# Patient Record
Sex: FEMALE | Race: WHITE | NOT HISPANIC OR LATINO | Employment: UNEMPLOYED | URBAN - NONMETROPOLITAN AREA
[De-identification: names, ages, dates, MRNs, and addresses within clinical notes are randomized per-mention and may not be internally consistent; named-entity substitution may affect disease eponyms.]

---

## 2017-03-27 ENCOUNTER — APPOINTMENT (OUTPATIENT)
Dept: GENERAL RADIOLOGY | Facility: HOSPITAL | Age: 47
End: 2017-03-27

## 2017-03-27 ENCOUNTER — HOSPITAL ENCOUNTER (EMERGENCY)
Facility: HOSPITAL | Age: 47
Discharge: HOME | End: 2017-03-28
Attending: FAMILY MEDICINE | Admitting: FAMILY MEDICINE

## 2017-03-27 ENCOUNTER — APPOINTMENT (OUTPATIENT)
Dept: CT IMAGING | Facility: HOSPITAL | Age: 47
End: 2017-03-27
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
## 2017-03-27 DIAGNOSIS — S76.011A STRAIN OF HIP, RIGHT, INITIAL ENCOUNTER: ICD-10-CM

## 2017-03-27 LAB
ALBUMIN SERPL-MCNC: 4.2 G/DL (ref 3.4–4.8)
ALBUMIN/GLOB SERPL: 1.3 G/DL (ref 1.1–1.8)
ALP SERPL-CCNC: 107 U/L (ref 38–126)
ALT SERPL W P-5'-P-CCNC: 57 U/L (ref 9–52)
AMPHET+METHAMPHET UR QL: POSITIVE
ANION GAP SERPL CALCULATED.3IONS-SCNC: 15 MMOL/L (ref 5–15)
AST SERPL-CCNC: 52 U/L (ref 14–36)
B-HCG UR QL: NEGATIVE
BARBITURATES UR QL SCN: NEGATIVE
BASOPHILS # BLD AUTO: 0.07 10*3/MM3 (ref 0–0.2)
BASOPHILS NFR BLD AUTO: 0.6 % (ref 0–2)
BENZODIAZ UR QL SCN: NEGATIVE
BILIRUB SERPL-MCNC: 1.1 MG/DL (ref 0.2–1.3)
BUN BLD-MCNC: 15 MG/DL (ref 7–21)
BUN/CREAT SERPL: 15.8 (ref 7–25)
CALCIUM SPEC-SCNC: 9 MG/DL (ref 8.4–10.2)
CANNABINOIDS SERPL QL: NEGATIVE
CHLORIDE SERPL-SCNC: 104 MMOL/L (ref 95–110)
CK SERPL-CCNC: 212 U/L (ref 30–135)
CO2 SERPL-SCNC: 23 MMOL/L (ref 22–31)
COCAINE UR QL: NEGATIVE
CREAT BLD-MCNC: 0.95 MG/DL (ref 0.5–1)
DEPRECATED RDW RBC AUTO: 43.4 FL (ref 36.4–46.3)
EOSINOPHIL # BLD AUTO: 0.36 10*3/MM3 (ref 0–0.7)
EOSINOPHIL NFR BLD AUTO: 3 % (ref 0–7)
ERYTHROCYTE [DISTWIDTH] IN BLOOD BY AUTOMATED COUNT: 13.3 % (ref 11.5–14.5)
GFR SERPL CREATININE-BSD FRML MDRD: 64 ML/MIN/1.73 (ref 60–135)
GLOBULIN UR ELPH-MCNC: 3.2 GM/DL (ref 2.3–3.5)
GLUCOSE BLD-MCNC: 95 MG/DL (ref 60–100)
HCT VFR BLD AUTO: 40.7 % (ref 35–45)
HGB BLD-MCNC: 13.9 G/DL (ref 12–15.5)
IMM GRANULOCYTES # BLD: 0.02 10*3/MM3 (ref 0–0.02)
IMM GRANULOCYTES NFR BLD: 0.2 % (ref 0–0.5)
LYMPHOCYTES # BLD AUTO: 3.88 10*3/MM3 (ref 0.6–4.2)
LYMPHOCYTES NFR BLD AUTO: 32.9 % (ref 10–50)
MCH RBC QN AUTO: 30.7 PG (ref 26.5–34)
MCHC RBC AUTO-ENTMCNC: 34.2 G/DL (ref 31.4–36)
MCV RBC AUTO: 89.8 FL (ref 80–98)
METHADONE UR QL SCN: NEGATIVE
MONOCYTES # BLD AUTO: 0.8 10*3/MM3 (ref 0–0.9)
MONOCYTES NFR BLD AUTO: 6.8 % (ref 0–12)
NEUTROPHILS # BLD AUTO: 6.68 10*3/MM3 (ref 2–8.6)
NEUTROPHILS NFR BLD AUTO: 56.5 % (ref 37–80)
OPIATES UR QL: NEGATIVE
OXYCODONE UR QL SCN: NEGATIVE
PLATELET # BLD AUTO: 243 10*3/MM3 (ref 150–450)
PMV BLD AUTO: 10.2 FL (ref 8–12)
POTASSIUM BLD-SCNC: 3.4 MMOL/L (ref 3.5–5.1)
PROT SERPL-MCNC: 7.4 G/DL (ref 6.3–8.6)
RBC # BLD AUTO: 4.53 10*6/MM3 (ref 3.77–5.16)
SODIUM BLD-SCNC: 142 MMOL/L (ref 137–145)
WBC NRBC COR # BLD: 11.81 10*3/MM3 (ref 3.2–9.8)

## 2017-03-27 PROCEDURE — 80307 DRUG TEST PRSMV CHEM ANLYZR: CPT | Performed by: FAMILY MEDICINE

## 2017-03-27 PROCEDURE — 96361 HYDRATE IV INFUSION ADD-ON: CPT

## 2017-03-27 PROCEDURE — 85025 COMPLETE CBC W/AUTO DIFF WBC: CPT | Performed by: FAMILY MEDICINE

## 2017-03-27 PROCEDURE — 73502 X-RAY EXAM HIP UNI 2-3 VIEWS: CPT

## 2017-03-27 PROCEDURE — 96374 THER/PROPH/DIAG INJ IV PUSH: CPT

## 2017-03-27 PROCEDURE — 99284 EMERGENCY DEPT VISIT MOD MDM: CPT

## 2017-03-27 PROCEDURE — 81025 URINE PREGNANCY TEST: CPT | Performed by: FAMILY MEDICINE

## 2017-03-27 PROCEDURE — 70450 CT HEAD/BRAIN W/O DYE: CPT

## 2017-03-27 PROCEDURE — 74022 RADEX COMPL AQT ABD SERIES: CPT

## 2017-03-27 PROCEDURE — 80053 COMPREHEN METABOLIC PANEL: CPT | Performed by: FAMILY MEDICINE

## 2017-03-27 PROCEDURE — 25010000002 DIPHENHYDRAMINE PER 50 MG: Performed by: FAMILY MEDICINE

## 2017-03-27 PROCEDURE — 82550 ASSAY OF CK (CPK): CPT | Performed by: FAMILY MEDICINE

## 2017-03-27 PROCEDURE — 72040 X-RAY EXAM NECK SPINE 2-3 VW: CPT

## 2017-03-27 RX ORDER — DIPHENHYDRAMINE HYDROCHLORIDE 50 MG/ML
25 INJECTION INTRAMUSCULAR; INTRAVENOUS ONCE
Status: COMPLETED | OUTPATIENT
Start: 2017-03-27 | End: 2017-03-27

## 2017-03-27 RX ADMIN — DIPHENHYDRAMINE HYDROCHLORIDE 25 MG: 50 INJECTION INTRAMUSCULAR; INTRAVENOUS at 22:25

## 2017-03-27 RX ADMIN — SODIUM CHLORIDE 1000 ML: 9 INJECTION, SOLUTION INTRAVENOUS at 22:24

## 2017-03-28 VITALS
HEIGHT: 62 IN | TEMPERATURE: 97.9 F | DIASTOLIC BLOOD PRESSURE: 72 MMHG | HEART RATE: 84 BPM | SYSTOLIC BLOOD PRESSURE: 116 MMHG | RESPIRATION RATE: 20 BRPM | WEIGHT: 210 LBS | OXYGEN SATURATION: 97 % | BODY MASS INDEX: 38.64 KG/M2
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
## 2017-04-09 ENCOUNTER — APPOINTMENT (OUTPATIENT)
Dept: GENERAL RADIOLOGY | Facility: HOSPITAL | Age: 47
End: 2017-04-09

## 2017-04-09 ENCOUNTER — APPOINTMENT (OUTPATIENT)
Dept: CT IMAGING | Facility: HOSPITAL | Age: 47
End: 2017-04-09

## 2017-04-09 ENCOUNTER — HOSPITAL ENCOUNTER (INPATIENT)
Facility: HOSPITAL | Age: 47
LOS: 3 days | End: 2017-04-12
Attending: FAMILY MEDICINE | Admitting: FAMILY MEDICINE

## 2017-04-09 DIAGNOSIS — R40.0 SOMNOLENCE: ICD-10-CM

## 2017-04-09 DIAGNOSIS — T50.902A OVERDOSE, INTENTIONAL SELF-HARM, INITIAL ENCOUNTER (HCC): Primary | ICD-10-CM

## 2017-04-09 DIAGNOSIS — T14.91XA SUICIDE ATTEMPT (HCC): ICD-10-CM

## 2017-04-09 PROBLEM — E66.09 OBESITY DUE TO EXCESS CALORIES: Status: ACTIVE | Noted: 2017-04-09

## 2017-04-09 PROBLEM — J98.11 ATELECTASIS OF LEFT LUNG: Status: ACTIVE | Noted: 2017-04-09

## 2017-04-09 PROBLEM — B18.2 CHRONIC HEPATITIS C VIRUS INFECTION (HCC): Status: ACTIVE | Noted: 2017-04-09

## 2017-04-09 PROBLEM — T50.901A OVERDOSE: Status: ACTIVE | Noted: 2017-04-09

## 2017-04-09 LAB
ALBUMIN SERPL-MCNC: 3.9 G/DL (ref 3.4–4.8)
ALBUMIN/GLOB SERPL: 1.3 G/DL (ref 1.1–1.8)
ALP SERPL-CCNC: 101 U/L (ref 38–126)
ALT SERPL W P-5'-P-CCNC: 56 U/L (ref 9–52)
AMPHET+METHAMPHET UR QL: POSITIVE
ANION GAP SERPL CALCULATED.3IONS-SCNC: 12 MMOL/L (ref 5–15)
APAP SERPL-MCNC: <10 MCG/ML (ref 10–30)
ARTERIAL PATENCY WRIST A: ABNORMAL
AST SERPL-CCNC: 53 U/L (ref 14–36)
ATMOSPHERIC PRESS: ABNORMAL MMHG
BARBITURATES UR QL SCN: NEGATIVE
BASE EXCESS BLDA CALC-SCNC: -4.2 MMOL/L (ref -2.4–2.4)
BASOPHILS # BLD AUTO: 0.04 10*3/MM3 (ref 0–0.2)
BASOPHILS NFR BLD AUTO: 0.4 % (ref 0–2)
BDY SITE: ABNORMAL
BENZODIAZ UR QL SCN: NEGATIVE
BILIRUB SERPL-MCNC: 1.2 MG/DL (ref 0.2–1.3)
BUN BLD-MCNC: 14 MG/DL (ref 7–21)
BUN/CREAT SERPL: 18.4 (ref 7–25)
CA-I BLD-MCNC: 4.9 MG/DL (ref 4.5–4.9)
CALCIUM SPEC-SCNC: 9.2 MG/DL (ref 8.4–10.2)
CANNABINOIDS SERPL QL: NEGATIVE
CHLORIDE SERPL-SCNC: 106 MMOL/L (ref 95–110)
CO2 BLDA-SCNC: 23.9 MMOL/L (ref 23–27)
CO2 SERPL-SCNC: 21 MMOL/L (ref 22–31)
COCAINE UR QL: NEGATIVE
CREAT BLD-MCNC: 0.76 MG/DL (ref 0.5–1)
D-LACTATE SERPL-SCNC: 0.5 MMOL/L (ref 0.5–2)
DEPRECATED RDW RBC AUTO: 45.3 FL (ref 36.4–46.3)
EOSINOPHIL # BLD AUTO: 0.2 10*3/MM3 (ref 0–0.7)
EOSINOPHIL NFR BLD AUTO: 2.1 % (ref 0–7)
ERYTHROCYTE [DISTWIDTH] IN BLOOD BY AUTOMATED COUNT: 13.7 % (ref 11.5–14.5)
ETHANOL BLD-MCNC: <10 MG/DL (ref 0–10)
ETHANOL UR QL: <0.01 %
GFR SERPL CREATININE-BSD FRML MDRD: 83 ML/MIN/1.73 (ref 58–135)
GLOBULIN UR ELPH-MCNC: 3.1 GM/DL (ref 2.3–3.5)
GLUCOSE BLD-MCNC: 95 MG/DL (ref 60–100)
GLUCOSE BLDA-MCNC: 99 MMOL/L
GLUCOSE BLDC GLUCOMTR-MCNC: 106 MG/DL (ref 70–130)
GLUCOSE BLDC GLUCOMTR-MCNC: 106 MG/DL (ref 70–130)
HCG SERPL QL: NEGATIVE
HCO3 BLDA-SCNC: 22.5 MMOL/L (ref 22–26)
HCT VFR BLD AUTO: 38.9 % (ref 35–45)
HCT VFR BLD CALC: 39 % (ref 38–47)
HGB BLD-MCNC: 13.2 G/DL (ref 12–15.5)
HGB BLDA-MCNC: 13.4 G/DL (ref 12–16)
HOLD SPECIMEN: NORMAL
HOLD SPECIMEN: NORMAL
IMM GRANULOCYTES # BLD: 0.02 10*3/MM3 (ref 0–0.02)
IMM GRANULOCYTES NFR BLD: 0.2 % (ref 0–0.5)
INR PPP: 1.04 (ref 0.8–1.2)
LYMPHOCYTES # BLD AUTO: 2.03 10*3/MM3 (ref 0.6–4.2)
LYMPHOCYTES NFR BLD AUTO: 21.1 % (ref 10–50)
MCH RBC QN AUTO: 30.7 PG (ref 26.5–34)
MCHC RBC AUTO-ENTMCNC: 33.9 G/DL (ref 31.4–36)
MCV RBC AUTO: 90.5 FL (ref 80–98)
METHADONE UR QL SCN: NEGATIVE
MODALITY: ABNORMAL
MONOCYTES # BLD AUTO: 0.8 10*3/MM3 (ref 0–0.9)
MONOCYTES NFR BLD AUTO: 8.3 % (ref 0–12)
NEUTROPHILS # BLD AUTO: 6.52 10*3/MM3 (ref 2–8.6)
NEUTROPHILS NFR BLD AUTO: 67.9 % (ref 37–80)
OPIATES UR QL: NEGATIVE
OXYCODONE UR QL SCN: NEGATIVE
PCO2 BLDA: 47.6 MM HG (ref 35–45)
PH BLDA: 7.29 PH UNITS (ref 7.35–7.45)
PLATELET # BLD AUTO: 231 10*3/MM3 (ref 150–450)
PMV BLD AUTO: 9.9 FL (ref 8–12)
PO2 BLDA: 148.4 MM HG (ref 80–105)
POTASSIUM BLD-SCNC: 3.6 MMOL/L (ref 3.5–5.1)
POTASSIUM BLDA-SCNC: 3.55 MMOL/L (ref 3.6–4.9)
PROT SERPL-MCNC: 7 G/DL (ref 6.3–8.6)
PROTHROMBIN TIME: 13.5 SECONDS (ref 11.1–15.3)
RBC # BLD AUTO: 4.3 10*6/MM3 (ref 3.77–5.16)
SALICYLATES SERPL-MCNC: <1 MG/DL (ref 10–20)
SAO2 % BLDCOA: 98.9 %
SODIUM BLD-SCNC: 139 MMOL/L (ref 137–145)
SODIUM BLDA-SCNC: 144.6 MMOL/L (ref 138–146)
TROPONIN I SERPL-MCNC: <0.012 NG/ML
TSH SERPL DL<=0.05 MIU/L-ACNC: 1.15 MIU/ML (ref 0.46–4.68)
WBC NRBC COR # BLD: 9.61 10*3/MM3 (ref 3.2–9.8)
WHOLE BLOOD HOLD SPECIMEN: NORMAL
WHOLE BLOOD HOLD SPECIMEN: NORMAL

## 2017-04-09 PROCEDURE — 84484 ASSAY OF TROPONIN QUANT: CPT | Performed by: FAMILY MEDICINE

## 2017-04-09 PROCEDURE — 80307 DRUG TEST PRSMV CHEM ANLYZR: CPT | Performed by: FAMILY MEDICINE

## 2017-04-09 PROCEDURE — 94799 UNLISTED PULMONARY SVC/PX: CPT

## 2017-04-09 PROCEDURE — 83605 ASSAY OF LACTIC ACID: CPT | Performed by: FAMILY MEDICINE

## 2017-04-09 PROCEDURE — 99291 CRITICAL CARE FIRST HOUR: CPT

## 2017-04-09 PROCEDURE — 71010 HC CHEST PA OR AP: CPT

## 2017-04-09 PROCEDURE — 0BH17EZ INSERTION OF ENDOTRACHEAL AIRWAY INTO TRACHEA, VIA NATURAL OR ARTIFICIAL OPENING: ICD-10-PCS | Performed by: FAMILY MEDICINE

## 2017-04-09 PROCEDURE — 80050 GENERAL HEALTH PANEL: CPT | Performed by: FAMILY MEDICINE

## 2017-04-09 PROCEDURE — 87205 SMEAR GRAM STAIN: CPT | Performed by: FAMILY MEDICINE

## 2017-04-09 PROCEDURE — 31500 INSERT EMERGENCY AIRWAY: CPT

## 2017-04-09 PROCEDURE — 94002 VENT MGMT INPAT INIT DAY: CPT

## 2017-04-09 PROCEDURE — 99221 1ST HOSP IP/OBS SF/LOW 40: CPT | Performed by: FAMILY MEDICINE

## 2017-04-09 PROCEDURE — 93010 ELECTROCARDIOGRAM REPORT: CPT | Performed by: INTERNAL MEDICINE

## 2017-04-09 PROCEDURE — 82962 GLUCOSE BLOOD TEST: CPT

## 2017-04-09 PROCEDURE — 31500 INSERT EMERGENCY AIRWAY: CPT | Performed by: FAMILY MEDICINE

## 2017-04-09 PROCEDURE — 25010000002 SUCCINYLCHOLINE PER 20 MG: Performed by: FAMILY MEDICINE

## 2017-04-09 PROCEDURE — 5A1935Z RESPIRATORY VENTILATION, LESS THAN 24 CONSECUTIVE HOURS: ICD-10-PCS | Performed by: FAMILY MEDICINE

## 2017-04-09 PROCEDURE — 87040 BLOOD CULTURE FOR BACTERIA: CPT | Performed by: FAMILY MEDICINE

## 2017-04-09 PROCEDURE — 85610 PROTHROMBIN TIME: CPT | Performed by: FAMILY MEDICINE

## 2017-04-09 PROCEDURE — 93005 ELECTROCARDIOGRAM TRACING: CPT | Performed by: FAMILY MEDICINE

## 2017-04-09 PROCEDURE — 71250 CT THORAX DX C-: CPT

## 2017-04-09 PROCEDURE — 82803 BLOOD GASES ANY COMBINATION: CPT | Performed by: FAMILY MEDICINE

## 2017-04-09 PROCEDURE — 84703 CHORIONIC GONADOTROPIN ASSAY: CPT | Performed by: FAMILY MEDICINE

## 2017-04-09 PROCEDURE — 25010000002 PROPOFOL 1000 MG/ML EMULSION: Performed by: FAMILY MEDICINE

## 2017-04-09 PROCEDURE — 87070 CULTURE OTHR SPECIMN AEROBIC: CPT | Performed by: FAMILY MEDICINE

## 2017-04-09 RX ORDER — SODIUM CHLORIDE 0.9 % (FLUSH) 0.9 %
10 SYRINGE (ML) INJECTION AS NEEDED
Status: DISCONTINUED | OUTPATIENT
Start: 2017-04-09 | End: 2017-04-12 | Stop reason: HOSPADM

## 2017-04-09 RX ORDER — SODIUM CHLORIDE 0.9 % (FLUSH) 0.9 %
1-10 SYRINGE (ML) INJECTION AS NEEDED
Status: DISCONTINUED | OUTPATIENT
Start: 2017-04-09 | End: 2017-04-12 | Stop reason: HOSPADM

## 2017-04-09 RX ORDER — SUCCINYLCHOLINE CHLORIDE 20 MG/ML
INJECTION INTRAMUSCULAR; INTRAVENOUS
Status: COMPLETED | OUTPATIENT
Start: 2017-04-09 | End: 2017-04-09

## 2017-04-09 RX ORDER — CLINDAMYCIN PHOSPHATE 150 MG/ML
600 INJECTION, SOLUTION INTRAVENOUS EVERY 8 HOURS SCHEDULED
Status: DISCONTINUED | OUTPATIENT
Start: 2017-04-09 | End: 2017-04-11

## 2017-04-09 RX ORDER — AMMONIA INHALANTS 0.04 G/.3ML
1 INHALANT RESPIRATORY (INHALATION) ONCE
Status: COMPLETED | OUTPATIENT
Start: 2017-04-09 | End: 2017-04-09

## 2017-04-09 RX ORDER — ETOMIDATE 2 MG/ML
INJECTION INTRAVENOUS
Status: COMPLETED | OUTPATIENT
Start: 2017-04-09 | End: 2017-04-09

## 2017-04-09 RX ORDER — ALBUTEROL SULFATE 2.5 MG/3ML
2.5 SOLUTION RESPIRATORY (INHALATION)
Status: DISCONTINUED | OUTPATIENT
Start: 2017-04-09 | End: 2017-04-11

## 2017-04-09 RX ORDER — DEXTROSE, SODIUM CHLORIDE, AND POTASSIUM CHLORIDE 5; .45; .15 G/100ML; G/100ML; G/100ML
100 INJECTION INTRAVENOUS CONTINUOUS
Status: DISCONTINUED | OUTPATIENT
Start: 2017-04-09 | End: 2017-04-11

## 2017-04-09 RX ADMIN — CLINDAMYCIN PHOSPHATE 600 MG: 150 INJECTION, SOLUTION INTRAMUSCULAR; INTRAVENOUS at 22:52

## 2017-04-09 RX ADMIN — AMMONIA INHALANTS 1 EACH: 0.04 INHALANT RESPIRATORY (INHALATION) at 16:11

## 2017-04-09 RX ADMIN — POTASSIUM CHLORIDE, DEXTROSE MONOHYDRATE AND SODIUM CHLORIDE 100 ML/HR: 150; 5; 450 INJECTION, SOLUTION INTRAVENOUS at 22:32

## 2017-04-09 RX ADMIN — PROPOFOL 30 MCG/KG/MIN: 10 INJECTION, EMULSION INTRAVENOUS at 21:00

## 2017-04-09 RX ADMIN — SUCCINYLCHOLINE CHLORIDE 100 MG: 20 INJECTION, SOLUTION INTRAMUSCULAR; INTRAVENOUS at 16:26

## 2017-04-09 RX ADMIN — ETOMIDATE 20 MG: 2 INJECTION, SOLUTION INTRAVENOUS at 16:26

## 2017-04-09 RX ADMIN — PROPOFOL 40 MCG/KG/MIN: 10 INJECTION, EMULSION INTRAVENOUS at 16:37

## 2017-04-09 NOTE — ED NOTES
resp suctioned pt orally with blood return, Notified MD, bleeding post pharynx per MD. No interventions needed.      Sandra Vera, RN  04/09/17 1950

## 2017-04-09 NOTE — H&P
HISTORY AND PHYSICAL  NAME: Rozina Mccartney  : 1970  MRN: [id number]    DATE OF ADMISSION: 17    DATE & TIME SEEN: 17 5:56 PM    PCP: No Known Provider    CODE STATUS: Full code    CHIEF COMPLAINT Drug overdose, SI    HPI:  Rozina Mccartney is a 46 y.o. female who presents via EMS with drug overdose.  Per EMS patient called and advised that she was going to take all of her medication and maybe then she would die.  Patient has not been able to provide any history while in the ED. She became progressively less responsive and was then intubated. There is no family present to obtain further history.  Per records, patient has had OD previously.      CONCURRENT MEDICAL HISTORY: PATIENT UNABLE TO GIVE HISTORY/PER CHART:  Past Medical History:   Diagnosis Date   • Anxiety state    • Bronchitis    • Chest pain    • Depressive disorder    • Essential hypertension    • Excessive or frequent menstruation    • Hyperlipidemia    • Intermenstrual bleeding     irregular - suspect endometrial versus endocervical polyp   • Osteoarthritis of multiple joints    • Surgery follow-up    • Tobacco dependence syndrome    • Viral hepatitis C    • Wheezing        PAST SURGICAL HISTORY:  Past Surgical History:   Procedure Laterality Date   •  SECTION  2005    x2   • DENTAL PROCEDURE      wisdom teeth   • DILATATION AND CURETTAGE      x2   • HYSTEROSCOPY     • INCISION AND DRAINAGE ABSCESS  2013    abscess of hand, septic arthritis, metatarsophalangeal joint, right hand   • INJECTION OF MEDICATION  2016    kenalog   • NECK SURGERY  1994 &    • SUPRACERVICAL HYSTERECTOMY SALPINGO OOPHORECTOMY Bilateral 2013    exam under anesthesia and supracervical hysterectomy with bilateral salpingo-ooporectomy. menometrorrhagia with stage 4 endometriosis   • TONSILLECTOMY         FAMILY HISTORY:  History reviewed. No pertinent family history.     SOCIAL HISTORY:  Social History     Social  History   • Marital status:      Spouse name: N/A   • Number of children: N/A   • Years of education: N/A     Occupational History   • Not on file.     Social History Main Topics   • Smoking status: Current Every Day Smoker     Packs/day: 0.50     Years: 15.00   • Smokeless tobacco: Never Used   • Alcohol use No   • Drug use: No   • Sexual activity: Yes     Other Topics Concern   • Not on file     Social History Narrative       HOME MEDICATIONS:    (Not in a hospital admission)    ALLERGIES:  Penicillins; Seroquel [quetiapine fumarate]; Stadol [butorphanol]; and Wellbutrin [bupropion]    REVIEW OF SYSTEMS  Review of Systems   Unable to perform ROS: Intubated       PHYSICAL EXAM:  Temp:  [97.5 °F (36.4 °C)] 97.5 °F (36.4 °C)  Heart Rate:  [68-84] 76  Resp:  [14-16] 14  BP: ()/() 103/57  FiO2 (%):  [100 %] 100 %  Body mass index is 42.91 kg/(m^2).  Physical Exam   Constitutional: Vital signs are normal. She appears well-developed and well-nourished. No distress. She is sedated and intubated.   Patient intubated and sedated, no signs of acute trauma.    HENT:   Head: Normocephalic and atraumatic.   Right Ear: External ear normal.   Left Ear: External ear normal.   Nose: Nose normal.   Eyes: Lids are normal. Pupils are equal, round, and reactive to light. Right eye exhibits no discharge. Left eye exhibits no discharge. Right conjunctiva is not injected. Right conjunctiva has no hemorrhage. Left conjunctiva is not injected. Left conjunctiva has no hemorrhage.   Dilated pupils, reactive to light   Neck: Trachea normal. No erythema present. No thyroid mass present.   Cardiovascular: Normal rate and regular rhythm.  Exam reveals no decreased pulses.    Pulses:       Radial pulses are 2+ on the right side, and 2+ on the left side.        Dorsalis pedis pulses are 2+ on the right side, and 2+ on the left side.   Pulmonary/Chest: She is intubated. She has decreased breath sounds in the left upper field, the  "left middle field and the left lower field. She has wheezes in the right lower field. She has rhonchi in the right lower field.   Abdominal: Soft. Normal appearance and bowel sounds are normal.   Neurological: She is unresponsive.   Skin: Skin is warm, dry and intact. She is not diaphoretic.   Psychiatric: She expresses suicidal (per EMS patient stated \"Maybe this will kill me\") ideation.   Nursing note and vitals reviewed.      DIAGNOSTIC DATA:   Lab Results (last 24 hours)     Procedure Component Value Units Date/Time    [place] Draw [[id number]] Collected:  04/09/17 1603    Specimen:  Blood Updated:  04/09/17 1609    Narrative:       The following orders were created for panel order [place] Draw.  Procedure                               Abnormality         Status                     ---------                               -----------         ------                     Light Blue Top[[id number]]                                    In process                 Green Top (Gel)[[id number]]                                   In process                 Lavender Top[[id number]]                                      In process                 Gold Top - SST[[id number]]                                    In process                   Please view results for these tests on the individual orders.    Gold Top - SST [[id number]] Collected:  04/09/17 1603    Specimen:  Blood Updated:  04/09/17 1609    Green Top (Gel) [[id number]] Collected:  04/09/17 1603    Specimen:  Blood Updated:  04/09/17 1609    Lavender Top [[id number]] Collected:  04/09/17 1603    Specimen:  Blood Updated:  04/09/17 1609    Light Blue Top [[id number]] Collected:  04/09/17 1603    Specimen:  Blood Updated:  04/09/17 1609    CBC & Differential [[id number]] Collected:  04/09/17 1603    Specimen:  Blood Updated:  04/09/17 1612    Narrative:       The following orders were created for panel order CBC & Differential.  Procedure                               Abnormality         Status "                     ---------                               -----------         ------                     CBC Auto Differential[[id number]]         Normal              Final result                 Please view results for these tests on the individual orders.    CBC Auto Differential [[id number]]  (Normal) Collected:  04/09/17 1603    Specimen:  Blood Updated:  04/09/17 1612     WBC 9.61 10*3/mm3      RBC 4.30 10*6/mm3      Hemoglobin 13.2 g/dL      Hematocrit 38.9 %      MCV 90.5 fL      MCH 30.7 pg      MCHC 33.9 g/dL      RDW 13.7 %      RDW-SD 45.3 fl      MPV 9.9 fL      Platelets 231 10*3/mm3      Neutrophil % 67.9 %      Lymphocyte % 21.1 %      Monocyte % 8.3 %      Eosinophil % 2.1 %      Basophil % 0.4 %      Immature Grans % 0.2 %      Neutrophils, Absolute 6.52 10*3/mm3      Lymphocytes, Absolute 2.03 10*3/mm3      Monocytes, Absolute 0.80 10*3/mm3      Eosinophils, Absolute 0.20 10*3/mm3      Basophils, Absolute 0.04 10*3/mm3      Immature Grans, Absolute 0.02 10*3/mm3     hCG, Serum, Qualitative [[id number]]  (Normal) Collected:  04/09/17 1603    Specimen:  Blood Updated:  04/09/17 1620     HCG Qualitative Negative    Comprehensive Metabolic Panel [[id number]]  (Abnormal) Collected:  04/09/17 1603    Specimen:  Blood Updated:  04/09/17 1622     Glucose 95 mg/dL      BUN 14 mg/dL      Creatinine 0.76 mg/dL      Sodium 139 mmol/L      Potassium 3.6 mmol/L      Chloride 106 mmol/L      CO2 21.0 (L) mmol/L      Calcium 9.2 mg/dL      Total Protein 7.0 g/dL      Albumin 3.90 g/dL      ALT (SGPT) 56 (H) U/L      AST (SGOT) 53 (H) U/L      Alkaline Phosphatase 101 U/L      Total Bilirubin 1.2 mg/dL      eGFR Non African Amer 83 mL/min/1.73      Globulin 3.1 gm/dL      A/G Ratio 1.3 g/dL      BUN/Creatinine Ratio 18.4     Anion Gap 12.0 mmol/L     Salicylate Level [[id number]]  (Abnormal) Collected:  04/09/17 1603    Specimen:  Blood Updated:  04/09/17 1622     Salicylate <1.0 (L) mg/dL     Acetaminophen Level  [[id number]]  (Abnormal) Collected:  04/09/17 1603    Specimen:  Blood Updated:  04/09/17 1625     Acetaminophen <10.0 (L) mcg/mL     Protime-INR [[id number]]  (Normal) Collected:  04/09/17 1603    Specimen:  Blood Updated:  04/09/17 1635     Protime 13.5 Seconds      INR 1.04    Narrative:       Therapeutic range for most indications is 2.0-3.0 INR,  or 2.5-3.5 for mechanical heart valves.    Ethanol [[id number]] Collected:  04/09/17 1603    Specimen:  Blood Updated:  04/09/17 1640     Ethanol <10 mg/dL      Ethanol % <0.010 %     TSH [[id number]]  (Normal) Collected:  04/09/17 1603    Specimen:  Blood Updated:  04/09/17 1653     TSH 1.150 mIU/mL     POC Glucose Fingerstick [[id number]]  (Normal) Collected:  04/09/17 1656    Specimen:  Blood Updated:  04/09/17 1656     Glucose 106 mg/dL     Respiratory Culture [[id number]] Collected:  04/09/17 1638    Specimen:  Sputum from ET Suction Updated:  04/09/17 1713     Gram Stain Result Many (4+) WBCs per low power field      Rare (1+) Epithelial cells per low power field      Mixed bacterial donny    Blood Gas, Arterial [[id number]]  (Abnormal) Collected:  04/09/17 1717    Specimen:  Arterial Blood Updated:  04/09/17 1717     Site --      Not performed at this site.        Gary's Test --      Not performed at this site.        pH, Arterial 7.292 (L) pH units      pCO2, Arterial 47.6 (H) mm Hg      pO2, Arterial 148.4 (H) mm Hg      HCO3, Arterial 22.5 mmol/L      Base Excess, Arterial -4.2 (L) mmol/L      O2 Saturation, Arterial 98.9 %      Hemoglobin, Blood Gas 13.4 g/dL      Hematocrit, Blood Gas 39.0 %      CO2 Content 23.9     Sodium, Arterial 144.6 mmol/L      Potassium, Arterial 3.55 (L) mmol/L      Glucose, Arterial 99 mmol/L      Barometric Pressure for Blood Gas -- mmHg       Not performed at this site.        Modality --      Not performed at this site.        Ionized Calcium 4.9 mg/dL     Urine Drug Screen [[id number]]  (Abnormal) Collected:  04/09/17 3508    Specimen:   Urine from Urine, Clean Catch Updated:  04/09/17 1748     Amphetamine Screen, Urine Positive (A)     Barbiturates Screen, Urine Negative     Benzodiazepine Screen, Urine Negative     Cocaine Screen, Urine Negative     Methadone Screen, Urine Negative     Opiate Screen Negative     Oxycodone Screen, Urine Negative     THC, Screen, Urine Negative    Narrative:       Negative Thresholds For Drugs Screened in Urine:     Amphetamines          500 ng/ml  Barbiturates          200 ng/ml  Benzodiazepines       200 ng/ml  Cocaine               150 ng/ml  Methadone             300 ng/mL  Opiates               300 ng/mL  Oxycodone             100 ng/mL  THC                   20 ng/mL    The normal value for all drugs tested is negative. This report includes final unconfirmed screening results.  A positive result by this assay can be, at your request, sent to the Reference Lab for confirmation by gas chromatography. Unconfirmed results must not be used for non-medical purposes, such as employment or legal testing. Clinical consideration should be applied to any drug of abuse test result, particularly when unconfirmed results are used.           Imaging Results (last 24 hours)     ** No results found for the last 24 hours. **          I reviewed the patient's new clinical results.    ASSESSMENT AND PLAN: This is a 46 y.o. female with:    Active Hospital Problems (** Indicates Principal Problem)    Diagnosis Date Noted   • Overdose [T50.901A] 04/09/2017     Patient currently intubated and sedated  Will provide supportive management with IVF  Will maintain on cardiac monitor   Will obtain ABG in AM      • Suicidal overdose [T50.902A] 04/09/2017     Will obtain a psych consult when patient appropriate     • Atelectasis of left lung [J98.11] 04/09/2017     Possibly due to improper ET placement  ET repositioned in ED   Will obtain CT scan   Will treat for possible aspiration PNA     • Obesity due to excess calories [E66.09]  04/09/2017   • Chronic hepatitis C virus infection [B18.2] 04/09/2017     Will monitor LFTs         Resolved Hospital Problems    Diagnosis Date Noted Date Resolved   No resolved problems to display.         DVT prophylaxis: Lovenox  Code status is Full Code  NATALY # [id number], reviewed and consistent with patient reported medications.      I discussed the patients findings and my recommendations with nursing staff.     Dr. Guthrie is the attending on record at time of admission, he is aware of the patient's status and agrees with the above history and physical.          This document has been electronically signed by Sandra Dominguez MD on April 9, 2017 5:56 PM

## 2017-04-09 NOTE — ED PROVIDER NOTES
Subjective   Patient is a 46 y.o. female presenting with ingestion.   Ingestion   Ingested substance:  Prescription medication  Ingestion amount:  Unknown  Incident location:  Home  Context: intentional ingestion and suicide attempt    Associated symptoms: lethargy and unresponsiveness    Associated symptoms: no abdominal pain, no chest pain, no cough, no diaphoresis, no diarrhea, no headaches, no nausea, no shortness of breath and no vomiting        Review of Systems   Constitutional: Positive for activity change. Negative for chills, diaphoresis, fatigue and fever.   HENT: Negative for congestion, facial swelling, nosebleeds, rhinorrhea, sinus pressure, sore throat and trouble swallowing.    Eyes: Negative for discharge and redness.   Respiratory: Negative for apnea, cough, chest tightness, shortness of breath and wheezing.    Cardiovascular: Negative for chest pain.   Gastrointestinal: Negative for abdominal pain, diarrhea, nausea and vomiting.   Endocrine: Negative for polyuria.   Genitourinary: Negative for dysuria, frequency and urgency.   Musculoskeletal: Negative for myalgias and neck pain.   Skin: Negative for color change and rash.   Allergic/Immunologic: Negative for immunocompromised state.   Neurological: Negative for dizziness, seizures, syncope, weakness, light-headedness and headaches.   Hematological: Negative for adenopathy. Does not bruise/bleed easily.   Psychiatric/Behavioral: Negative for behavioral problems and confusion.   All other systems reviewed and are negative.      Past Medical History:   Diagnosis Date   • Anxiety state    • Bronchitis    • Chest pain    • Depressive disorder    • Essential hypertension    • Excessive or frequent menstruation    • Hyperlipidemia    • Intermenstrual bleeding     irregular - suspect endometrial versus endocervical polyp   • Osteoarthritis of multiple joints    • Surgery follow-up    • Tobacco dependence syndrome    • Viral hepatitis C    • Wheezing         Allergies   Allergen Reactions   • Penicillins    • Seroquel [Quetiapine Fumarate]    • Stadol [Butorphanol] Other (See Comments)     *delusional   • Wellbutrin [Bupropion] Other (See Comments)     *aggitation       Past Surgical History:   Procedure Laterality Date   •  SECTION  2005    x2   • DENTAL PROCEDURE      wisdom teeth   • DILATATION AND CURETTAGE      x2   • HYSTEROSCOPY     • INCISION AND DRAINAGE ABSCESS  2013    abscess of hand, septic arthritis, metatarsophalangeal joint, right hand   • INJECTION OF MEDICATION  2016    kenalog   • NECK SURGERY  1994 &    • SUPRACERVICAL HYSTERECTOMY SALPINGO OOPHORECTOMY Bilateral 2013    exam under anesthesia and supracervical hysterectomy with bilateral salpingo-ooporectomy. menometrorrhagia with stage 4 endometriosis   • TONSILLECTOMY         No family history on file.    Social History     Social History   • Marital status:      Spouse name: N/A   • Number of children: N/A   • Years of education: N/A     Social History Main Topics   • Smoking status: Current Every Day Smoker     Packs/day: 0.50     Years: 15.00   • Smokeless tobacco: Never Used   • Alcohol use No   • Drug use: No   • Sexual activity: Yes     Other Topics Concern   • Not on file     Social History Narrative           Objective   Physical Exam   Constitutional: She is oriented to person, place, and time. She appears well-developed and well-nourished.   HENT:   Head: Normocephalic and atraumatic.   Nose: Nose normal.   Mouth/Throat: Oropharynx is clear and moist.   Eyes: Conjunctivae and EOM are normal. Pupils are equal, round, and reactive to light. Right eye exhibits no discharge. Left eye exhibits no discharge. No scleral icterus.   Neck: Normal range of motion. Neck supple. No tracheal deviation present.   Cardiovascular: Normal rate, regular rhythm and normal heart sounds.    No murmur heard.  Pulmonary/Chest: Effort normal and breath  sounds normal. No stridor. No respiratory distress. She has no wheezes. She has no rales.   Abdominal: Soft. Bowel sounds are normal. She exhibits no distension and no mass. There is no tenderness. There is no rebound and no guarding.   Musculoskeletal: She exhibits no edema.   Neurological: She is alert and oriented to person, place, and time. Coordination normal.   Skin: Skin is warm and dry. No rash noted. No erythema.   Psychiatric: She has a normal mood and affect. Her behavior is normal. Thought content normal.   Nursing note and vitals reviewed.      ECG 12 Lead    Date/Time: 4/9/2017 5:04 PM  Performed by: DERRICK MAI  Authorized by: DERRICK MAI   Interpreted by physician  Comparison: compared with previous ECG   Rhythm: sinus rhythm  Rate: normal  BPM: 80  QRS axis: normal  Conduction: conduction normal  ST Segments: ST segments normal      Intubation  Date/Time: 4/9/2017 5:05 PM  Performed by: DERRICK MAI  Authorized by: DERRICK MAI   Consent: The procedure was performed in an emergent situation.  Indications: airway protection  Intubation method: direct  Patient status: paralyzed (RSI)  Preoxygenation: BVM  Sedatives: etomidate  Paralytic: succinylcholine  Laryngoscope size: Mac 5  Tube size: 7.5 mm  Tube type: cuffed  Number of attempts: 1  Cricoid pressure: yes  Cords visualized: yes  Post-procedure assessment: chest rise and ETCO2 monitor  Breath sounds: equal  Cuff inflated: yes  ETT to lip: 22 cm  Tube secured with: ETT [place]  Chest x-ray interpreted by radiologist.  Chest x-ray findings: endotracheal tube too low  Tube repositioned: tube repositioned successfully  Patient tolerance: Patient tolerated the procedure well with no immediate complications               ED Course  ED Course        Labs Reviewed   COMPREHENSIVE METABOLIC PANEL - Abnormal; Notable for the following:        Result Value    CO2 21.0 (*)     ALT (SGPT) 56 (*)     AST (SGOT) 53 (*)     All  other components within normal limits   ACETAMINOPHEN LEVEL - Abnormal; Notable for the following:     Acetaminophen <10.0 (*)     All other components within normal limits   SALICYLATE LEVEL - Abnormal; Notable for the following:     Salicylate <1.0 (*)     All other components within normal limits   URINE DRUG SCREEN - Abnormal; Notable for the following:     Amphetamine Screen, Urine Positive (*)     All other components within normal limits    Narrative:     Negative Thresholds For Drugs Screened in Urine:     Amphetamines          500 ng/ml  Barbiturates          200 ng/ml  Benzodiazepines       200 ng/ml  Cocaine               150 ng/ml  Methadone             300 ng/mL  Opiates               300 ng/mL  Oxycodone             100 ng/mL  THC                   20 ng/mL    The normal value for all drugs tested is negative. This report includes final unconfirmed screening results.  A positive result by this assay can be, at your request, sent to the Reference Lab for confirmation by gas chromatography. Unconfirmed results must not be used for non-medical purposes, such as employment or legal testing. Clinical consideration should be applied to any drug of abuse test result, particularly when unconfirmed results are used.   BLOOD GAS, ARTERIAL - Abnormal; Notable for the following:     pH, Arterial 7.292 (*)     pCO2, Arterial 47.6 (*)     pO2, Arterial 148.4 (*)     Base Excess, Arterial -4.2 (*)     Potassium, Arterial 3.55 (*)     All other components within normal limits   HCG, SERUM, QUALITATIVE - Normal   TSH - Normal   PROTIME-INR - Normal    Narrative:     Therapeutic range for most indications is 2.0-3.0 INR,  or 2.5-3.5 for mechanical heart valves.   CBC WITH AUTO DIFFERENTIAL - Normal   POCT GLUCOSE FINGERSTICK - Normal   POCT GLUCOSE FINGERSTICK - Normal   RESPIRATORY CULTURE   ETHANOL   RAINBOW DRAW    Narrative:     The following orders were created for panel order [place] Draw.  Procedure                                Abnormality         Status                     ---------                               -----------         ------                     Light Blue Top[[id number]]                                    In process                 Green Top (Gel)[[id number]]                                   In process                 Lavender Top[[id number]]                                      In process                 Gold Top - SST[[id number]]                                    In process                   Please view results for these tests on the individual orders.   CBC AND DIFFERENTIAL    Narrative:     The following orders were created for panel order CBC & Differential.  Procedure                               Abnormality         Status                     ---------                               -----------         ------                     CBC Auto Differential[[id number]]         Normal              Final result                 Please view results for these tests on the individual orders.   LIGHT BLUE TOP   GREEN TOP   LAVENDER TOP   GOLD TOP - SST       XR Chest 1 View   Final Result   CONCLUSION:   Endotracheal tube in place with tip in the right mainstem   bronchus.   Associated opacification of the left hemithorax compatible with   atelectasis of the entire left lung.   Recommend withdrawing endotracheal tube 4.5 cm or more.   Discoid atelectasis right upper lobe.   Nasogastric tube in place with tip in the fundus of the stomach   directed cephalad and to the left.      Report called at approximately 3:40 PM CST.      [id number]      Electronically signed by:  Maikel Shannon MD  4/9/2017 5:32 PM CDT   Workstation: [id number]      CT Chest Without Contrast    (Results Pending)   XR Chest 1 View    (Results Pending)                   MDM    Final diagnoses:   Overdose, intentional self-harm, initial encounter   Somnolence   Suicide attempt            Ozzy Angel MD  04/09/17 [id number]

## 2017-04-09 NOTE — ED NOTES
Pt had pills brought in by EMS including amitriptyline 50 mg, paroxitine 40 mg, paroxitine 40 mg. The previously listed med were in pt name. There were also Sertraline 100 mg tablets in a bottle prescribed to Miguel Rae and melatonin 3 mg with name scratched out on bottle. Medications sent to pharmacy.      Sandra Vera RN  04/09/17 [id number]

## 2017-04-10 ENCOUNTER — APPOINTMENT (OUTPATIENT)
Dept: INTERVENTIONAL RADIOLOGY/VASCULAR | Facility: HOSPITAL | Age: 47
End: 2017-04-10

## 2017-04-10 ENCOUNTER — APPOINTMENT (OUTPATIENT)
Dept: ULTRASOUND IMAGING | Facility: HOSPITAL | Age: 47
End: 2017-04-10

## 2017-04-10 ENCOUNTER — APPOINTMENT (OUTPATIENT)
Dept: GENERAL RADIOLOGY | Facility: HOSPITAL | Age: 47
End: 2017-04-10

## 2017-04-10 LAB
ALBUMIN SERPL-MCNC: 3.9 G/DL (ref 3.4–4.8)
ALBUMIN/GLOB SERPL: 1.2 G/DL (ref 1.1–1.8)
ALP SERPL-CCNC: 97 U/L (ref 38–126)
ALT SERPL W P-5'-P-CCNC: 61 U/L (ref 9–52)
ANION GAP SERPL CALCULATED.3IONS-SCNC: 14 MMOL/L (ref 5–15)
ARTERIAL PATENCY WRIST A: ABNORMAL
AST SERPL-CCNC: 62 U/L (ref 14–36)
ATMOSPHERIC PRESS: ABNORMAL MMHG
BASE EXCESS BLDA CALC-SCNC: 1.8 MMOL/L (ref -2.4–2.4)
BASOPHILS # BLD AUTO: 0.05 10*3/MM3 (ref 0–0.2)
BASOPHILS NFR BLD AUTO: 0.5 % (ref 0–2)
BDY SITE: ABNORMAL
BILIRUB SERPL-MCNC: 0.9 MG/DL (ref 0.2–1.3)
BUN BLD-MCNC: 11 MG/DL (ref 7–21)
BUN/CREAT SERPL: 13.8 (ref 7–25)
CA-I BLD-MCNC: 4.7 MG/DL (ref 4.5–4.9)
CALCIUM SPEC-SCNC: 8.7 MG/DL (ref 8.4–10.2)
CHLORIDE SERPL-SCNC: 104 MMOL/L (ref 95–110)
CO2 BLDA-SCNC: 26.3 MMOL/L (ref 23–27)
CO2 SERPL-SCNC: 27 MMOL/L (ref 22–31)
CREAT BLD-MCNC: 0.8 MG/DL (ref 0.5–1)
DEPRECATED RDW RBC AUTO: 47.6 FL (ref 36.4–46.3)
EOSINOPHIL # BLD AUTO: 0.22 10*3/MM3 (ref 0–0.7)
EOSINOPHIL NFR BLD AUTO: 2 % (ref 0–7)
ERYTHROCYTE [DISTWIDTH] IN BLOOD BY AUTOMATED COUNT: 14 % (ref 11.5–14.5)
GFR SERPL CREATININE-BSD FRML MDRD: 78 ML/MIN/1.73 (ref 60–135)
GLOBULIN UR ELPH-MCNC: 3.2 GM/DL (ref 2.3–3.5)
GLUCOSE BLD-MCNC: 97 MG/DL (ref 60–100)
GLUCOSE BLDA-MCNC: 108 MMOL/L
HCO3 BLDA-SCNC: 25.2 MMOL/L (ref 22–26)
HCT VFR BLD AUTO: 41.8 % (ref 35–45)
HCT VFR BLD CALC: 42 % (ref 38–47)
HGB BLD-MCNC: 14 G/DL (ref 12–15.5)
HGB BLDA-MCNC: 14.4 G/DL (ref 12–16)
IMM GRANULOCYTES # BLD: 0.03 10*3/MM3 (ref 0–0.02)
IMM GRANULOCYTES NFR BLD: 0.3 % (ref 0–0.5)
LYMPHOCYTES # BLD AUTO: 1.57 10*3/MM3 (ref 0.6–4.2)
LYMPHOCYTES NFR BLD AUTO: 14.3 % (ref 10–50)
MCH RBC QN AUTO: 30.8 PG (ref 26.5–34)
MCHC RBC AUTO-ENTMCNC: 33.5 G/DL (ref 31.4–36)
MCV RBC AUTO: 92.1 FL (ref 80–98)
MODALITY: ABNORMAL
MONOCYTES # BLD AUTO: 0.77 10*3/MM3 (ref 0–0.9)
MONOCYTES NFR BLD AUTO: 7 % (ref 0–12)
NEUTROPHILS # BLD AUTO: 8.36 10*3/MM3 (ref 2–8.6)
NEUTROPHILS NFR BLD AUTO: 75.9 % (ref 37–80)
PCO2 BLDA: 35.8 MM HG (ref 35–45)
PH BLDA: 7.47 PH UNITS (ref 7.35–7.45)
PLATELET # BLD AUTO: 189 10*3/MM3 (ref 150–450)
PMV BLD AUTO: 9.8 FL (ref 8–12)
PO2 BLDA: 71.8 MM HG (ref 80–105)
POTASSIUM BLD-SCNC: 3.6 MMOL/L (ref 3.5–5.1)
POTASSIUM BLDA-SCNC: 3.47 MMOL/L (ref 3.6–4.9)
PROT SERPL-MCNC: 7.1 G/DL (ref 6.3–8.6)
RBC # BLD AUTO: 4.54 10*6/MM3 (ref 3.77–5.16)
SAO2 % BLDCOA: 95.7 %
SODIUM BLD-SCNC: 145 MMOL/L (ref 137–145)
SODIUM BLDA-SCNC: 142.3 MMOL/L (ref 138–146)
TROPONIN I SERPL-MCNC: <0.012 NG/ML
TROPONIN I SERPL-MCNC: <0.012 NG/ML
WBC NRBC COR # BLD: 11 10*3/MM3 (ref 3.2–9.8)

## 2017-04-10 PROCEDURE — 94799 UNLISTED PULMONARY SVC/PX: CPT

## 2017-04-10 PROCEDURE — B548ZZA ULTRASONOGRAPHY OF SUPERIOR VENA CAVA, GUIDANCE: ICD-10-PCS | Performed by: RADIOLOGY

## 2017-04-10 PROCEDURE — 99232 SBSQ HOSP IP/OBS MODERATE 35: CPT | Performed by: FAMILY MEDICINE

## 2017-04-10 PROCEDURE — 25010000002 PROPOFOL 1000 MG/ML EMULSION: Performed by: FAMILY MEDICINE

## 2017-04-10 PROCEDURE — 71010 HC CHEST PA OR AP: CPT

## 2017-04-10 PROCEDURE — 76937 US GUIDE VASCULAR ACCESS: CPT

## 2017-04-10 PROCEDURE — 99291 CRITICAL CARE FIRST HOUR: CPT | Performed by: INTERNAL MEDICINE

## 2017-04-10 PROCEDURE — 80053 COMPREHEN METABOLIC PANEL: CPT | Performed by: FAMILY MEDICINE

## 2017-04-10 PROCEDURE — 82803 BLOOD GASES ANY COMBINATION: CPT | Performed by: FAMILY MEDICINE

## 2017-04-10 PROCEDURE — 94003 VENT MGMT INPAT SUBQ DAY: CPT

## 2017-04-10 PROCEDURE — 84484 ASSAY OF TROPONIN QUANT: CPT | Performed by: FAMILY MEDICINE

## 2017-04-10 PROCEDURE — 36600 WITHDRAWAL OF ARTERIAL BLOOD: CPT

## 2017-04-10 PROCEDURE — 85025 COMPLETE CBC W/AUTO DIFF WBC: CPT | Performed by: FAMILY MEDICINE

## 2017-04-10 PROCEDURE — C1751 CATH, INF, PER/CENT/MIDLINE: HCPCS

## 2017-04-10 PROCEDURE — 02HV33Z INSERTION OF INFUSION DEVICE INTO SUPERIOR VENA CAVA, PERCUTANEOUS APPROACH: ICD-10-PCS | Performed by: RADIOLOGY

## 2017-04-10 PROCEDURE — 25010000002 ENOXAPARIN PER 10 MG: Performed by: FAMILY MEDICINE

## 2017-04-10 RX ADMIN — POTASSIUM CHLORIDE, DEXTROSE MONOHYDRATE AND SODIUM CHLORIDE 100 ML/HR: 150; 5; 450 INJECTION, SOLUTION INTRAVENOUS at 08:48

## 2017-04-10 RX ADMIN — Medication 10 ML: at 08:48

## 2017-04-10 RX ADMIN — ENOXAPARIN SODIUM 40 MG: 40 INJECTION SUBCUTANEOUS at 08:48

## 2017-04-10 RX ADMIN — CLINDAMYCIN PHOSPHATE 600 MG: 150 INJECTION, SOLUTION INTRAMUSCULAR; INTRAVENOUS at 06:31

## 2017-04-10 RX ADMIN — PROPOFOL 30 MCG/KG/MIN: 10 INJECTION, EMULSION INTRAVENOUS at 04:14

## 2017-04-10 RX ADMIN — PROPOFOL 45 MCG/KG/MIN: 10 INJECTION, EMULSION INTRAVENOUS at 08:47

## 2017-04-10 RX ADMIN — POTASSIUM CHLORIDE, DEXTROSE MONOHYDRATE AND SODIUM CHLORIDE 100 ML/HR: 150; 5; 450 INJECTION, SOLUTION INTRAVENOUS at 19:01

## 2017-04-10 RX ADMIN — PROPOFOL 30 MCG/KG/MIN: 10 INJECTION, EMULSION INTRAVENOUS at 01:00

## 2017-04-10 RX ADMIN — PROPOFOL 45 MCG/KG/MIN: 10 INJECTION, EMULSION INTRAVENOUS at 11:26

## 2017-04-10 RX ADMIN — CLINDAMYCIN PHOSPHATE 600 MG: 150 INJECTION, SOLUTION INTRAMUSCULAR; INTRAVENOUS at 15:38

## 2017-04-10 RX ADMIN — ALBUTEROL SULFATE 2.5 MG: 2.5 SOLUTION RESPIRATORY (INHALATION) at 17:30

## 2017-04-10 NOTE — PAYOR COMM NOTE
"Isael Felder (46 y.o. Female)     Date of Birth Social Security Number Address Home Phone MRN    1970  [address]  [facility] [address] [phone] [id number]    Congregation Marital Status          Roman Catholic        Admission Date Admission Type Admitting Provider Attending Provider Department, Room/Bed    4/9/17 Emergency Leo Guthrie DO [place], Ashley Lynn, MD [facility] CRITICAL CARE, 10/A    Discharge Date Discharge Disposition Discharge Destination                      Attending Provider: Sandra Dominguez MD     Allergies:  Penicillins, Seroquel [Quetiapine Fumarate], Stadol [Butorphanol], Wellbutrin [Bupropion]    Isolation:  None   Infection:  None   Code Status:  FULL    Ht:  64\" (162.6 cm)   Wt:  210 lb 1.6 oz (95.3 kg)    Admission Cmt:  None   Principal Problem:  None                Active Insurance as of 4/9/2017     Primary Coverage     Payor Plan Insurance Group Employer/Plan Group    [employer] [place] [employer]LL[place]ICA[place]      Payor Plan Address Payor Plan Phone Number Effective From Effective To    [address] [address] [phone] 3/27/2017     [place] [address]       Subscriber Name Subscriber Birth Date Member ID       ISAEL FELDER 1970 [id number]                 Emergency Contacts      (Rel.) Home Phone Work Phone Mobile Phone    Kristina Santo (Relative) [phone] -- --            Insurance Information                [facility]/WELLCARE MEDICAID Phone: [phone]    Subscriber: Isael Felder Subscriber#: [id number]    Group#:  Precert#:              History & Physical      Sandra Dominguez MD at 4/9/2017  4:53 PM     Attestation signed by Leo Guthrie DO at 4/9/2017  7:39 PM        I have examined the patient and reviewed the pertinent diagnostic data. I have discussed the case with the Family Medicine Resident and agree with the assessment and plan of care.    Leo Guthrie DO           This " document has been electronically signed by Leo Guthrie DO on 2017 7:39 PM                                 HISTORY AND PHYSICAL  NAME: Rozina Mccartney  : 1970  MRN: [id number]    DATE OF ADMISSION: 17    DATE & TIME SEEN: 17 5:56 PM    PCP: No Known Provider    CODE STATUS: Full code    CHIEF COMPLAINT Drug overdose, SI    HPI:  Rozina Mccartney is a 46 y.o. female who presents via EMS with drug overdose.  Per EMS patient called and advised that she was going to take all of her medication and maybe then she would die.  Patient has not been able to provide any history while in the ED. She became progressively less responsive and was then intubated. There is no family present to obtain further history.  Per records, patient has had OD previously.      CONCURRENT MEDICAL HISTORY: PATIENT UNABLE TO GIVE HISTORY/PER CHART:  Past Medical History:   Diagnosis Date   • Anxiety state    • Bronchitis    • Chest pain    • Depressive disorder    • Essential hypertension    • Excessive or frequent menstruation    • Hyperlipidemia    • Intermenstrual bleeding     irregular - suspect endometrial versus endocervical polyp   • Osteoarthritis of multiple joints    • Surgery follow-up    • Tobacco dependence syndrome    • Viral hepatitis C    • Wheezing        PAST SURGICAL HISTORY:  Past Surgical History:   Procedure Laterality Date   •  SECTION  2005    x2   • DENTAL PROCEDURE  1992    wisdom teeth   • DILATATION AND CURETTAGE      x2   • HYSTEROSCOPY     • INCISION AND DRAINAGE ABSCESS  2013    abscess of hand, septic arthritis, metatarsophalangeal joint, right hand   • INJECTION OF MEDICATION  2016    kenalog   • NECK SURGERY  1994 &    • SUPRACERVICAL HYSTERECTOMY SALPINGO OOPHORECTOMY Bilateral 2013    exam under anesthesia and supracervical hysterectomy with bilateral salpingo-ooporectomy. menometrorrhagia with stage 4 endometriosis   • TONSILLECTOMY          FAMILY HISTORY:  History reviewed. No pertinent family history.     SOCIAL HISTORY:  Social History     Social History   • Marital status:      Spouse name: N/A   • Number of children: N/A   • Years of education: N/A     Occupational History   • Not on file.     Social History Main Topics   • Smoking status: Current Every Day Smoker     Packs/day: 0.50     Years: 15.00   • Smokeless tobacco: Never Used   • Alcohol use No   • Drug use: No   • Sexual activity: Yes     Other Topics Concern   • Not on file     Social History Narrative       HOME MEDICATIONS:    (Not in a hospital admission)    ALLERGIES:  Penicillins; Seroquel [quetiapine fumarate]; Stadol [butorphanol]; and Wellbutrin [bupropion]    REVIEW OF SYSTEMS  Review of Systems   Unable to perform ROS: Intubated       PHYSICAL EXAM:  Temp:  [97.5 °F (36.4 °C)] 97.5 °F (36.4 °C)  Heart Rate:  [68-84] 76  Resp:  [14-16] 14  BP: ()/() 103/57  FiO2 (%):  [100 %] 100 %  Body mass index is 42.91 kg/(m^2).  Physical Exam   Constitutional: Vital signs are normal. She appears well-developed and well-nourished. No distress. She is sedated and intubated.   Patient intubated and sedated, no signs of acute trauma.    HENT:   Head: Normocephalic and atraumatic.   Right Ear: External ear normal.   Left Ear: External ear normal.   Nose: Nose normal.   Eyes: Lids are normal. Pupils are equal, round, and reactive to light. Right eye exhibits no discharge. Left eye exhibits no discharge. Right conjunctiva is not injected. Right conjunctiva has no hemorrhage. Left conjunctiva is not injected. Left conjunctiva has no hemorrhage.   Dilated pupils, reactive to light   Neck: Trachea normal. No erythema present. No thyroid mass present.   Cardiovascular: Normal rate and regular rhythm.  Exam reveals no decreased pulses.    Pulses:       Radial pulses are 2+ on the right side, and 2+ on the left side.        Dorsalis pedis pulses are 2+ on the right side, and  "2+ on the left side.   Pulmonary/Chest: She is intubated. She has decreased breath sounds in the left upper field, the left middle field and the left lower field. She has wheezes in the right lower field. She has rhonchi in the right lower field.   Abdominal: Soft. Normal appearance and bowel sounds are normal.   Neurological: She is unresponsive.   Skin: Skin is warm, dry and intact. She is not diaphoretic.   Psychiatric: She expresses suicidal (per EMS patient stated \"Maybe this will kill me\") ideation.   Nursing note and vitals reviewed.      DIAGNOSTIC DATA:   Lab Results (last 24 hours)     Procedure Component Value Units Date/Time    [place] Draw [[id number]] Collected:  04/09/17 1603    Specimen:  Blood Updated:  04/09/17 1609    Narrative:       The following orders were created for panel order [place] Draw.  Procedure                               Abnormality         Status                     ---------                               -----------         ------                     Light Blue Top[[id number]]                                    In process                 Green Top (Gel)[[id number]]                                   In process                 Lavender Top[[id number]]                                      In process                 Gold Top - SST[[id number]]                                    In process                   Please view results for these tests on the individual orders.    Gold Top - SST [[id number]] Collected:  04/09/17 1603    Specimen:  Blood Updated:  04/09/17 1609    Green Top (Gel) [[id number]] Collected:  04/09/17 1603    Specimen:  Blood Updated:  04/09/17 1609    Lavender Top [[id number]] Collected:  04/09/17 1603    Specimen:  Blood Updated:  04/09/17 1609    Light Blue Top [[id number]] Collected:  04/09/17 1603    Specimen:  Blood Updated:  04/09/17 1609    CBC & Differential [[id number]] Collected:  04/09/17 1603    Specimen:  Blood Updated:  04/09/17 1612    Narrative:       The following " orders were created for panel order CBC & Differential.  Procedure                               Abnormality         Status                     ---------                               -----------         ------                     CBC Auto Differential[[id number]]         Normal              Final result                 Please view results for these tests on the individual orders.    CBC Auto Differential [[id number]]  (Normal) Collected:  04/09/17 1603    Specimen:  Blood Updated:  04/09/17 1612     WBC 9.61 10*3/mm3      RBC 4.30 10*6/mm3      Hemoglobin 13.2 g/dL      Hematocrit 38.9 %      MCV 90.5 fL      MCH 30.7 pg      MCHC 33.9 g/dL      RDW 13.7 %      RDW-SD 45.3 fl      MPV 9.9 fL      Platelets 231 10*3/mm3      Neutrophil % 67.9 %      Lymphocyte % 21.1 %      Monocyte % 8.3 %      Eosinophil % 2.1 %      Basophil % 0.4 %      Immature Grans % 0.2 %      Neutrophils, Absolute 6.52 10*3/mm3      Lymphocytes, Absolute 2.03 10*3/mm3      Monocytes, Absolute 0.80 10*3/mm3      Eosinophils, Absolute 0.20 10*3/mm3      Basophils, Absolute 0.04 10*3/mm3      Immature Grans, Absolute 0.02 10*3/mm3     hCG, Serum, Qualitative [[id number]]  (Normal) Collected:  04/09/17 1603    Specimen:  Blood Updated:  04/09/17 1620     HCG Qualitative Negative    Comprehensive Metabolic Panel [[id number]]  (Abnormal) Collected:  04/09/17 1603    Specimen:  Blood Updated:  04/09/17 1622     Glucose 95 mg/dL      BUN 14 mg/dL      Creatinine 0.76 mg/dL      Sodium 139 mmol/L      Potassium 3.6 mmol/L      Chloride 106 mmol/L      CO2 21.0 (L) mmol/L      Calcium 9.2 mg/dL      Total Protein 7.0 g/dL      Albumin 3.90 g/dL      ALT (SGPT) 56 (H) U/L      AST (SGOT) 53 (H) U/L      Alkaline Phosphatase 101 U/L      Total Bilirubin 1.2 mg/dL      eGFR Non African Amer 83 mL/min/1.73      Globulin 3.1 gm/dL      A/G Ratio 1.3 g/dL      BUN/Creatinine Ratio 18.4     Anion Gap 12.0 mmol/L     Salicylate Level [[id number]]  (Abnormal)  Collected:  04/09/17 1603    Specimen:  Blood Updated:  04/09/17 1622     Salicylate <1.0 (L) mg/dL     Acetaminophen Level [[id number]]  (Abnormal) Collected:  04/09/17 1603    Specimen:  Blood Updated:  04/09/17 1625     Acetaminophen <10.0 (L) mcg/mL     Protime-INR [[id number]]  (Normal) Collected:  04/09/17 1603    Specimen:  Blood Updated:  04/09/17 1635     Protime 13.5 Seconds      INR 1.04    Narrative:       Therapeutic range for most indications is 2.0-3.0 INR,  or 2.5-3.5 for mechanical heart valves.    Ethanol [[id number]] Collected:  04/09/17 1603    Specimen:  Blood Updated:  04/09/17 1640     Ethanol <10 mg/dL      Ethanol % <0.010 %     TSH [[id number]]  (Normal) Collected:  04/09/17 1603    Specimen:  Blood Updated:  04/09/17 1653     TSH 1.150 mIU/mL     POC Glucose Fingerstick [[id number]]  (Normal) Collected:  04/09/17 1656    Specimen:  Blood Updated:  04/09/17 1656     Glucose 106 mg/dL     Respiratory Culture [[id number]] Collected:  04/09/17 1638    Specimen:  Sputum from ET Suction Updated:  04/09/17 1713     Gram Stain Result Many (4+) WBCs per low power field      Rare (1+) Epithelial cells per low power field      Mixed bacterial donny    Blood Gas, Arterial [[id number]]  (Abnormal) Collected:  04/09/17 1717    Specimen:  Arterial Blood Updated:  04/09/17 1717     Site --      Not performed at this site.        Gary's Test --      Not performed at this site.        pH, Arterial 7.292 (L) pH units      pCO2, Arterial 47.6 (H) mm Hg      pO2, Arterial 148.4 (H) mm Hg      HCO3, Arterial 22.5 mmol/L      Base Excess, Arterial -4.2 (L) mmol/L      O2 Saturation, Arterial 98.9 %      Hemoglobin, Blood Gas 13.4 g/dL      Hematocrit, Blood Gas 39.0 %      CO2 Content 23.9     Sodium, Arterial 144.6 mmol/L      Potassium, Arterial 3.55 (L) mmol/L      Glucose, Arterial 99 mmol/L      Barometric Pressure for Blood Gas -- mmHg       Not performed at this site.        Modality --      Not performed at this  site.        Ionized Calcium 4.9 mg/dL     Urine Drug Screen [[id number]]  (Abnormal) Collected:  04/09/17 1701    Specimen:  Urine from Urine, Clean Catch Updated:  04/09/17 1748     Amphetamine Screen, Urine Positive (A)     Barbiturates Screen, Urine Negative     Benzodiazepine Screen, Urine Negative     Cocaine Screen, Urine Negative     Methadone Screen, Urine Negative     Opiate Screen Negative     Oxycodone Screen, Urine Negative     THC, Screen, Urine Negative    Narrative:       Negative Thresholds For Drugs Screened in Urine:     Amphetamines          500 ng/ml  Barbiturates          200 ng/ml  Benzodiazepines       200 ng/ml  Cocaine               150 ng/ml  Methadone             300 ng/mL  Opiates               300 ng/mL  Oxycodone             100 ng/mL  THC                   20 ng/mL    The normal value for all drugs tested is negative. This report includes final unconfirmed screening results.  A positive result by this assay can be, at your request, sent to the Reference Lab for confirmation by gas chromatography. Unconfirmed results must not be used for non-medical purposes, such as employment or legal testing. Clinical consideration should be applied to any drug of abuse test result, particularly when unconfirmed results are used.           Imaging Results (last 24 hours)     ** No results found for the last 24 hours. **          I reviewed the patient's new clinical results.    ASSESSMENT AND PLAN: This is a 46 y.o. female with:    Active Hospital Problems (** Indicates Principal Problem)    Diagnosis Date Noted   • Overdose [T50.901A] 04/09/2017     Patient currently intubated and sedated  Will provide supportive management with IVF  Will maintain on cardiac monitor   Will obtain ABG in AM      • Suicidal overdose [T50.902A] 04/09/2017     Will obtain a psych consult when patient appropriate     • Atelectasis of left lung [J98.11] 04/09/2017     Possibly due to improper ET placement  ET repositioned  in ED   Will obtain CT scan   Will treat for possible aspiration PNA     • Obesity due to excess calories [E66.09] 2017   • Chronic hepatitis C virus infection [B18.2] 2017     Will monitor LFTs         Resolved Hospital Problems    Diagnosis Date Noted Date Resolved   No resolved problems to display.         DVT prophylaxis: Lovenox  Code status is Full Code  NATALY # [id number], reviewed and consistent with patient reported medications.      I discussed the patients findings and my recommendations with nursing staff.     Dr. Guthrie is the attending on record at time of admission, he is aware of the patient's status and agrees with the above history and physical.          This document has been electronically signed by Sandra Dominguez MD on 2017 5:56 PM         Electronically signed by Leo Guthrie DO at 2017  7:39 PM      Leo Guthrie DO at 2017  7:39 PM                Patient Care Team:  No Known Provider as PCP - General    Chief complaint 46 year old white female with suicidal attempt with TCA and Neurontin. Intubated in ED after worsening somnolence.    Subjective     Drug Overdose   This is a new problem. The current episode started today. The symptoms are aggravated by stress.   Suicidal   This is a new problem. The current episode started today.       Review of Systems   Unable to perform ROS: Intubated        Past Medical History:   Diagnosis Date   • Anxiety state    • Bronchitis    • Chest pain    • Depressive disorder    • Essential hypertension    • Excessive or frequent menstruation    • Hyperlipidemia    • Intermenstrual bleeding     irregular - suspect endometrial versus endocervical polyp   • Osteoarthritis of multiple joints    • Surgery follow-up    • Tobacco dependence syndrome    • Viral hepatitis C    • Wheezing      Past Surgical History:   Procedure Laterality Date   •  SECTION  2005    x2   • DENTAL PROCEDURE      wisdom teeth   •  DILATATION AND CURETTAGE      x2   • HYSTEROSCOPY     • INCISION AND DRAINAGE ABSCESS  09/06/2013    abscess of hand, septic arthritis, metatarsophalangeal joint, right hand   • INJECTION OF MEDICATION  07/19/2016    kenalog   • NECK SURGERY  1994 1993 & 1994   • SUPRACERVICAL HYSTERECTOMY SALPINGO OOPHORECTOMY Bilateral 12/03/2013    exam under anesthesia and supracervical hysterectomy with bilateral salpingo-ooporectomy. menometrorrhagia with stage 4 endometriosis   • TONSILLECTOMY  1978     History reviewed. No pertinent family history.  Social History   Substance Use Topics   • Smoking status: Current Every Day Smoker     Packs/day: 0.50     Years: 15.00   • Smokeless tobacco: Never Used      Comment: previous admission infromation, patient intubated unable to obtain infroomation at this tiime.    • Alcohol use None      Comment: unable to assess, patient intubated at this time.      Prescriptions Prior to Admission   Medication Sig Dispense Refill Last Dose   • albuterol (PROVENTIL HFA;VENTOLIN HFA) 108 (90 BASE) MCG/ACT inhaler Inhale 2 puffs every 4 (four) hours as needed for wheezing.   Taking   • amitriptyline (ELAVIL) 100 MG tablet Take  mg by mouth at night as needed for sleep.   Taking   • gabapentin (NEURONTIN) 600 MG tablet Take 600 mg by mouth 3 (Three) Times a Day.      • HYDROcodone-acetaminophen (NORCO) 5-325 MG per tablet TK 1 T PO Q 4 TO 6 HOURS PRN FOR PAIN  0 Not Taking   • lisinopril (PRINIVIL,ZESTRIL) 40 MG tablet Take 40 mg by mouth Daily.      • mirtazapine (REMERON) 15 MG tablet Take 15 mg by mouth Every Night.   Not Taking   • PARoxetine (PAXIL) 40 MG tablet Take 40 mg by mouth daily.   Taking     Allergies:  Penicillins; Seroquel [quetiapine fumarate]; Stadol [butorphanol]; and Wellbutrin [bupropion]    Objective      Vital Signs  Temp:  [97.5 °F (36.4 °C)-97.7 °F (36.5 °C)] 97.7 °F (36.5 °C)  Heart Rate:  [64-84] 67  Resp:  [12-16] 12  BP: ()/() 106/73  FiO2 (%):   [70 %-100 %] 70 %    Physical Exam   Constitutional: She appears well-developed and well-nourished. She is intubated.   HENT:   Head: Normocephalic and atraumatic.   Eyes: Conjunctivae are normal.   Neck: Normal range of motion. No tracheal deviation present.   Cardiovascular: Normal rate, regular rhythm and normal heart sounds.  Exam reveals no gallop and no friction rub.    No murmur heard.  Pulmonary/Chest: She is intubated. She is in respiratory distress. She has decreased breath sounds in the left upper field, the left middle field and the left lower field. She has no wheezes. She has no rhonchi. She has no rales.   Lymphadenopathy:     She has no cervical adenopathy.   Nursing note and vitals reviewed.      Results Review:   I reviewed the patient's new clinical results.  I reviewed the patient's new imaging results and agree with the interpretation.  I reviewed the patient's other test results and agree with the interpretation  I personally viewed and interpreted the patient's EKG/Telemetry data    Patient Name: MS. ISAEL FELDER  Patient ID: [id number]   Ordering: DEMOND SOLORIO  Attending: DERRICK MAI  Referring: DEMOND SOLORIO  ------------------------------------------------     PORTABLE CHEST     HISTORY: Position of endotracheal tube     Portable AP supine film of the chest was obtained at 5:54 PM.  COMPARISON: For 50 7:00 PM     Endotracheal tube now 3 cm above the anna marie.  Significant improvement in aeration of the left lung.  Nasogastric tube remains in place.  The heart is not enlarged.  The pulmonary vasculature is not increased.  No pleural effusion.  No pneumothorax.  No acute osseous abnormality.     IMPRESSION:  CONCLUSION:  Endotracheal tube now 3 cm above the anna marie.  Significant improvement in aeration of the left lung.  Nasogastric tube remains in place.     Lab Results   Component Value Date    WBC 9.61 04/09/2017    HGB 13.2 04/09/2017    HCT 38.9 04/09/2017    MCV 90.5  04/09/2017     04/09/2017     Lab Results   Component Value Date    GLUCOSE 99 04/09/2017    BUN 14 04/09/2017    CREATININE 0.76 04/09/2017    EGFRIFNONA 83 04/09/2017    BCR 18.4 04/09/2017    K 3.6 04/09/2017    CO2 21.0 (L) 04/09/2017    CALCIUM 9.2 04/09/2017    ALBUMIN 3.90 04/09/2017    LABIL2 1.3 04/09/2017    AST 53 (H) 04/09/2017    ALT 56 (H) 04/09/2017       Assessment/Plan     Active Problems:    Overdose    Suicidal overdose    Atelectasis of left lung    Obesity due to excess calories    Chronic hepatitis C virus infection      Assessment & Plan     Depression  Suicidal attempt  Overdose  Respiratory failure with intubation and mechanical ventilation  Depression  Left lung atelectasis/aspiration    Support medically  Consult pulmonary with management of ventilator  Will consult  post extubation and when patient cooperative    Leo Guthrie DO  04/09/17  7:39 PM    I have examined the patient and reviewed the pertinent diagnostic data. I have discussed the case with the Family Medicine Resident and agree with the assessment and plan of care.    Leo Guthrie DO           This document has been electronically signed by Leo Guthrie DO on April 9, 2017 7:49 PM       Electronically signed by Leo Guthrie DO at 4/9/2017  7:49 PM        Hospital Medications (active)       Dose Frequency Start End    albuterol (PROVENTIL) nebulizer solution 0.083% 2.5 mg/3mL 2.5 mg Every 4 Hours - RT 4/9/2017     Sig - Route: Take 2.5 mg by nebulization Every 4 (Four) Hours. - Nebulization    ammonia inhaler 1 each 1 ampule Once 4/9/2017 4/9/2017    Sig - Route: Inhale 1 each 1 (One) Time. - Inhalation    clindamycin (CLEOCIN) injection 600 mg 600 mg Every 8 Hours Scheduled 4/9/2017     Sig - Route: Inject 4 mL into the shoulder, thigh, or buttocks Every 8 (Eight) Hours. - Intramuscular    dextrose 5 % and sodium chloride 0.45 % with KCl 20 mEq/L infusion 100 mL/hr Continuous 4/9/2017      Sig - Route: Infuse 100 mL/hr into a venous catheter Continuous. - Intravenous    enoxaparin (LOVENOX) syringe 40 mg 40 mg Daily 4/10/2017     Sig - Route: Inject 0.4 mL under the skin Daily. - Subcutaneous    etomidate (AMIDATE) injection  Code / Trauma / Sedation Medication 2017    Sig - Route: Infuse  into a venous catheter Emergency Use. - Intravenous    propofol (DIPRIVAN) infusion 10 mg/mL 100 mL 5-50 mcg/kg/min × 95.3 kg Titrated 2017     Sig - Route: Infuse 476.5-4,765 mcg/min into a venous catheter Dose Adjusted By Provider As Needed. - Intravenous    sodium chloride 0.9 % bolus 500 mL 500 mL Once 2017     Sig - Route: Infuse 500 mL into a venous catheter 1 (One) Time. - Intravenous    sodium chloride 0.9 % flush 1-10 mL 1-10 mL As Needed 2017     Sig - Route: Infuse 1-10 mL into a venous catheter As Needed for Line Care. - Intravenous    sodium chloride 0.9 % flush 10 mL 10 mL As Needed 2017     Sig - Route: Infuse 10 mL into a venous catheter As Needed for Line Care. - Intravenous    succinylcholine (ANECTINE) injection  Code / Trauma / Sedation Medication 2017    Sig - Route: Infuse  into a venous catheter Emergency Use. - Intravenous             Physician Progress Notes (last 72 hours) (Notes from 2017  9:24 AM through 4/10/2017  9:24 AM)      Sandra Dominguez MD at 4/10/2017  6:58 AM  Version 1 of 1    Attestation signed by Leo Guthrie DO at 4/10/2017  8:35 AM        I have examined the patient and reviewed the pertinent diagnostic data. I have discussed the case with the Family Medicine Resident and agree with the assessment and plan of care.    Leo Guthrie DO           This document has been electronically signed by Leo Guthrie DO on April 10, 2017 8:35 AM                                 FAMILY MEDICINE DAILY PROGRESS NOTE  NAME: Rozina Mccartney  : 1970  MRN: [id number]     LOS: 1 day     PROVIDER OF SERVICE: Sandra Rowland  MD Alberto    Chief Complaint: Intentional overdose with gabapentin and elavil    Subjective:     Interval History:  History taken from: chart RN  Patient Complaints: unobtainable, on ventilator  Patient Denies:  Unobtainable, on ventilator    Patient still intubated/sedated with propofol.  No acute events overnight. Urine with sediment in sanchez.  OG with ~200ML.       Review of Systems:   Review of Systems   Unable to perform ROS: Intubated       Objective:     Vital Signs  Temp:  [97.5 °F (36.4 °C)-98.2 °F (36.8 °C)] 98.2 °F (36.8 °C)  Heart Rate:  [64-91] 88  Resp:  [12-20] 20  BP: ()/() 128/74  FiO2 (%):  [30 %-100 %] 30 %    Physical Exam  Physical Exam   Constitutional: She appears well-developed and well-nourished. No distress.   HENT:   Head: Normocephalic and atraumatic.   Nose: Nose normal.   OG and ET in place   Eyes: Pupils are equal, round, and reactive to light. Right eye exhibits no discharge. Left eye exhibits no discharge.   Cardiovascular: Normal rate, regular rhythm, normal heart sounds and intact distal pulses.    Pulmonary/Chest: No respiratory distress.   Intubated, coarse BS bilaterally   Abdominal: Soft. Bowel sounds are normal. She exhibits no distension. There is no tenderness.   Lymphadenopathy:     She has no cervical adenopathy.   Neurological:   Will respond to verbal commands, can move hands and feet   Skin: Skin is warm and dry. No rash noted. She is not diaphoretic. No erythema.   Nursing note and vitals reviewed.      Medication Review    Current Facility-Administered Medications:   •  albuterol (PROVENTIL) nebulizer solution 0.083% 2.5 mg/3mL, 2.5 mg, Nebulization, Q4H - RT, Sandra Dominguez MD  •  clindamycin (CLEOCIN) injection 600 mg, 600 mg, Intramuscular, Q8H, Sandra Dominguez MD, 600 mg at 04/10/17 0631  •  dextrose 5 % and sodium chloride 0.45 % with KCl 20 mEq/L infusion, 100 mL/hr, Intravenous, Continuous, Sandra Dominguez MD, Last Rate: 100 mL/hr at  04/09/17 2232, 100 mL/hr at 04/09/17 2232  •  enoxaparin (LOVENOX) syringe 40 mg, 40 mg, Subcutaneous, Daily, Sandra Dominguez MD  •  propofol (DIPRIVAN) infusion 10 mg/mL 100 mL, 5-50 mcg/kg/min, Intravenous, Titrated, Ozzy Angel MD, Last Rate: 17.15 mL/hr at 04/10/17 0414, 30 mcg/kg/min at 04/10/17 0414  •  sodium chloride 0.9 % bolus 500 mL, 500 mL, Intravenous, Once, Sandra Dominguez MD  •  sodium chloride 0.9 % flush 1-10 mL, 1-10 mL, Intravenous, PRN, Sandra Dominguez MD  •  sodium chloride 0.9 % flush 10 mL, 10 mL, Intravenous, PRN, Ozzy Angel MD     Diagnostic Data    Lab Results (last 24 hours)     Procedure Component Value Units Date/Time    CBC & Differential [[id number]] Collected:  04/09/17 1603    Specimen:  Blood Updated:  04/09/17 1612    Narrative:       The following orders were created for panel order CBC & Differential.  Procedure                               Abnormality         Status                     ---------                               -----------         ------                     CBC Auto Differential[[id number]]         Normal              Final result                 Please view results for these tests on the individual orders.    CBC Auto Differential [[id number]]  (Normal) Collected:  04/09/17 1603    Specimen:  Blood Updated:  04/09/17 1612     WBC 9.61 10*3/mm3      RBC 4.30 10*6/mm3      Hemoglobin 13.2 g/dL      Hematocrit 38.9 %      MCV 90.5 fL      MCH 30.7 pg      MCHC 33.9 g/dL      RDW 13.7 %      RDW-SD 45.3 fl      MPV 9.9 fL      Platelets 231 10*3/mm3      Neutrophil % 67.9 %      Lymphocyte % 21.1 %      Monocyte % 8.3 %      Eosinophil % 2.1 %      Basophil % 0.4 %      Immature Grans % 0.2 %      Neutrophils, Absolute 6.52 10*3/mm3      Lymphocytes, Absolute 2.03 10*3/mm3      Monocytes, Absolute 0.80 10*3/mm3      Eosinophils, Absolute 0.20 10*3/mm3      Basophils, Absolute 0.04 10*3/mm3      Immature Grans, Absolute 0.02 10*3/mm3     hCG,  Serum, Qualitative [[id number]]  (Normal) Collected:  04/09/17 1603    Specimen:  Blood Updated:  04/09/17 1620     HCG Qualitative Negative    Comprehensive Metabolic Panel [[id number]]  (Abnormal) Collected:  04/09/17 1603    Specimen:  Blood Updated:  04/09/17 1622     Glucose 95 mg/dL      BUN 14 mg/dL      Creatinine 0.76 mg/dL      Sodium 139 mmol/L      Potassium 3.6 mmol/L      Chloride 106 mmol/L      CO2 21.0 (L) mmol/L      Calcium 9.2 mg/dL      Total Protein 7.0 g/dL      Albumin 3.90 g/dL      ALT (SGPT) 56 (H) U/L      AST (SGOT) 53 (H) U/L      Alkaline Phosphatase 101 U/L      Total Bilirubin 1.2 mg/dL      eGFR Non African Amer 83 mL/min/1.73      Globulin 3.1 gm/dL      A/G Ratio 1.3 g/dL      BUN/Creatinine Ratio 18.4     Anion Gap 12.0 mmol/L     Salicylate Level [[id number]]  (Abnormal) Collected:  04/09/17 1603    Specimen:  Blood Updated:  04/09/17 1622     Salicylate <1.0 (L) mg/dL     Acetaminophen Level [[id number]]  (Abnormal) Collected:  04/09/17 1603    Specimen:  Blood Updated:  04/09/17 1625     Acetaminophen <10.0 (L) mcg/mL     Protime-INR [[id number]]  (Normal) Collected:  04/09/17 1603    Specimen:  Blood Updated:  04/09/17 1635     Protime 13.5 Seconds      INR 1.04    Narrative:       Therapeutic range for most indications is 2.0-3.0 INR,  or 2.5-3.5 for mechanical heart valves.    Ethanol [[id number]] Collected:  04/09/17 1603    Specimen:  Blood Updated:  04/09/17 1640     Ethanol <10 mg/dL      Ethanol % <0.010 %     TSH [[id number]]  (Normal) Collected:  04/09/17 1603    Specimen:  Blood Updated:  04/09/17 1653     TSH 1.150 mIU/mL     POC Glucose Fingerstick [[id number]]  (Normal) Collected:  04/09/17 1656    Specimen:  Blood Updated:  04/09/17 1656     Glucose 106 mg/dL     Blood Gas, Arterial [[id number]]  (Abnormal) Collected:  04/09/17 1717    Specimen:  Arterial Blood Updated:  04/09/17 1717     Site --      Not performed at this site.        Gary's Test --      Not performed at this  site.        pH, Arterial 7.292 (L) pH units      pCO2, Arterial 47.6 (H) mm Hg      pO2, Arterial 148.4 (H) mm Hg      HCO3, Arterial 22.5 mmol/L      Base Excess, Arterial -4.2 (L) mmol/L      O2 Saturation, Arterial 98.9 %      Hemoglobin, Blood Gas 13.4 g/dL      Hematocrit, Blood Gas 39.0 %      CO2 Content 23.9     Sodium, Arterial 144.6 mmol/L      Potassium, Arterial 3.55 (L) mmol/L      Glucose, Arterial 99 mmol/L      Barometric Pressure for Blood Gas -- mmHg       Not performed at this site.        Modality --      Not performed at this site.        Ionized Calcium 4.9 mg/dL     Urine Drug Screen [[id number]]  (Abnormal) Collected:  04/09/17 1701    Specimen:  Urine from Urine, Clean Catch Updated:  04/09/17 1748     Amphetamine Screen, Urine Positive (A)     Barbiturates Screen, Urine Negative     Benzodiazepine Screen, Urine Negative     Cocaine Screen, Urine Negative     Methadone Screen, Urine Negative     Opiate Screen Negative     Oxycodone Screen, Urine Negative     THC, Screen, Urine Negative    Narrative:       Negative Thresholds For Drugs Screened in Urine:     Amphetamines          500 ng/ml  Barbiturates          200 ng/ml  Benzodiazepines       200 ng/ml  Cocaine               150 ng/ml  Methadone             300 ng/mL  Opiates               300 ng/mL  Oxycodone             100 ng/mL  THC                   20 ng/mL    The normal value for all drugs tested is negative. This report includes final unconfirmed screening results.  A positive result by this assay can be, at your request, sent to the Reference Lab for confirmation by gas chromatography. Unconfirmed results must not be used for non-medical purposes, such as employment or legal testing. Clinical consideration should be applied to any drug of abuse test result, particularly when unconfirmed results are used.    POC Glucose Fingerstick [[id number]]  (Normal) Collected:  04/09/17 1655    Specimen:  Blood Updated:  04/09/17 1808     Glucose  106 mg/dL       Meter: [id number]: [id number] ALONDRA MANCIA       [place] Draw [[id number]] Collected:  04/09/17 1603    Specimen:  Blood Updated:  04/09/17 2101    Narrative:       The following orders were created for panel order [place] Draw.  Procedure                               Abnormality         Status                     ---------                               -----------         ------                     Light Blue Top[[id number]]                                    Final result               Green Top (Gel)[[id number]]                                   Final result               Lavender Top[[id number]]                                      Final result               Gold Top - SST[[id number]]                                    Final result                 Please view results for these tests on the individual orders.    Light Blue Top [[id number]] Collected:  04/09/17 1603    Specimen:  Blood Updated:  04/09/17 2101     Extra Tube hold for add-on      Auto resulted       Green Top (Gel) [[id number]] Collected:  04/09/17 1603    Specimen:  Blood Updated:  04/09/17 2101     Extra Tube Hold for add-ons.      Auto resulted.       Lavender Top [[id number]] Collected:  04/09/17 1603    Specimen:  Blood Updated:  04/09/17 2101     Extra Tube hold for add-on      Auto resulted       Gold Top - SST [[id number]] Collected:  04/09/17 1603    Specimen:  Blood Updated:  04/09/17 2101     Extra Tube Hold for add-ons.      Auto resulted.       Blood Culture [[id number]] Collected:  04/09/17 2112    Specimen:  Blood from Wrist, Left Updated:  04/09/17 2116    Blood Culture [[id number]] Collected:  04/09/17 2102    Specimen:  Blood from Arm, Left Updated:  04/09/17 2116    Lactic Acid, Plasma [[id number]]  (Normal) Collected:  04/09/17 2112    Specimen:  Blood Updated:  04/09/17 2132     Lactate 0.5 mmol/L     Troponin [[id number]]  (Normal) Collected:  04/09/17 2112    Specimen:  Blood Updated:  04/09/17 2242     Troponin I <0.012 ng/mL      CBC & Differential [[id number]] Collected:  04/10/17 0400    Specimen:  Blood Updated:  04/10/17 0409    Narrative:       The following orders were created for panel order CBC & Differential.  Procedure                               Abnormality         Status                     ---------                               -----------         ------                     CBC Auto Differential[[id number]]         Abnormal            Final result                 Please view results for these tests on the individual orders.    CBC Auto Differential [[id number]]  (Abnormal) Collected:  04/10/17 0400    Specimen:  Blood Updated:  04/10/17 0409     WBC 11.00 (H) 10*3/mm3      RBC 4.54 10*6/mm3      Hemoglobin 14.0 g/dL      Hematocrit 41.8 %      MCV 92.1 fL      MCH 30.8 pg      MCHC 33.5 g/dL      RDW 14.0 %      RDW-SD 47.6 (H) fl      MPV 9.8 fL      Platelets 189 10*3/mm3      Neutrophil % 75.9 %      Lymphocyte % 14.3 %      Monocyte % 7.0 %      Eosinophil % 2.0 %      Basophil % 0.5 %      Immature Grans % 0.3 %      Neutrophils, Absolute 8.36 10*3/mm3      Lymphocytes, Absolute 1.57 10*3/mm3      Monocytes, Absolute 0.77 10*3/mm3      Eosinophils, Absolute 0.22 10*3/mm3      Basophils, Absolute 0.05 10*3/mm3      Immature Grans, Absolute 0.03 (H) 10*3/mm3     Comprehensive Metabolic Panel [[id number]]  (Abnormal) Collected:  04/10/17 0400    Specimen:  Blood Updated:  04/10/17 0422     Glucose 97 mg/dL      BUN 11 mg/dL      Creatinine 0.80 mg/dL      Sodium 145 mmol/L      Potassium 3.6 mmol/L      Chloride 104 mmol/L      CO2 27.0 mmol/L      Calcium 8.7 mg/dL      Total Protein 7.1 g/dL      Albumin 3.90 g/dL      ALT (SGPT) 61 (H) U/L      AST (SGOT) 62 (H) U/L      Alkaline Phosphatase 97 U/L      Total Bilirubin 0.9 mg/dL      eGFR Non African Amer 78 mL/min/1.73      Globulin 3.2 gm/dL      A/G Ratio 1.2 g/dL      BUN/Creatinine Ratio 13.8     Anion Gap 14.0 mmol/L     Troponin [[id number]]  (Normal) Collected:   04/10/17 0400    Specimen:  Blood Updated:  04/10/17 0428     Troponin I <0.012 ng/mL     Blood Gas, Arterial [[id number]]  (Abnormal) Collected:  04/10/17 0456    Specimen:  Arterial Blood Updated:  04/10/17 0516     Site --      Not performed at this site.        Gary's Test --      Not performed at this site.        pH, Arterial 7.466 (H) pH units      pCO2, Arterial 35.8 mm Hg      pO2, Arterial 71.8 (L) mm Hg      HCO3, Arterial 25.2 mmol/L      Base Excess, Arterial 1.8 mmol/L      O2 Saturation, Arterial 95.7 %      Hemoglobin, Blood Gas 14.4 g/dL      Hematocrit, Blood Gas 42.0 %      CO2 Content 26.3     Sodium, Arterial 142.3 mmol/L      Potassium, Arterial 3.47 (L) mmol/L      Glucose, Arterial 108 mmol/L      Barometric Pressure for Blood Gas -- mmHg       Not performed at this site.        Modality --      Not performed at this site.        Ionized Calcium 4.7 mg/dL     Respiratory Culture [[id number]] Collected:  04/09/17 1638    Specimen:  Sputum from ET Suction Updated:  04/10/17 0619     Respiratory Culture Normal Respiratory Donny     Gram Stain Result Many (4+) WBCs per low power field      Rare (1+) Epithelial cells per low power field      Mixed bacterial donny            I reviewed the patient's new clinical results.    Assessment/Plan:     Active Hospital Problems (** Indicates Principal Problem)    Diagnosis Date Noted   • Overdose [T50.901A] 04/09/2017     Patient currently intubated and sedated  UDS positive for amphetamines  Patient reported taking gabapentin and Elavil per EMS   Will provide supportive management with IVF  Will maintain on cardiac monitor   Will attempt extubation today.      • Suicidal overdose [T50.902A] 04/09/2017     Will obtain a psych consult when patient appropriate     • Atelectasis of left lung [J98.11] 04/09/2017     Possibly due to improper ET placement  ET repositioned in ED   CT scan review shows atelectasis, possible infiltrate.    Will treat for possible  aspiration PNA with Clinda IV     • Obesity due to excess calories [E66.09] 04/09/2017   • Chronic hepatitis C virus infection [B18.2] 04/09/2017     Will monitor LFTs         Resolved Hospital Problems    Diagnosis Date Noted Date Resolved   No resolved problems to display.       DVT prophylaxis: Lovenox  Code status is Full Code    Plan for disposition:Psych eval once medically stable      Time: Critical care 30 min           This document has been electronically signed by Sandra Dominguez MD on April 10, 2017 7:18 AM         Electronically signed by Leo Guthrie DO at 4/10/2017  8:35 AM      Leo Guthrie DO at 4/10/2017  8:35 AM  Version 1 of 1              LOS: 1 day   Patient Care Team:  No Known Provider as PCP - General    Chief Complaint: On vent secondary to O/D suicide attempt.      Subjective     Interval History:     Patient Complaints: none  Patient Denies:  none  History taken from: chart    Review of Systems:    Review of Systems   Unable to perform ROS: Intubated       Objective     Vital Signs  Temp:  [97.5 °F (36.4 °C)-98.2 °F (36.8 °C)] 98.2 °F (36.8 °C)  Heart Rate:  [64-91] 86  Resp:  [12-20] 17  BP: ()/() 128/74  FiO2 (%):  [30 %-100 %] 30 %    Physical Exam:   Physical Exam   Constitutional: Vital signs are normal. She appears well-developed and well-nourished. She is sedated and intubated.   HENT:   Head: Normocephalic and atraumatic.   Mouth/Throat: Oropharynx is clear and moist.   Eyes: EOM are normal.   Neck: Normal range of motion. Neck supple.   Cardiovascular: Normal rate, regular rhythm and normal heart sounds.    Pulmonary/Chest: She is intubated. She has decreased breath sounds in the left upper field, the left middle field and the left lower field.   Abdominal: Soft. Bowel sounds are normal. She exhibits no distension. There is no tenderness.   Neurological:   Sedated and intubated   Skin: Skin is warm and dry.   Psychiatric: Her speech is normal.  Cognition and memory are normal.        Results Review:       Lab Results (last 24 hours)     Procedure Component Value Units Date/Time    CBC & Differential [[id number]] Collected:  04/09/17 1603    Specimen:  Blood Updated:  04/09/17 1612    Narrative:       The following orders were created for panel order CBC & Differential.  Procedure                               Abnormality         Status                     ---------                               -----------         ------                     CBC Auto Differential[[id number]]         Normal              Final result                 Please view results for these tests on the individual orders.    CBC Auto Differential [[id number]]  (Normal) Collected:  04/09/17 1603    Specimen:  Blood Updated:  04/09/17 1612     WBC 9.61 10*3/mm3      RBC 4.30 10*6/mm3      Hemoglobin 13.2 g/dL      Hematocrit 38.9 %      MCV 90.5 fL      MCH 30.7 pg      MCHC 33.9 g/dL      RDW 13.7 %      RDW-SD 45.3 fl      MPV 9.9 fL      Platelets 231 10*3/mm3      Neutrophil % 67.9 %      Lymphocyte % 21.1 %      Monocyte % 8.3 %      Eosinophil % 2.1 %      Basophil % 0.4 %      Immature Grans % 0.2 %      Neutrophils, Absolute 6.52 10*3/mm3      Lymphocytes, Absolute 2.03 10*3/mm3      Monocytes, Absolute 0.80 10*3/mm3      Eosinophils, Absolute 0.20 10*3/mm3      Basophils, Absolute 0.04 10*3/mm3      Immature Grans, Absolute 0.02 10*3/mm3     hCG, Serum, Qualitative [[id number]]  (Normal) Collected:  04/09/17 1603    Specimen:  Blood Updated:  04/09/17 1620     HCG Qualitative Negative    Comprehensive Metabolic Panel [[id number]]  (Abnormal) Collected:  04/09/17 1603    Specimen:  Blood Updated:  04/09/17 1622     Glucose 95 mg/dL      BUN 14 mg/dL      Creatinine 0.76 mg/dL      Sodium 139 mmol/L      Potassium 3.6 mmol/L      Chloride 106 mmol/L      CO2 21.0 (L) mmol/L      Calcium 9.2 mg/dL      Total Protein 7.0 g/dL      Albumin 3.90 g/dL      ALT (SGPT) 56 (H) U/L      AST (SGOT) 53  (H) U/L      Alkaline Phosphatase 101 U/L      Total Bilirubin 1.2 mg/dL      eGFR Non African Amer 83 mL/min/1.73      Globulin 3.1 gm/dL      A/G Ratio 1.3 g/dL      BUN/Creatinine Ratio 18.4     Anion Gap 12.0 mmol/L     Salicylate Level [[id number]]  (Abnormal) Collected:  04/09/17 1603    Specimen:  Blood Updated:  04/09/17 1622     Salicylate <1.0 (L) mg/dL     Acetaminophen Level [[id number]]  (Abnormal) Collected:  04/09/17 1603    Specimen:  Blood Updated:  04/09/17 1625     Acetaminophen <10.0 (L) mcg/mL     Protime-INR [[id number]]  (Normal) Collected:  04/09/17 1603    Specimen:  Blood Updated:  04/09/17 1635     Protime 13.5 Seconds      INR 1.04    Narrative:       Therapeutic range for most indications is 2.0-3.0 INR,  or 2.5-3.5 for mechanical heart valves.    Ethanol [[id number]] Collected:  04/09/17 1603    Specimen:  Blood Updated:  04/09/17 1640     Ethanol <10 mg/dL      Ethanol % <0.010 %     TSH [[id number]]  (Normal) Collected:  04/09/17 1603    Specimen:  Blood Updated:  04/09/17 1653     TSH 1.150 mIU/mL     POC Glucose Fingerstick [[id number]]  (Normal) Collected:  04/09/17 1656    Specimen:  Blood Updated:  04/09/17 1656     Glucose 106 mg/dL     Blood Gas, Arterial [[id number]]  (Abnormal) Collected:  04/09/17 1717    Specimen:  Arterial Blood Updated:  04/09/17 1717     Site --      Not performed at this site.        Gary's Test --      Not performed at this site.        pH, Arterial 7.292 (L) pH units      pCO2, Arterial 47.6 (H) mm Hg      pO2, Arterial 148.4 (H) mm Hg      HCO3, Arterial 22.5 mmol/L      Base Excess, Arterial -4.2 (L) mmol/L      O2 Saturation, Arterial 98.9 %      Hemoglobin, Blood Gas 13.4 g/dL      Hematocrit, Blood Gas 39.0 %      CO2 Content 23.9     Sodium, Arterial 144.6 mmol/L      Potassium, Arterial 3.55 (L) mmol/L      Glucose, Arterial 99 mmol/L      Barometric Pressure for Blood Gas -- mmHg       Not performed at this site.        Modality --      Not performed at  this site.        Ionized Calcium 4.9 mg/dL     Urine Drug Screen [[id number]]  (Abnormal) Collected:  04/09/17 1701    Specimen:  Urine from Urine, Clean Catch Updated:  04/09/17 1748     Amphetamine Screen, Urine Positive (A)     Barbiturates Screen, Urine Negative     Benzodiazepine Screen, Urine Negative     Cocaine Screen, Urine Negative     Methadone Screen, Urine Negative     Opiate Screen Negative     Oxycodone Screen, Urine Negative     THC, Screen, Urine Negative    Narrative:       Negative Thresholds For Drugs Screened in Urine:     Amphetamines          500 ng/ml  Barbiturates          200 ng/ml  Benzodiazepines       200 ng/ml  Cocaine               150 ng/ml  Methadone             300 ng/mL  Opiates               300 ng/mL  Oxycodone             100 ng/mL  THC                   20 ng/mL    The normal value for all drugs tested is negative. This report includes final unconfirmed screening results.  A positive result by this assay can be, at your request, sent to the Reference Lab for confirmation by gas chromatography. Unconfirmed results must not be used for non-medical purposes, such as employment or legal testing. Clinical consideration should be applied to any drug of abuse test result, particularly when unconfirmed results are used.    POC Glucose Fingerstick [[id number]]  (Normal) Collected:  04/09/17 1655    Specimen:  Blood Updated:  04/09/17 1808     Glucose 106 mg/dL       Meter: [id number]: [id number] ALONDRA BLANCHE       [place] Draw [[id number]] Collected:  04/09/17 1603    Specimen:  Blood Updated:  04/09/17 2101    Narrative:       The following orders were created for panel order [place] Draw.  Procedure                               Abnormality         Status                     ---------                               -----------         ------                     Light Blue Top[[id number]]                                    Final result               Green Top (Gel)[[id number]]                                    Final result               Lavender Top[[id number]]                                      Final result               Gold Top - SST[[id number]]                                    Final result                 Please view results for these tests on the individual orders.    Light Blue Top [[id number]] Collected:  04/09/17 1603    Specimen:  Blood Updated:  04/09/17 2101     Extra Tube hold for add-on      Auto resulted       Green Top (Gel) [[id number]] Collected:  04/09/17 1603    Specimen:  Blood Updated:  04/09/17 2101     Extra Tube Hold for add-ons.      Auto resulted.       Lavender Top [[id number]] Collected:  04/09/17 1603    Specimen:  Blood Updated:  04/09/17 2101     Extra Tube hold for add-on      Auto resulted       Gold Top - SST [[id number]] Collected:  04/09/17 1603    Specimen:  Blood Updated:  04/09/17 2101     Extra Tube Hold for add-ons.      Auto resulted.       Blood Culture [[id number]] Collected:  04/09/17 2112    Specimen:  Blood from Wrist, Left Updated:  04/09/17 2116    Blood Culture [[id number]] Collected:  04/09/17 2102    Specimen:  Blood from Arm, Left Updated:  04/09/17 2116    Lactic Acid, Plasma [[id number]]  (Normal) Collected:  04/09/17 2112    Specimen:  Blood Updated:  04/09/17 2132     Lactate 0.5 mmol/L     Troponin [[id number]]  (Normal) Collected:  04/09/17 2112    Specimen:  Blood Updated:  04/09/17 2242     Troponin I <0.012 ng/mL     CBC & Differential [[id number]] Collected:  04/10/17 0400    Specimen:  Blood Updated:  04/10/17 0409    Narrative:       The following orders were created for panel order CBC & Differential.  Procedure                               Abnormality         Status                     ---------                               -----------         ------                     CBC Auto Differential[[id number]]         Abnormal            Final result                 Please view results for these tests on the individual orders.    CBC Auto Differential  [[id number]]  (Abnormal) Collected:  04/10/17 0400    Specimen:  Blood Updated:  04/10/17 0409     WBC 11.00 (H) 10*3/mm3      RBC 4.54 10*6/mm3      Hemoglobin 14.0 g/dL      Hematocrit 41.8 %      MCV 92.1 fL      MCH 30.8 pg      MCHC 33.5 g/dL      RDW 14.0 %      RDW-SD 47.6 (H) fl      MPV 9.8 fL      Platelets 189 10*3/mm3      Neutrophil % 75.9 %      Lymphocyte % 14.3 %      Monocyte % 7.0 %      Eosinophil % 2.0 %      Basophil % 0.5 %      Immature Grans % 0.3 %      Neutrophils, Absolute 8.36 10*3/mm3      Lymphocytes, Absolute 1.57 10*3/mm3      Monocytes, Absolute 0.77 10*3/mm3      Eosinophils, Absolute 0.22 10*3/mm3      Basophils, Absolute 0.05 10*3/mm3      Immature Grans, Absolute 0.03 (H) 10*3/mm3     Comprehensive Metabolic Panel [[id number]]  (Abnormal) Collected:  04/10/17 0400    Specimen:  Blood Updated:  04/10/17 0422     Glucose 97 mg/dL      BUN 11 mg/dL      Creatinine 0.80 mg/dL      Sodium 145 mmol/L      Potassium 3.6 mmol/L      Chloride 104 mmol/L      CO2 27.0 mmol/L      Calcium 8.7 mg/dL      Total Protein 7.1 g/dL      Albumin 3.90 g/dL      ALT (SGPT) 61 (H) U/L      AST (SGOT) 62 (H) U/L      Alkaline Phosphatase 97 U/L      Total Bilirubin 0.9 mg/dL      eGFR Non African Amer 78 mL/min/1.73      Globulin 3.2 gm/dL      A/G Ratio 1.2 g/dL      BUN/Creatinine Ratio 13.8     Anion Gap 14.0 mmol/L     Troponin [[id number]]  (Normal) Collected:  04/10/17 0400    Specimen:  Blood Updated:  04/10/17 0428     Troponin I <0.012 ng/mL     Blood Gas, Arterial [[id number]]  (Abnormal) Collected:  04/10/17 0456    Specimen:  Arterial Blood Updated:  04/10/17 0516     Site --      Not performed at this site.        Gary's Test --      Not performed at this site.        pH, Arterial 7.466 (H) pH units      pCO2, Arterial 35.8 mm Hg      pO2, Arterial 71.8 (L) mm Hg      HCO3, Arterial 25.2 mmol/L      Base Excess, Arterial 1.8 mmol/L      O2 Saturation, Arterial 95.7 %      Hemoglobin, Blood Gas  14.4 g/dL      Hematocrit, Blood Gas 42.0 %      CO2 Content 26.3     Sodium, Arterial 142.3 mmol/L      Potassium, Arterial 3.47 (L) mmol/L      Glucose, Arterial 108 mmol/L      Barometric Pressure for Blood Gas -- mmHg       Not performed at this site.        Modality --      Not performed at this site.        Ionized Calcium 4.7 mg/dL     Respiratory Culture [[id number]] Collected:  04/09/17 1638    Specimen:  Sputum from ET Suction Updated:  04/10/17 0619     Respiratory Culture Normal Respiratory Donny     Gram Stain Result Many (4+) WBCs per low power field      Rare (1+) Epithelial cells per low power field      Mixed bacterial donny          Medication Review:   Current Facility-Administered Medications   Medication Dose Route Frequency Provider Last Rate Last Dose   • albuterol (PROVENTIL) nebulizer solution 0.083% 2.5 mg/3mL  2.5 mg Nebulization Q4H - RT Sandra Dominguez MD       • clindamycin (CLEOCIN) injection 600 mg  600 mg Intramuscular Q8H Sandra Dominguez MD   600 mg at 04/10/17 0631   • dextrose 5 % and sodium chloride 0.45 % with KCl 20 mEq/L infusion  100 mL/hr Intravenous Continuous Sandra Dominguez  mL/hr at 04/09/17 2232 100 mL/hr at 04/09/17 2232   • enoxaparin (LOVENOX) syringe 40 mg  40 mg Subcutaneous Daily Sandra Dominguez MD       • propofol (DIPRIVAN) infusion 10 mg/mL 100 mL  5-50 mcg/kg/min Intravenous Titrated Ozzy Angel MD 17.15 mL/hr at 04/10/17 0414 30 mcg/kg/min at 04/10/17 0414   • sodium chloride 0.9 % bolus 500 mL  500 mL Intravenous Once Sandra Dominguez MD       • sodium chloride 0.9 % flush 1-10 mL  1-10 mL Intravenous PRN Sandra Dominguez MD       • sodium chloride 0.9 % flush 10 mL  10 mL Intravenous PRN Ozzy Angel MD           Exam: AP portable chest.     INDICATION: Intubated     COMPARISON: 4/9/2017     FINDINGS: Endotracheal tube tip is 2.7 cm above the anna marie. NG  tube extends into the stomach. Heart size is normal.  Perhaps  slight decrease in the right upper lobe consolidation. There is a  no significant interval change in the left lower lobe atelectasis  and/or consolidation.     IMPRESSION:  Perhaps slight improvement in aeration in the right  upper lung     Electronically signed by: Daren Cedeno MD 4/10/2017 6:35 AM  CDT Workstation: [id number]               Assessment/Plan     Active Problems:    Overdose    Suicidal overdose    Atelectasis of left lung    Obesity due to excess calories    Chronic hepatitis C virus infection    Plan    Extubate later today per pulm.    I have examined the patient and reviewed the pertinent diagnostic data. I have discussed the case with the Family Medicine Resident and agree with the assessment and plan of care.    Leo Guthrie DO             This document has been electronically signed by Leo Guthrie DO on April 10, 2017 8:41 AM         Electronically signed by Leo Guthrie DO at 4/10/2017  8:42 AM           Consult Notes (last 72 hours) (Notes from 4/7/2017  9:24 AM through 4/10/2017  9:24 AM)      Mariana Shannon MD at 4/10/2017  8:26 AM  Version 1 of 1     Consult Orders:    1. Inpatient Consult to Pulmonology [[id number]] ordered by Sandra Dominguez MD at 04/09/17 1827                CRITICAL CARE CONSULT NOTE  Mariana Shannon MD    [facility] CRITICAL CARE  4/10/2017        Rozina Mccartney  46 y.o. female  1970  [id number]            Requesting physician: Ozzy Angel MD    Reason for Consultation:  Acute respiratory failure secondary to overdose    CC: Unable to obtain    Subjective     History of Present Illness:  Rozina Mccartney is a 46 y.o. female with PMH significant for tobacco use, hypertension, depression and anxiety who was admitted yesterday after probable suicide attempt by overdose.  History is obtained from the chart as there is no family currently available.  Apparently, the patient called EMS stating that  she would take all of her medication and attempt to kill herself.  EMS did transfer the patient to the ED where she became progressively less responsive so she was intubated to protect her airway.  Initial postintubation chest x-ray showed left lung atelectasis likely due to right mainstem intubation, which improved once the ET tube was pulled back.  I was consult to assist with ventilator management.  Currently, the patient is sedated on propofol, but when the propofol was held she is able to follow commands.  She does have thick secretions from her ET tube and is on clindamycin empirically for aspiration.    Review of Systems:   Review of Systems   Unable to perform ROS: Intubated       All systems were reviewed and negative except as noted above in the HPI.    Home Meds:  Prescriptions Prior to Admission   Medication Sig Dispense Refill Last Dose   • albuterol (PROVENTIL HFA;VENTOLIN HFA) 108 (90 BASE) MCG/ACT inhaler Inhale 2 puffs every 4 (four) hours as needed for wheezing.   Taking   • amitriptyline (ELAVIL) 100 MG tablet Take  mg by mouth at night as needed for sleep.   Taking   • gabapentin (NEURONTIN) 600 MG tablet Take 600 mg by mouth 3 (Three) Times a Day.      • HYDROcodone-acetaminophen (NORCO) 5-325 MG per tablet TK 1 T PO Q 4 TO 6 HOURS PRN FOR PAIN  0 Not Taking   • lisinopril (PRINIVIL,ZESTRIL) 40 MG tablet Take 40 mg by mouth Daily.      • mirtazapine (REMERON) 15 MG tablet Take 15 mg by mouth Every Night.   Not Taking   • PARoxetine (PAXIL) 40 MG tablet Take 40 mg by mouth daily.   Taking       Inpatient Meds:    Current Facility-Administered Medications:   •  albuterol (PROVENTIL) nebulizer solution 0.083% 2.5 mg/3mL, 2.5 mg, Nebulization, Q4H - RT, Sandra Dominguez MD  •  clindamycin (CLEOCIN) injection 600 mg, 600 mg, Intramuscular, Q8H, Sandra Dominguez MD, 600 mg at 04/10/17 0631  •  dextrose 5 % and sodium chloride 0.45 % with KCl 20 mEq/L infusion, 100 mL/hr, Intravenous,  Continuous, Sandra Dominguez MD, Last Rate: 100 mL/hr at 17, 100 mL/hr at 17  •  enoxaparin (LOVENOX) syringe 40 mg, 40 mg, Subcutaneous, Daily, Sandra Dominguez MD  •  propofol (DIPRIVAN) infusion 10 mg/mL 100 mL, 5-50 mcg/kg/min, Intravenous, Titrated, Ozzy Angel MD, Last Rate: 17.15 mL/hr at 04/10/17 0414, 30 mcg/kg/min at 04/10/17 0414  •  sodium chloride 0.9 % bolus 500 mL, 500 mL, Intravenous, Once, Sandra Dominguez MD  •  sodium chloride 0.9 % flush 1-10 mL, 1-10 mL, Intravenous, PRN, Sandra Dominguez MD  •  sodium chloride 0.9 % flush 10 mL, 10 mL, Intravenous, PRN, Ozzy Angel MD    Allergies:  Allergies   Allergen Reactions   • Penicillins    • Seroquel [Quetiapine Fumarate]    • Stadol [Butorphanol] Other (See Comments)     *delusional   • Wellbutrin [Bupropion] Other (See Comments)     *aggitation       Past Medical History:  Past Medical History:   Diagnosis Date   • Anxiety state    • Bronchitis    • Chest pain    • Depressive disorder    • Essential hypertension    • Excessive or frequent menstruation    • Hyperlipidemia    • Intermenstrual bleeding     irregular - suspect endometrial versus endocervical polyp   • Osteoarthritis of multiple joints    • Surgery follow-up    • Tobacco dependence syndrome    • Viral hepatitis C    • Wheezing        Past Surgical History:  Past Surgical History:   Procedure Laterality Date   •  SECTION  2005    x2   • DENTAL PROCEDURE  1992    wisdom teeth   • DILATATION AND CURETTAGE      x2   • HYSTEROSCOPY     • INCISION AND DRAINAGE ABSCESS  2013    abscess of hand, septic arthritis, metatarsophalangeal joint, right hand   • INJECTION OF MEDICATION  2016    kenalog   • NECK SURGERY  1994 &    • SUPRACERVICAL HYSTERECTOMY SALPINGO OOPHORECTOMY Bilateral 2013    exam under anesthesia and supracervical hysterectomy with bilateral salpingo-ooporectomy. menometrorrhagia with stage 4  endometriosis   • TONSILLECTOMY  1978        Social History:   Social History     Social History   • Marital status:      Spouse name: N/A   • Number of children: N/A   • Years of education: N/A     Occupational History   • Not on file.     Social History Main Topics   • Smoking status: Current Every Day Smoker     Packs/day: 0.50     Years: 15.00   • Smokeless tobacco: Never Used      Comment: previous admission infromation, patient intubated unable to obtain infroomation at this tiime.    • Alcohol use Not on file      Comment: unable to assess, patient intubated at this time.    • Drug use: Not on file      Comment: patient intubated at this time.   • Sexual activity: Not on file      Comment: patient intubated unable to obtain infroamtion at this time.      Other Topics Concern   • Not on file     Social History Narrative       Family History:  History reviewed. No pertinent family history.    Objective     Vital Sign Min/Max for last 24 hours:  Temp  Min: 97.5 °F (36.4 °C)  Max: 98.2 °F (36.8 °C)   BP  Min: 91/55  Max: 184/85   Pulse  Min: 64  Max: 91   Resp  Min: 12  Max: 20   SpO2  Min: 61 %  Max: 100 %   Flow (L/min)  Min: 3  Max: 3   Weight  Min: 210 lb 1.6 oz (95.3 kg)  Max: 250 lb (113 kg)     Physical Exam:  98.2 °F (36.8 °C) (Oral) 86 128/74 17 96% 210 lb 1.6 oz (95.3 kg) Body mass index is 36.06 kg/(m^2).  Physical Exam   Constitutional: Vital signs are normal. She appears well-developed and well-nourished. She is sedated and intubated.   Obese   HENT:   Head: Normocephalic and atraumatic.   Nose: Nose normal.   Mouth/Throat: Oropharynx is clear and moist and mucous membranes are normal.   ETT, OGT   Eyes: Conjunctivae, EOM and lids are normal.   Neck: Trachea normal. Neck supple. No thyroid mass present.   Cardiovascular: Normal rate, regular rhythm and normal heart sounds.  Exam reveals no gallop.    No murmur heard.  Pulmonary/Chest: Effort normal. She is intubated. No respiratory distress.  She has no wheezes. She has rhonchi (with cough). She has no rales.   Abdominal: Soft. Normal appearance and bowel sounds are normal. There is no tenderness.   Obese       Vascular Status -  Her exam exhibits no right foot edema. Her exam exhibits no left foot edema.  Lymphadenopathy:        Head (right side): No submandibular adenopathy present.        Head (left side): No submandibular adenopathy present.     She has no cervical adenopathy.        Right: No supraclavicular adenopathy present.        Left: No supraclavicular adenopathy present.   Neurological:   Arouses all sedation and able to follow commands   Skin: Skin is warm and dry. No cyanosis. Nails show no clubbing.   Psychiatric: Her mood appears anxious.       Central Lines/PICC: absent    Data Review-   Labs: I personally reviewed the latest laboratory results.  Lab Results (last 24 hours)     Procedure Component Value Units Date/Time    CBC & Differential [[id number]] Collected:  04/09/17 1603    Specimen:  Blood Updated:  04/09/17 1612    Narrative:       The following orders were created for panel order CBC & Differential.  Procedure                               Abnormality         Status                     ---------                               -----------         ------                     CBC Auto Differential[[id number]]         Normal              Final result                 Please view results for these tests on the individual orders.    CBC Auto Differential [[id number]]  (Normal) Collected:  04/09/17 1603    Specimen:  Blood Updated:  04/09/17 1612     WBC 9.61 10*3/mm3      RBC 4.30 10*6/mm3      Hemoglobin 13.2 g/dL      Hematocrit 38.9 %      MCV 90.5 fL      MCH 30.7 pg      MCHC 33.9 g/dL      RDW 13.7 %      RDW-SD 45.3 fl      MPV 9.9 fL      Platelets 231 10*3/mm3      Neutrophil % 67.9 %      Lymphocyte % 21.1 %      Monocyte % 8.3 %      Eosinophil % 2.1 %      Basophil % 0.4 %      Immature Grans % 0.2 %      Neutrophils, Absolute  6.52 10*3/mm3      Lymphocytes, Absolute 2.03 10*3/mm3      Monocytes, Absolute 0.80 10*3/mm3      Eosinophils, Absolute 0.20 10*3/mm3      Basophils, Absolute 0.04 10*3/mm3      Immature Grans, Absolute 0.02 10*3/mm3     hCG, Serum, Qualitative [[id number]]  (Normal) Collected:  04/09/17 1603    Specimen:  Blood Updated:  04/09/17 1620     HCG Qualitative Negative    Comprehensive Metabolic Panel [[id number]]  (Abnormal) Collected:  04/09/17 1603    Specimen:  Blood Updated:  04/09/17 1622     Glucose 95 mg/dL      BUN 14 mg/dL      Creatinine 0.76 mg/dL      Sodium 139 mmol/L      Potassium 3.6 mmol/L      Chloride 106 mmol/L      CO2 21.0 (L) mmol/L      Calcium 9.2 mg/dL      Total Protein 7.0 g/dL      Albumin 3.90 g/dL      ALT (SGPT) 56 (H) U/L      AST (SGOT) 53 (H) U/L      Alkaline Phosphatase 101 U/L      Total Bilirubin 1.2 mg/dL      eGFR Non African Amer 83 mL/min/1.73      Globulin 3.1 gm/dL      A/G Ratio 1.3 g/dL      BUN/Creatinine Ratio 18.4     Anion Gap 12.0 mmol/L     Salicylate Level [[id number]]  (Abnormal) Collected:  04/09/17 1603    Specimen:  Blood Updated:  04/09/17 1622     Salicylate <1.0 (L) mg/dL     Acetaminophen Level [[id number]]  (Abnormal) Collected:  04/09/17 1603    Specimen:  Blood Updated:  04/09/17 1625     Acetaminophen <10.0 (L) mcg/mL     Protime-INR [[id number]]  (Normal) Collected:  04/09/17 1603    Specimen:  Blood Updated:  04/09/17 1635     Protime 13.5 Seconds      INR 1.04    Narrative:       Therapeutic range for most indications is 2.0-3.0 INR,  or 2.5-3.5 for mechanical heart valves.    Ethanol [[id number]] Collected:  04/09/17 1603    Specimen:  Blood Updated:  04/09/17 1640     Ethanol <10 mg/dL      Ethanol % <0.010 %     TSH [[id number]]  (Normal) Collected:  04/09/17 1603    Specimen:  Blood Updated:  04/09/17 1653     TSH 1.150 mIU/mL     POC Glucose Fingerstick [[id number]]  (Normal) Collected:  04/09/17 1656    Specimen:  Blood Updated:  04/09/17 1656     Glucose 106  mg/dL     Blood Gas, Arterial [[id number]]  (Abnormal) Collected:  04/09/17 1717    Specimen:  Arterial Blood Updated:  04/09/17 1717     Site --      Not performed at this site.        Gary's Test --      Not performed at this site.        pH, Arterial 7.292 (L) pH units      pCO2, Arterial 47.6 (H) mm Hg      pO2, Arterial 148.4 (H) mm Hg      HCO3, Arterial 22.5 mmol/L      Base Excess, Arterial -4.2 (L) mmol/L      O2 Saturation, Arterial 98.9 %      Hemoglobin, Blood Gas 13.4 g/dL      Hematocrit, Blood Gas 39.0 %      CO2 Content 23.9     Sodium, Arterial 144.6 mmol/L      Potassium, Arterial 3.55 (L) mmol/L      Glucose, Arterial 99 mmol/L      Barometric Pressure for Blood Gas -- mmHg       Not performed at this site.        Modality --      Not performed at this site.        Ionized Calcium 4.9 mg/dL     Urine Drug Screen [[id number]]  (Abnormal) Collected:  04/09/17 1701    Specimen:  Urine from Urine, Clean Catch Updated:  04/09/17 1748     Amphetamine Screen, Urine Positive (A)     Barbiturates Screen, Urine Negative     Benzodiazepine Screen, Urine Negative     Cocaine Screen, Urine Negative     Methadone Screen, Urine Negative     Opiate Screen Negative     Oxycodone Screen, Urine Negative     THC, Screen, Urine Negative    Narrative:       Negative Thresholds For Drugs Screened in Urine:     Amphetamines          500 ng/ml  Barbiturates          200 ng/ml  Benzodiazepines       200 ng/ml  Cocaine               150 ng/ml  Methadone             300 ng/mL  Opiates               300 ng/mL  Oxycodone             100 ng/mL  THC                   20 ng/mL    The normal value for all drugs tested is negative. This report includes final unconfirmed screening results.  A positive result by this assay can be, at your request, sent to the Reference Lab for confirmation by gas chromatography. Unconfirmed results must not be used for non-medical purposes, such as employment or legal testing. Clinical consideration  should be applied to any drug of abuse test result, particularly when unconfirmed results are used.    POC Glucose Fingerstick [[id number]]  (Normal) Collected:  04/09/17 1655    Specimen:  Blood Updated:  04/09/17 1808     Glucose 106 mg/dL       Meter: [id number]: [id number] ALONDRA MANCIA       [place] Draw [[id number]] Collected:  04/09/17 1603    Specimen:  Blood Updated:  04/09/17 2101    Narrative:       The following orders were created for panel order [place] Draw.  Procedure                               Abnormality         Status                     ---------                               -----------         ------                     Light Blue Top[[id number]]                                    Final result               Green Top (Gel)[[id number]]                                   Final result               Lavender Top[[id number]]                                      Final result               Gold Top - SST[[id number]]                                    Final result                 Please view results for these tests on the individual orders.    Light Blue Top [[id number]] Collected:  04/09/17 1603    Specimen:  Blood Updated:  04/09/17 2101     Extra Tube hold for add-on      Auto resulted       Green Top (Gel) [[id number]] Collected:  04/09/17 1603    Specimen:  Blood Updated:  04/09/17 2101     Extra Tube Hold for add-ons.      Auto resulted.       Lavender Top [[id number]] Collected:  04/09/17 1603    Specimen:  Blood Updated:  04/09/17 2101     Extra Tube hold for add-on      Auto resulted       Gold Top - SST [[id number]] Collected:  04/09/17 1603    Specimen:  Blood Updated:  04/09/17 2101     Extra Tube Hold for add-ons.      Auto resulted.       Blood Culture [[id number]] Collected:  04/09/17 2112    Specimen:  Blood from Wrist, Left Updated:  04/09/17 2116    Blood Culture [[id number]] Collected:  04/09/17 2102    Specimen:  Blood from Arm, Left Updated:  04/09/17 2116    Lactic Acid, Plasma [[id number]]   (Normal) Collected:  04/09/17 2112    Specimen:  Blood Updated:  04/09/17 2132     Lactate 0.5 mmol/L     Troponin [[id number]]  (Normal) Collected:  04/09/17 2112    Specimen:  Blood Updated:  04/09/17 2242     Troponin I <0.012 ng/mL     CBC & Differential [[id number]] Collected:  04/10/17 0400    Specimen:  Blood Updated:  04/10/17 0409    Narrative:       The following orders were created for panel order CBC & Differential.  Procedure                               Abnormality         Status                     ---------                               -----------         ------                     CBC Auto Differential[[id number]]         Abnormal            Final result                 Please view results for these tests on the individual orders.    CBC Auto Differential [[id number]]  (Abnormal) Collected:  04/10/17 0400    Specimen:  Blood Updated:  04/10/17 0409     WBC 11.00 (H) 10*3/mm3      RBC 4.54 10*6/mm3      Hemoglobin 14.0 g/dL      Hematocrit 41.8 %      MCV 92.1 fL      MCH 30.8 pg      MCHC 33.5 g/dL      RDW 14.0 %      RDW-SD 47.6 (H) fl      MPV 9.8 fL      Platelets 189 10*3/mm3      Neutrophil % 75.9 %      Lymphocyte % 14.3 %      Monocyte % 7.0 %      Eosinophil % 2.0 %      Basophil % 0.5 %      Immature Grans % 0.3 %      Neutrophils, Absolute 8.36 10*3/mm3      Lymphocytes, Absolute 1.57 10*3/mm3      Monocytes, Absolute 0.77 10*3/mm3      Eosinophils, Absolute 0.22 10*3/mm3      Basophils, Absolute 0.05 10*3/mm3      Immature Grans, Absolute 0.03 (H) 10*3/mm3     Comprehensive Metabolic Panel [[id number]]  (Abnormal) Collected:  04/10/17 0400    Specimen:  Blood Updated:  04/10/17 0422     Glucose 97 mg/dL      BUN 11 mg/dL      Creatinine 0.80 mg/dL      Sodium 145 mmol/L      Potassium 3.6 mmol/L      Chloride 104 mmol/L      CO2 27.0 mmol/L      Calcium 8.7 mg/dL      Total Protein 7.1 g/dL      Albumin 3.90 g/dL      ALT (SGPT) 61 (H) U/L      AST (SGOT) 62 (H) U/L      Alkaline Phosphatase 97  U/L      Total Bilirubin 0.9 mg/dL      eGFR Non African Amer 78 mL/min/1.73      Globulin 3.2 gm/dL      A/G Ratio 1.2 g/dL      BUN/Creatinine Ratio 13.8     Anion Gap 14.0 mmol/L     Troponin [[id number]]  (Normal) Collected:  04/10/17 0400    Specimen:  Blood Updated:  04/10/17 0428     Troponin I <0.012 ng/mL     Blood Gas, Arterial [[id number]]  (Abnormal) Collected:  04/10/17 0456    Specimen:  Arterial Blood Updated:  04/10/17 0516     Site --      Not performed at this site.        Gary's Test --      Not performed at this site.        pH, Arterial 7.466 (H) pH units      pCO2, Arterial 35.8 mm Hg      pO2, Arterial 71.8 (L) mm Hg      HCO3, Arterial 25.2 mmol/L      Base Excess, Arterial 1.8 mmol/L      O2 Saturation, Arterial 95.7 %      Hemoglobin, Blood Gas 14.4 g/dL      Hematocrit, Blood Gas 42.0 %      CO2 Content 26.3     Sodium, Arterial 142.3 mmol/L      Potassium, Arterial 3.47 (L) mmol/L      Glucose, Arterial 108 mmol/L      Barometric Pressure for Blood Gas -- mmHg       Not performed at this site.        Modality --      Not performed at this site.        Ionized Calcium 4.7 mg/dL     Respiratory Culture [[id number]] Collected:  04/09/17 1638    Specimen:  Sputum from ET Suction Updated:  04/10/17 0619     Respiratory Culture Normal Respiratory Donny     Gram Stain Result Many (4+) WBCs per low power field      Rare (1+) Epithelial cells per low power field      Mixed bacterial donny           Imaging: I personally visualized the relevant images of scans/x-rays performed.  Imaging Results (last 24 hours)     Procedure Component Value Units Date/Time    XR Chest 1 View [[id number]] Collected:  04/09/17 1728     Updated:  04/09/17 1733    Narrative:       Patient Name:  MS. ISAEL FELDER  Patient ID:  [id number]   Ordering:  DERRICK MAI  Attending:  DERRICK MAI  Referring:  DERRICK MAI  ------------------------------------------------    PORTABLE CHEST    HISTORY:  Intubation    Portable AP supine film of the chest was obtained at 4:57 PM.  COMPARISON: October 2, 2016    EKG leads present.  Endotracheal tube in place with tip in the right mainstem  bronchus.  Associated opacification of the left hemithorax compatible with  atelectasis of the entire left lung.  Recommend withdrawing endotracheal tube 4.5 cm or more.  Discoid atelectasis right upper lobe.  Nasogastric tube in place with tip in the fundus of the stomach  directed cephalad and to the left.  Heart size difficult to evaluate.  The pulmonary vasculature is not increased.  No pleural effusion.  No pneumothorax.  No acute osseous abnormality.      Impression:       CONCLUSION:  Endotracheal tube in place with tip in the right mainstem  bronchus.  Associated opacification of the left hemithorax compatible with  atelectasis of the entire left lung.  Recommend withdrawing endotracheal tube 4.5 cm or more.  Discoid atelectasis right upper lobe.  Nasogastric tube in place with tip in the fundus of the stomach  directed cephalad and to the left.    Report called at approximately 3:40 PM CST.    [id number]    Electronically signed by:  Maikel Shannon MD  4/9/2017 5:32 PM CDT  Workstation: [facility] Chest 1 View [[id number]] Collected:  04/09/17 1837     Updated:  04/09/17 1840    Narrative:       Patient Name:  MS. ISAEL FELDER  Patient ID:  [id number]   Ordering:  DEMOND SOLORIO  Attending:  DERRICK MAI  Referring:  DEMOND SOLORIO  ------------------------------------------------    PORTABLE CHEST    HISTORY: Position of endotracheal tube    Portable AP supine film of the chest was obtained at 5:54 PM.  COMPARISON: For 50 7:00 PM    Endotracheal tube now 3 cm above the anna marie.  Significant improvement in aeration of the left lung.  Nasogastric tube remains in place.  The heart is not enlarged.  The pulmonary vasculature is not increased.  No pleural effusion.  No pneumothorax.  No acute osseous abnormality.       Impression:       CONCLUSION:  Endotracheal tube now 3 cm above the anna marie.  Significant improvement in aeration of the left lung.  Nasogastric tube remains in place.    [id number]    Electronically signed by:  Maikel Shannon MD  4/9/2017 6:39 PM CDT  Workstation: [employer]    CT Chest Without Contrast [[id number]] Collected:  04/09/17 1857     Updated:  04/09/17 1906    Narrative:       Patient Name:  MS. ISAEL FELDER  Patient ID:  [id number]   Ordering:  DEMOND SOLORIO  Attending:  DERRICK MAI  Referring:  DEMOND SOLORIO  ------------------------------------------------    CT Chest Without intravenous Contrast    History: Whiteout left lung.    Axial spiral scans of the chest were obtained without intravenous  contrast.  Coronal and sagital reconstructions were preformed.    DLP: 565.30    Comparison: None. Correlation with earlier chest radiographs.    Endotracheal tube in place with tip well above the anna marie.  Consolidation and volume posterior segment right upper lobe.  Subsegmental atelectasis or infiltrate apical segment right upper  lobe.  Residual left lower lobe segmental atelectasis or infiltrate.  No mass or noncalcified nodule.  No pleural effusion.  No pneumothorax.    No hilar, mediastinal or axillary adenopathy.  No thoracic aortic aneurysm.  No pericardial effusion.    Nasogastric tube in place with tip laterally in the proximal  stomach.  The tip somewhat deforms or pushes the lateral wall the stomach  outward.    Old compression deformities thoracic spine with associated  accentuation of the dorsal kyphosis.  Degenerative changes in the thoracic spine.      Impression:       Conclusion:  Endotracheal tube in place with tip well above the anna marie.  Consolidation and volume posterior segment right upper lobe.  Subsegmental atelectasis or infiltrate apical segment right upper  lobe.  Residual left lower lobe segmental atelectasis or infiltrate.  Nasogastric tube in place with tip laterally  in the proximal  stomach.  The tip somewhat deforms or pushes the lateral wall the stomach  outward.    [id number]    Electronically signed by:  Maikel Shannon MD  4/9/2017 7:05 PM CDT  Workstation: [employer]    XR Chest 1 View [[id number]] Collected:  04/10/17 0633     Updated:  04/10/17 0636    Narrative:       Exam: AP portable chest.    INDICATION: Intubated    COMPARISON: 4/9/2017    FINDINGS: Endotracheal tube tip is 2.7 cm above the anna marie. NG  tube extends into the stomach. Heart size is normal. Perhaps  slight decrease in the right upper lobe consolidation. There is a  no significant interval change in the left lower lobe atelectasis  and/or consolidation.      Impression:       Perhaps slight improvement in aeration in the right  upper lung    Electronically signed by:  Daren Cedeno MD  4/10/2017 6:35 AM  CDT Workstation: [id number]            Assessment/Plan     Assessment:  # Intentional overdose- elavil and gabapentin  # Acute hypoxic respiratory failure due to above  # Probable aspiration pneumonitis  # Anxiety/depression  # Hypertension  # Hepatitis C      Recommendations:  -Change to PSV 12/5 with good compliance  -Wean sedation off. Once awake and following commands, would extubate as she has a good cough and will be able to protect her airway  -Sputum culture  -Empiric clindamycin  -Psych consult once extubated  -Bedside swallow eval once extubated  -PPX:  Lovenox    D/w RN and Dr. Dominguez    Critical care time spent: 47 minutes  This time excludes other billable procedures. Time does include preparation of documents, medical consultations, review of old records, and direct bedside care.       Thank you for allowing me to participate in the care of Rozina Mccartney. Please do not hesitate to contact me with any questions.       This document has been electronically signed by Mariana Shannon MD on April 10, 2017 8:26 AM      [phone]    EMR Dragon/Transcription disclaimer:     Much of this  encounter note is an electronic transcription/translation of spoken language to printed text. The electronic translation of spoken language may permit erroneous, or at times, nonsensical words or phrases to be inadvertently transcribed; Although I have reviewed the note for such errors, some may still exist.          Electronically signed by Mariana Shannon MD at 4/10/2017  8:34 AM      Shaina Carrillo RN [facility] provider id#[id number]  [phone]  Fax [phone]  Inpatient ICU admission  4/9/17

## 2017-04-10 NOTE — H&P
Patient Care Team:  No Known Provider as PCP - General    Chief complaint 46 year old white female with suicidal attempt with TCA and Neurontin. Intubated in ED after worsening somnolence.    Subjective     Drug Overdose   This is a new problem. The current episode started today. The symptoms are aggravated by stress.   Suicidal   This is a new problem. The current episode started today.       Review of Systems   Unable to perform ROS: Intubated        Past Medical History:   Diagnosis Date   • Anxiety state    • Bronchitis    • Chest pain    • Depressive disorder    • Essential hypertension    • Excessive or frequent menstruation    • Hyperlipidemia    • Intermenstrual bleeding     irregular - suspect endometrial versus endocervical polyp   • Osteoarthritis of multiple joints    • Surgery follow-up    • Tobacco dependence syndrome    • Viral hepatitis C    • Wheezing      Past Surgical History:   Procedure Laterality Date   •  SECTION  2005    x2   • DENTAL PROCEDURE  1992    wisdom teeth   • DILATATION AND CURETTAGE      x2   • HYSTEROSCOPY     • INCISION AND DRAINAGE ABSCESS  2013    abscess of hand, septic arthritis, metatarsophalangeal joint, right hand   • INJECTION OF MEDICATION  2016    kenalog   • NECK SURGERY  1994 &    • SUPRACERVICAL HYSTERECTOMY SALPINGO OOPHORECTOMY Bilateral 2013    exam under anesthesia and supracervical hysterectomy with bilateral salpingo-ooporectomy. menometrorrhagia with stage 4 endometriosis   • TONSILLECTOMY  1978     History reviewed. No pertinent family history.  Social History   Substance Use Topics   • Smoking status: Current Every Day Smoker     Packs/day: 0.50     Years: 15.00   • Smokeless tobacco: Never Used      Comment: previous admission infromation, patient intubated unable to obtain infroomation at this tiime.    • Alcohol use None      Comment: unable to assess, patient intubated at this time.      Prescriptions Prior to  Admission   Medication Sig Dispense Refill Last Dose   • albuterol (PROVENTIL HFA;VENTOLIN HFA) 108 (90 BASE) MCG/ACT inhaler Inhale 2 puffs every 4 (four) hours as needed for wheezing.   Taking   • amitriptyline (ELAVIL) 100 MG tablet Take  mg by mouth at night as needed for sleep.   Taking   • gabapentin (NEURONTIN) 600 MG tablet Take 600 mg by mouth 3 (Three) Times a Day.      • HYDROcodone-acetaminophen (NORCO) 5-325 MG per tablet TK 1 T PO Q 4 TO 6 HOURS PRN FOR PAIN  0 Not Taking   • lisinopril (PRINIVIL,ZESTRIL) 40 MG tablet Take 40 mg by mouth Daily.      • mirtazapine (REMERON) 15 MG tablet Take 15 mg by mouth Every Night.   Not Taking   • PARoxetine (PAXIL) 40 MG tablet Take 40 mg by mouth daily.   Taking     Allergies:  Penicillins; Seroquel [quetiapine fumarate]; Stadol [butorphanol]; and Wellbutrin [bupropion]    Objective      Vital Signs  Temp:  [97.5 °F (36.4 °C)-97.7 °F (36.5 °C)] 97.7 °F (36.5 °C)  Heart Rate:  [64-84] 67  Resp:  [12-16] 12  BP: ()/() 106/73  FiO2 (%):  [70 %-100 %] 70 %    Physical Exam   Constitutional: She appears well-developed and well-nourished. She is intubated.   HENT:   Head: Normocephalic and atraumatic.   Eyes: Conjunctivae are normal.   Neck: Normal range of motion. No tracheal deviation present.   Cardiovascular: Normal rate, regular rhythm and normal heart sounds.  Exam reveals no gallop and no friction rub.    No murmur heard.  Pulmonary/Chest: She is intubated. She is in respiratory distress. She has decreased breath sounds in the left upper field, the left middle field and the left lower field. She has no wheezes. She has no rhonchi. She has no rales.   Lymphadenopathy:     She has no cervical adenopathy.   Nursing note and vitals reviewed.      Results Review:   I reviewed the patient's new clinical results.  I reviewed the patient's new imaging results and agree with the interpretation.  I reviewed the patient's other test results and agree with  the interpretation  I personally viewed and interpreted the patient's EKG/Telemetry data    Patient Name: MS. ISAEL FELDER  Patient ID: [id number]   Ordering: DEMOND SOLORIO  Attending: DERRICK MAI  Referring: DEMOND SOLORIO  ------------------------------------------------     PORTABLE CHEST     HISTORY: Position of endotracheal tube     Portable AP supine film of the chest was obtained at 5:54 PM.  COMPARISON: For 50 7:00 PM     Endotracheal tube now 3 cm above the anna marie.  Significant improvement in aeration of the left lung.  Nasogastric tube remains in place.  The heart is not enlarged.  The pulmonary vasculature is not increased.  No pleural effusion.  No pneumothorax.  No acute osseous abnormality.     IMPRESSION:  CONCLUSION:  Endotracheal tube now 3 cm above the anna marie.  Significant improvement in aeration of the left lung.  Nasogastric tube remains in place.     Lab Results   Component Value Date    WBC 9.61 04/09/2017    HGB 13.2 04/09/2017    HCT 38.9 04/09/2017    MCV 90.5 04/09/2017     04/09/2017     Lab Results   Component Value Date    GLUCOSE 99 04/09/2017    BUN 14 04/09/2017    CREATININE 0.76 04/09/2017    EGFRIFNONA 83 04/09/2017    BCR 18.4 04/09/2017    K 3.6 04/09/2017    CO2 21.0 (L) 04/09/2017    CALCIUM 9.2 04/09/2017    ALBUMIN 3.90 04/09/2017    LABIL2 1.3 04/09/2017    AST 53 (H) 04/09/2017    ALT 56 (H) 04/09/2017       Assessment/Plan     Active Problems:    Overdose    Suicidal overdose    Atelectasis of left lung    Obesity due to excess calories    Chronic hepatitis C virus infection      Assessment & Plan     Depression  Suicidal attempt  Overdose  Respiratory failure with intubation and mechanical ventilation  Depression  Left lung atelectasis/aspiration    Support medically  Consult pulmonary with management of ventilator  Will consult  post extubation and when patient cooperative    Leo Guthrie,   04/09/17  7:39 PM    I have examined the patient  and reviewed the pertinent diagnostic data. I have discussed the case with the Family Medicine Resident and agree with the assessment and plan of care.    Leo Guthrie DO           This document has been electronically signed by Leo Guthrie DO on April 9, 2017 7:49 PM

## 2017-04-10 NOTE — PLAN OF CARE
Problem: Patient Care Overview (Adult)  Goal: Plan of Care Review  Outcome: Ongoing (interventions implemented as appropriate)    04/10/17 0615   Coping/Psychosocial Response Interventions   Plan Of Care Reviewed With other (see comments)  (patient sedated and intubated at this time. )   Patient Care Overview   Progress no change       Goal: Adult Individualization and Mutuality  Outcome: Ongoing (interventions implemented as appropriate)  Goal: Discharge Needs Assessment  Outcome: Ongoing (interventions implemented as appropriate)    Problem: SAFETY - NON-VIOLENT RESTRAINT  Goal: Remains free of injury from restraints (Non-Violent Restraint)  Outcome: Ongoing (interventions implemented as appropriate)  Goal: Free from restraint(s) (Non-Violent Restraint)  Outcome: Ongoing (interventions implemented as appropriate)    Problem: Mechanical Ventilation, Invasive (Adult)  Goal: Signs and Symptoms of Listed Potential Problems Will be Absent or Manageable (Mechanical Ventilation, Invasive)  Outcome: Ongoing (interventions implemented as appropriate)    Problem: Airway, Artificial (Adult)  Goal: Signs and Symptoms of Listed Potential Problems Will be Absent or Manageable (Airway, Artificial)  Outcome: Ongoing (interventions implemented as appropriate)    Problem: Fall Risk (Adult)  Goal: Identify Related Risk Factors and Signs and Symptoms  Outcome: Ongoing (interventions implemented as appropriate)  Goal: Absence of Falls  Outcome: Ongoing (interventions implemented as appropriate)

## 2017-04-10 NOTE — CONSULTS
CRITICAL CARE CONSULT NOTE  Mariana Shannon MD    [facility] CRITICAL CARE  4/10/2017        Rozina Mccartney  46 y.o. female  1970  [id number]            Requesting physician: Ozzy Angel MD    Reason for Consultation:  Acute respiratory failure secondary to overdose    CC: Unable to obtain    Subjective     History of Present Illness:  Rozina Mccartney is a 46 y.o. female with PMH significant for tobacco use, hypertension, depression and anxiety who was admitted yesterday after probable suicide attempt by overdose.  History is obtained from the chart as there is no family currently available.  Apparently, the patient called EMS stating that she would take all of her medication and attempt to kill herself.  EMS did transfer the patient to the ED where she became progressively less responsive so she was intubated to protect her airway.  Initial postintubation chest x-ray showed left lung atelectasis likely due to right mainstem intubation, which improved once the ET tube was pulled back.  I was consult to assist with ventilator management.  Currently, the patient is sedated on propofol, but when the propofol was held she is able to follow commands.  She does have thick secretions from her ET tube and is on clindamycin empirically for aspiration.    Review of Systems:   Review of Systems   Unable to perform ROS: Intubated       All systems were reviewed and negative except as noted above in the HPI.    Home Meds:  Prescriptions Prior to Admission   Medication Sig Dispense Refill Last Dose   • albuterol (PROVENTIL HFA;VENTOLIN HFA) 108 (90 BASE) MCG/ACT inhaler Inhale 2 puffs every 4 (four) hours as needed for wheezing.   Taking   • amitriptyline (ELAVIL) 100 MG tablet Take  mg by mouth at night as needed for sleep.   Taking   • gabapentin (NEURONTIN) 600 MG tablet Take 600 mg by mouth 3 (Three) Times a Day.      • HYDROcodone-acetaminophen (NORCO) 5-325 MG per tablet TK 1 T PO Q  4 TO 6 HOURS PRN FOR PAIN  0 Not Taking   • lisinopril (PRINIVIL,ZESTRIL) 40 MG tablet Take 40 mg by mouth Daily.      • mirtazapine (REMERON) 15 MG tablet Take 15 mg by mouth Every Night.   Not Taking   • PARoxetine (PAXIL) 40 MG tablet Take 40 mg by mouth daily.   Taking       Inpatient Meds:    Current Facility-Administered Medications:   •  albuterol (PROVENTIL) nebulizer solution 0.083% 2.5 mg/3mL, 2.5 mg, Nebulization, Q4H - RT, Sandra Dominguez MD  •  clindamycin (CLEOCIN) injection 600 mg, 600 mg, Intramuscular, Q8H, Sandra Dominguez MD, 600 mg at 04/10/17 0631  •  dextrose 5 % and sodium chloride 0.45 % with KCl 20 mEq/L infusion, 100 mL/hr, Intravenous, Continuous, Sandra Dominguez MD, Last Rate: 100 mL/hr at 04/09/17 2232, 100 mL/hr at 04/09/17 2232  •  enoxaparin (LOVENOX) syringe 40 mg, 40 mg, Subcutaneous, Daily, Sandra Dominguez MD  •  propofol (DIPRIVAN) infusion 10 mg/mL 100 mL, 5-50 mcg/kg/min, Intravenous, Titrated, Ozzy Angel MD, Last Rate: 17.15 mL/hr at 04/10/17 0414, 30 mcg/kg/min at 04/10/17 0414  •  sodium chloride 0.9 % bolus 500 mL, 500 mL, Intravenous, Once, Sandra Dominguez MD  •  sodium chloride 0.9 % flush 1-10 mL, 1-10 mL, Intravenous, PRN, Sandra Dominguez MD  •  sodium chloride 0.9 % flush 10 mL, 10 mL, Intravenous, PRN, Ozzy Angel MD    Allergies:  Allergies   Allergen Reactions   • Penicillins    • Seroquel [Quetiapine Fumarate]    • Stadol [Butorphanol] Other (See Comments)     *delusional   • Wellbutrin [Bupropion] Other (See Comments)     *aggitation       Past Medical History:  Past Medical History:   Diagnosis Date   • Anxiety state    • Bronchitis    • Chest pain    • Depressive disorder    • Essential hypertension    • Excessive or frequent menstruation    • Hyperlipidemia    • Intermenstrual bleeding     irregular - suspect endometrial versus endocervical polyp   • Osteoarthritis of multiple joints    • Surgery follow-up    • Tobacco  dependence syndrome    • Viral hepatitis C    • Wheezing        Past Surgical History:  Past Surgical History:   Procedure Laterality Date   •  SECTION  2005    x2   • DENTAL PROCEDURE  1992    wisdom teeth   • DILATATION AND CURETTAGE      x2   • HYSTEROSCOPY     • INCISION AND DRAINAGE ABSCESS  2013    abscess of hand, septic arthritis, metatarsophalangeal joint, right hand   • INJECTION OF MEDICATION  2016    kenalog   • NECK SURGERY  1994 &    • SUPRACERVICAL HYSTERECTOMY SALPINGO OOPHORECTOMY Bilateral 2013    exam under anesthesia and supracervical hysterectomy with bilateral salpingo-ooporectomy. menometrorrhagia with stage 4 endometriosis   • TONSILLECTOMY          Social History:   Social History     Social History   • Marital status:      Spouse name: N/A   • Number of children: N/A   • Years of education: N/A     Occupational History   • Not on file.     Social History Main Topics   • Smoking status: Current Every Day Smoker     Packs/day: 0.50     Years: 15.00   • Smokeless tobacco: Never Used      Comment: previous admission infromation, patient intubated unable to obtain infroomation at this tiime.    • Alcohol use Not on file      Comment: unable to assess, patient intubated at this time.    • Drug use: Not on file      Comment: patient intubated at this time.   • Sexual activity: Not on file      Comment: patient intubated unable to obtain infroamtion at this time.      Other Topics Concern   • Not on file     Social History Narrative       Family History:  History reviewed. No pertinent family history.    Objective     Vital Sign Min/Max for last 24 hours:  Temp  Min: 97.5 °F (36.4 °C)  Max: 98.2 °F (36.8 °C)   BP  Min: 91/55  Max: 184/85   Pulse  Min: 64  Max: 91   Resp  Min: 12  Max: 20   SpO2  Min: 61 %  Max: 100 %   Flow (L/min)  Min: 3  Max: 3   Weight  Min: 210 lb 1.6 oz (95.3 kg)  Max: 250 lb (113 kg)     Physical Exam:  98.2 °F (36.8 °C)  (Oral) 86 128/74 17 96% 210 lb 1.6 oz (95.3 kg) Body mass index is 36.06 kg/(m^2).  Physical Exam   Constitutional: Vital signs are normal. She appears well-developed and well-nourished. She is sedated and intubated.   Obese   HENT:   Head: Normocephalic and atraumatic.   Nose: Nose normal.   Mouth/Throat: Oropharynx is clear and moist and mucous membranes are normal.   ETT, OGT   Eyes: Conjunctivae, EOM and lids are normal.   Neck: Trachea normal. Neck supple. No thyroid mass present.   Cardiovascular: Normal rate, regular rhythm and normal heart sounds.  Exam reveals no gallop.    No murmur heard.  Pulmonary/Chest: Effort normal. She is intubated. No respiratory distress. She has no wheezes. She has rhonchi (with cough). She has no rales.   Abdominal: Soft. Normal appearance and bowel sounds are normal. There is no tenderness.   Obese       Vascular Status -  Her exam exhibits no right foot edema. Her exam exhibits no left foot edema.  Lymphadenopathy:        Head (right side): No submandibular adenopathy present.        Head (left side): No submandibular adenopathy present.     She has no cervical adenopathy.        Right: No supraclavicular adenopathy present.        Left: No supraclavicular adenopathy present.   Neurological:   Arouses all sedation and able to follow commands   Skin: Skin is warm and dry. No cyanosis. Nails show no clubbing.   Psychiatric: Her mood appears anxious.       Central Lines/PICC: absent    Data Review-   Labs: I personally reviewed the latest laboratory results.  Lab Results (last 24 hours)     Procedure Component Value Units Date/Time    CBC & Differential [[id number]] Collected:  04/09/17 1603    Specimen:  Blood Updated:  04/09/17 1612    Narrative:       The following orders were created for panel order CBC & Differential.  Procedure                               Abnormality         Status                     ---------                               -----------         ------                      CBC Auto Differential[[id number]]         Normal              Final result                 Please view results for these tests on the individual orders.    CBC Auto Differential [[id number]]  (Normal) Collected:  04/09/17 1603    Specimen:  Blood Updated:  04/09/17 1612     WBC 9.61 10*3/mm3      RBC 4.30 10*6/mm3      Hemoglobin 13.2 g/dL      Hematocrit 38.9 %      MCV 90.5 fL      MCH 30.7 pg      MCHC 33.9 g/dL      RDW 13.7 %      RDW-SD 45.3 fl      MPV 9.9 fL      Platelets 231 10*3/mm3      Neutrophil % 67.9 %      Lymphocyte % 21.1 %      Monocyte % 8.3 %      Eosinophil % 2.1 %      Basophil % 0.4 %      Immature Grans % 0.2 %      Neutrophils, Absolute 6.52 10*3/mm3      Lymphocytes, Absolute 2.03 10*3/mm3      Monocytes, Absolute 0.80 10*3/mm3      Eosinophils, Absolute 0.20 10*3/mm3      Basophils, Absolute 0.04 10*3/mm3      Immature Grans, Absolute 0.02 10*3/mm3     hCG, Serum, Qualitative [[id number]]  (Normal) Collected:  04/09/17 1603    Specimen:  Blood Updated:  04/09/17 1620     HCG Qualitative Negative    Comprehensive Metabolic Panel [[id number]]  (Abnormal) Collected:  04/09/17 1603    Specimen:  Blood Updated:  04/09/17 1622     Glucose 95 mg/dL      BUN 14 mg/dL      Creatinine 0.76 mg/dL      Sodium 139 mmol/L      Potassium 3.6 mmol/L      Chloride 106 mmol/L      CO2 21.0 (L) mmol/L      Calcium 9.2 mg/dL      Total Protein 7.0 g/dL      Albumin 3.90 g/dL      ALT (SGPT) 56 (H) U/L      AST (SGOT) 53 (H) U/L      Alkaline Phosphatase 101 U/L      Total Bilirubin 1.2 mg/dL      eGFR Non African Amer 83 mL/min/1.73      Globulin 3.1 gm/dL      A/G Ratio 1.3 g/dL      BUN/Creatinine Ratio 18.4     Anion Gap 12.0 mmol/L     Salicylate Level [[id number]]  (Abnormal) Collected:  04/09/17 1603    Specimen:  Blood Updated:  04/09/17 1622     Salicylate <1.0 (L) mg/dL     Acetaminophen Level [[id number]]  (Abnormal) Collected:  04/09/17 1603    Specimen:  Blood Updated:  04/09/17 0470      Acetaminophen <10.0 (L) mcg/mL     Protime-INR [[id number]]  (Normal) Collected:  04/09/17 1603    Specimen:  Blood Updated:  04/09/17 1635     Protime 13.5 Seconds      INR 1.04    Narrative:       Therapeutic range for most indications is 2.0-3.0 INR,  or 2.5-3.5 for mechanical heart valves.    Ethanol [[id number]] Collected:  04/09/17 1603    Specimen:  Blood Updated:  04/09/17 1640     Ethanol <10 mg/dL      Ethanol % <0.010 %     TSH [[id number]]  (Normal) Collected:  04/09/17 1603    Specimen:  Blood Updated:  04/09/17 1653     TSH 1.150 mIU/mL     POC Glucose Fingerstick [[id number]]  (Normal) Collected:  04/09/17 1656    Specimen:  Blood Updated:  04/09/17 1656     Glucose 106 mg/dL     Blood Gas, Arterial [[id number]]  (Abnormal) Collected:  04/09/17 1717    Specimen:  Arterial Blood Updated:  04/09/17 1717     Site --      Not performed at this site.        Gary's Test --      Not performed at this site.        pH, Arterial 7.292 (L) pH units      pCO2, Arterial 47.6 (H) mm Hg      pO2, Arterial 148.4 (H) mm Hg      HCO3, Arterial 22.5 mmol/L      Base Excess, Arterial -4.2 (L) mmol/L      O2 Saturation, Arterial 98.9 %      Hemoglobin, Blood Gas 13.4 g/dL      Hematocrit, Blood Gas 39.0 %      CO2 Content 23.9     Sodium, Arterial 144.6 mmol/L      Potassium, Arterial 3.55 (L) mmol/L      Glucose, Arterial 99 mmol/L      Barometric Pressure for Blood Gas -- mmHg       Not performed at this site.        Modality --      Not performed at this site.        Ionized Calcium 4.9 mg/dL     Urine Drug Screen [[id number]]  (Abnormal) Collected:  04/09/17 1701    Specimen:  Urine from Urine, Clean Catch Updated:  04/09/17 1748     Amphetamine Screen, Urine Positive (A)     Barbiturates Screen, Urine Negative     Benzodiazepine Screen, Urine Negative     Cocaine Screen, Urine Negative     Methadone Screen, Urine Negative     Opiate Screen Negative     Oxycodone Screen, Urine Negative     THC, Screen, Urine Negative     Narrative:       Negative Thresholds For Drugs Screened in Urine:     Amphetamines          500 ng/ml  Barbiturates          200 ng/ml  Benzodiazepines       200 ng/ml  Cocaine               150 ng/ml  Methadone             300 ng/mL  Opiates               300 ng/mL  Oxycodone             100 ng/mL  THC                   20 ng/mL    The normal value for all drugs tested is negative. This report includes final unconfirmed screening results.  A positive result by this assay can be, at your request, sent to the Reference Lab for confirmation by gas chromatography. Unconfirmed results must not be used for non-medical purposes, such as employment or legal testing. Clinical consideration should be applied to any drug of abuse test result, particularly when unconfirmed results are used.    POC Glucose Fingerstick [[id number]]  (Normal) Collected:  04/09/17 1655    Specimen:  Blood Updated:  04/09/17 1808     Glucose 106 mg/dL       Meter: [id number]: [id number] ALONDRA MANCIA       [place] Draw [[id number]] Collected:  04/09/17 1603    Specimen:  Blood Updated:  04/09/17 2101    Narrative:       The following orders were created for panel order [place] Draw.  Procedure                               Abnormality         Status                     ---------                               -----------         ------                     Light Blue Top[[id number]]                                    Final result               Green Top (Gel)[[id number]]                                   Final result               Lavender Top[[id number]]                                      Final result               Gold Top - SST[[id number]]                                    Final result                 Please view results for these tests on the individual orders.    Light Blue Top [[id number]] Collected:  04/09/17 1603    Specimen:  Blood Updated:  04/09/17 2101     Extra Tube hold for add-on      Auto resulted       Green Top (Gel) [[id number]]  Collected:  04/09/17 1603    Specimen:  Blood Updated:  04/09/17 2101     Extra Tube Hold for add-ons.      Auto resulted.       Lavender Top [[id number]] Collected:  04/09/17 1603    Specimen:  Blood Updated:  04/09/17 2101     Extra Tube hold for add-on      Auto resulted       Gold Top - SST [[id number]] Collected:  04/09/17 1603    Specimen:  Blood Updated:  04/09/17 2101     Extra Tube Hold for add-ons.      Auto resulted.       Blood Culture [[id number]] Collected:  04/09/17 2112    Specimen:  Blood from Wrist, Left Updated:  04/09/17 2116    Blood Culture [[id number]] Collected:  04/09/17 2102    Specimen:  Blood from Arm, Left Updated:  04/09/17 2116    Lactic Acid, Plasma [[id number]]  (Normal) Collected:  04/09/17 2112    Specimen:  Blood Updated:  04/09/17 2132     Lactate 0.5 mmol/L     Troponin [[id number]]  (Normal) Collected:  04/09/17 2112    Specimen:  Blood Updated:  04/09/17 2242     Troponin I <0.012 ng/mL     CBC & Differential [[id number]] Collected:  04/10/17 0400    Specimen:  Blood Updated:  04/10/17 0409    Narrative:       The following orders were created for panel order CBC & Differential.  Procedure                               Abnormality         Status                     ---------                               -----------         ------                     CBC Auto Differential[[id number]]         Abnormal            Final result                 Please view results for these tests on the individual orders.    CBC Auto Differential [[id number]]  (Abnormal) Collected:  04/10/17 0400    Specimen:  Blood Updated:  04/10/17 0409     WBC 11.00 (H) 10*3/mm3      RBC 4.54 10*6/mm3      Hemoglobin 14.0 g/dL      Hematocrit 41.8 %      MCV 92.1 fL      MCH 30.8 pg      MCHC 33.5 g/dL      RDW 14.0 %      RDW-SD 47.6 (H) fl      MPV 9.8 fL      Platelets 189 10*3/mm3      Neutrophil % 75.9 %      Lymphocyte % 14.3 %      Monocyte % 7.0 %      Eosinophil % 2.0 %      Basophil % 0.5 %      Immature Grans % 0.3 %       Neutrophils, Absolute 8.36 10*3/mm3      Lymphocytes, Absolute 1.57 10*3/mm3      Monocytes, Absolute 0.77 10*3/mm3      Eosinophils, Absolute 0.22 10*3/mm3      Basophils, Absolute 0.05 10*3/mm3      Immature Grans, Absolute 0.03 (H) 10*3/mm3     Comprehensive Metabolic Panel [[id number]]  (Abnormal) Collected:  04/10/17 0400    Specimen:  Blood Updated:  04/10/17 0422     Glucose 97 mg/dL      BUN 11 mg/dL      Creatinine 0.80 mg/dL      Sodium 145 mmol/L      Potassium 3.6 mmol/L      Chloride 104 mmol/L      CO2 27.0 mmol/L      Calcium 8.7 mg/dL      Total Protein 7.1 g/dL      Albumin 3.90 g/dL      ALT (SGPT) 61 (H) U/L      AST (SGOT) 62 (H) U/L      Alkaline Phosphatase 97 U/L      Total Bilirubin 0.9 mg/dL      eGFR Non African Amer 78 mL/min/1.73      Globulin 3.2 gm/dL      A/G Ratio 1.2 g/dL      BUN/Creatinine Ratio 13.8     Anion Gap 14.0 mmol/L     Troponin [[id number]]  (Normal) Collected:  04/10/17 0400    Specimen:  Blood Updated:  04/10/17 0428     Troponin I <0.012 ng/mL     Blood Gas, Arterial [[id number]]  (Abnormal) Collected:  04/10/17 0456    Specimen:  Arterial Blood Updated:  04/10/17 0516     Site --      Not performed at this site.        Gary's Test --      Not performed at this site.        pH, Arterial 7.466 (H) pH units      pCO2, Arterial 35.8 mm Hg      pO2, Arterial 71.8 (L) mm Hg      HCO3, Arterial 25.2 mmol/L      Base Excess, Arterial 1.8 mmol/L      O2 Saturation, Arterial 95.7 %      Hemoglobin, Blood Gas 14.4 g/dL      Hematocrit, Blood Gas 42.0 %      CO2 Content 26.3     Sodium, Arterial 142.3 mmol/L      Potassium, Arterial 3.47 (L) mmol/L      Glucose, Arterial 108 mmol/L      Barometric Pressure for Blood Gas -- mmHg       Not performed at this site.        Modality --      Not performed at this site.        Ionized Calcium 4.7 mg/dL     Respiratory Culture [[id number]] Collected:  04/09/17 1638    Specimen:  Sputum from ET Suction Updated:  04/10/17 0619     Respiratory  Culture Normal Respiratory Donny     Gram Stain Result Many (4+) WBCs per low power field      Rare (1+) Epithelial cells per low power field      Mixed bacterial donny           Imaging: I personally visualized the relevant images of scans/x-rays performed.  Imaging Results (last 24 hours)     Procedure Component Value Units Date/Time    XR Chest 1 View [[id number]] Collected:  04/09/17 1728     Updated:  04/09/17 1733    Narrative:       Patient Name:  MS. ISAEL FELDER  Patient ID:  [id number]   Ordering:  DERRICK MAI  Attending:  DERRICK MAI  Referring:  DERRICK MAI  ------------------------------------------------    PORTABLE CHEST    HISTORY: Intubation    Portable AP supine film of the chest was obtained at 4:57 PM.  COMPARISON: October 2, 2016    EKG leads present.  Endotracheal tube in place with tip in the right mainstem  bronchus.  Associated opacification of the left hemithorax compatible with  atelectasis of the entire left lung.  Recommend withdrawing endotracheal tube 4.5 cm or more.  Discoid atelectasis right upper lobe.  Nasogastric tube in place with tip in the fundus of the stomach  directed cephalad and to the left.  Heart size difficult to evaluate.  The pulmonary vasculature is not increased.  No pleural effusion.  No pneumothorax.  No acute osseous abnormality.      Impression:       CONCLUSION:  Endotracheal tube in place with tip in the right mainstem  bronchus.  Associated opacification of the left hemithorax compatible with  atelectasis of the entire left lung.  Recommend withdrawing endotracheal tube 4.5 cm or more.  Discoid atelectasis right upper lobe.  Nasogastric tube in place with tip in the fundus of the stomach  directed cephalad and to the left.    Report called at approximately 3:40 PM CST.    [id number]    Electronically signed by:  Maikel Shannon MD  4/9/2017 5:32 PM CDT  Workstation: [id number]    XR Chest 1 View [[id number]] Collected:  04/09/17 5270      Updated:  04/09/17 1840    Narrative:       Patient Name:  MS. ISAEL FELDER  Patient ID:  [id number]   Ordering:  DEMOND SOLORIO  Attending:  DERRICK MAI  Referring:  DEMOND SOLORIO  ------------------------------------------------    PORTABLE CHEST    HISTORY: Position of endotracheal tube    Portable AP supine film of the chest was obtained at 5:54 PM.  COMPARISON: For 50 7:00 PM    Endotracheal tube now 3 cm above the anna marie.  Significant improvement in aeration of the left lung.  Nasogastric tube remains in place.  The heart is not enlarged.  The pulmonary vasculature is not increased.  No pleural effusion.  No pneumothorax.  No acute osseous abnormality.      Impression:       CONCLUSION:  Endotracheal tube now 3 cm above the anna marie.  Significant improvement in aeration of the left lung.  Nasogastric tube remains in place.    [id number]    Electronically signed by:  Maikel Shannon MD  4/9/2017 6:39 PM CDT  Workstation: [email or web address]    CT Chest Without Contrast [[id number]] Collected:  04/09/17 1857     Updated:  04/09/17 1906    Narrative:       Patient Name:  MS. ISAEL FELEDR  Patient ID:  [id number]   Ordering:  DEMOND SOLORIO  Attending:  DERRICK MAI  Referring:  DEMOND SOLORIO  ------------------------------------------------    CT Chest Without intravenous Contrast    History: Whiteout left lung.    Axial spiral scans of the chest were obtained without intravenous  contrast.  Coronal and sagital reconstructions were preformed.    DLP: 565.30    Comparison: None. Correlation with earlier chest radiographs.    Endotracheal tube in place with tip well above the anna marie.  Consolidation and volume posterior segment right upper lobe.  Subsegmental atelectasis or infiltrate apical segment right upper  lobe.  Residual left lower lobe segmental atelectasis or infiltrate.  No mass or noncalcified nodule.  No pleural effusion.  No pneumothorax.    No hilar, mediastinal or axillary adenopathy.  No  thoracic aortic aneurysm.  No pericardial effusion.    Nasogastric tube in place with tip laterally in the proximal  stomach.  The tip somewhat deforms or pushes the lateral wall the stomach  outward.    Old compression deformities thoracic spine with associated  accentuation of the dorsal kyphosis.  Degenerative changes in the thoracic spine.      Impression:       Conclusion:  Endotracheal tube in place with tip well above the anna marie.  Consolidation and volume posterior segment right upper lobe.  Subsegmental atelectasis or infiltrate apical segment right upper  lobe.  Residual left lower lobe segmental atelectasis or infiltrate.  Nasogastric tube in place with tip laterally in the proximal  stomach.  The tip somewhat deforms or pushes the lateral wall the stomach  outward.    [id number]    Electronically signed by:  Maikel Shannon MD  4/9/2017 7:05 PM CDT  Workstation: [email or web address] Chest 1 View [[id number]] Collected:  04/10/17 0633     Updated:  04/10/17 0636    Narrative:       Exam: AP portable chest.    INDICATION: Intubated    COMPARISON: 4/9/2017    FINDINGS: Endotracheal tube tip is 2.7 cm above the anna marie. NG  tube extends into the stomach. Heart size is normal. Perhaps  slight decrease in the right upper lobe consolidation. There is a  no significant interval change in the left lower lobe atelectasis  and/or consolidation.      Impression:       Perhaps slight improvement in aeration in the right  upper lung    Electronically signed by:  Daren Cedeno MD  4/10/2017 6:35 AM  CDT Workstation: [id number]            Assessment/Plan     Assessment:  # Intentional overdose- elavil and gabapentin  # Acute hypoxic respiratory failure due to above  # Probable aspiration pneumonitis  # Anxiety/depression  # Hypertension  # Hepatitis C      Recommendations:  -Change to PSV 12/5 with good compliance  -Wean sedation off. Once awake and following commands, would extubate as she has a good cough and will be able  to protect her airway  -Sputum culture  -Empiric clindamycin  -Psych consult once extubated  -Bedside swallow eval once extubated  -PPX:  Lovenox    D/w RN and Dr. Dominguez    Critical care time spent: 47 minutes  This time excludes other billable procedures. Time does include preparation of documents, medical consultations, review of old records, and direct bedside care.       Thank you for allowing me to participate in the care of Rozina Mccartney. Please do not hesitate to contact me with any questions.       This document has been electronically signed by Mariana Shannon MD on April 10, 2017 8:26 AM      [phone]    EMR Dragon/Transcription disclaimer:     Much of this encounter note is an electronic transcription/translation of spoken language to printed text. The electronic translation of spoken language may permit erroneous, or at times, nonsensical words or phrases to be inadvertently transcribed; Although I have reviewed the note for such errors, some may still exist.

## 2017-04-10 NOTE — PROGRESS NOTES
FAMILY MEDICINE DAILY PROGRESS NOTE  NAME: Rozina Mccartney  : 1970  MRN: [id number]     LOS: 1 day     PROVIDER OF SERVICE: Sandra Dominguez MD    Chief Complaint: Intentional overdose with gabapentin and elavil    Subjective:     Interval History:  History taken from: chart RN  Patient Complaints: unobtainable, on ventilator  Patient Denies:  Unobtainable, on ventilator    Patient still intubated/sedated with propofol.  No acute events overnight. Urine with sediment in sanchez.  OG with ~200ML.       Review of Systems:   Review of Systems   Unable to perform ROS: Intubated       Objective:     Vital Signs  Temp:  [97.5 °F (36.4 °C)-98.2 °F (36.8 °C)] 98.2 °F (36.8 °C)  Heart Rate:  [64-91] 88  Resp:  [12-20] 20  BP: ()/() 128/74  FiO2 (%):  [30 %-100 %] 30 %    Physical Exam  Physical Exam   Constitutional: She appears well-developed and well-nourished. No distress.   HENT:   Head: Normocephalic and atraumatic.   Nose: Nose normal.   OG and ET in place   Eyes: Pupils are equal, round, and reactive to light. Right eye exhibits no discharge. Left eye exhibits no discharge.   Cardiovascular: Normal rate, regular rhythm, normal heart sounds and intact distal pulses.    Pulmonary/Chest: No respiratory distress.   Intubated, coarse BS bilaterally   Abdominal: Soft. Bowel sounds are normal. She exhibits no distension. There is no tenderness.   Lymphadenopathy:     She has no cervical adenopathy.   Neurological:   Will respond to verbal commands, can move hands and feet   Skin: Skin is warm and dry. No rash noted. She is not diaphoretic. No erythema.   Nursing note and vitals reviewed.      Medication Review    Current Facility-Administered Medications:   •  albuterol (PROVENTIL) nebulizer solution 0.083% 2.5 mg/3mL, 2.5 mg, Nebulization, Q4H - RT, Sandra Dominguez MD  •  clindamycin (CLEOCIN) injection 600 mg, 600 mg, Intramuscular, Q8H, Sandra Dominguez MD, 600 mg at 04/10/17 0631  •  dextrose  5 % and sodium chloride 0.45 % with KCl 20 mEq/L infusion, 100 mL/hr, Intravenous, Continuous, Sandra Dominguez MD, Last Rate: 100 mL/hr at 04/09/17 2232, 100 mL/hr at 04/09/17 2232  •  enoxaparin (LOVENOX) syringe 40 mg, 40 mg, Subcutaneous, Daily, Sandra Dominguez MD  •  propofol (DIPRIVAN) infusion 10 mg/mL 100 mL, 5-50 mcg/kg/min, Intravenous, Titrated, Ozzy Angel MD, Last Rate: 17.15 mL/hr at 04/10/17 0414, 30 mcg/kg/min at 04/10/17 0414  •  sodium chloride 0.9 % bolus 500 mL, 500 mL, Intravenous, Once, Sandra Dominguez MD  •  sodium chloride 0.9 % flush 1-10 mL, 1-10 mL, Intravenous, PRN, Sandra Dominguez MD  •  sodium chloride 0.9 % flush 10 mL, 10 mL, Intravenous, PRN, Ozzy Angel MD     Diagnostic Data    Lab Results (last 24 hours)     Procedure Component Value Units Date/Time    CBC & Differential [[id number]] Collected:  04/09/17 1603    Specimen:  Blood Updated:  04/09/17 1612    Narrative:       The following orders were created for panel order CBC & Differential.  Procedure                               Abnormality         Status                     ---------                               -----------         ------                     CBC Auto Differential[[id number]]         Normal              Final result                 Please view results for these tests on the individual orders.    CBC Auto Differential [[id number]]  (Normal) Collected:  04/09/17 1603    Specimen:  Blood Updated:  04/09/17 1612     WBC 9.61 10*3/mm3      RBC 4.30 10*6/mm3      Hemoglobin 13.2 g/dL      Hematocrit 38.9 %      MCV 90.5 fL      MCH 30.7 pg      MCHC 33.9 g/dL      RDW 13.7 %      RDW-SD 45.3 fl      MPV 9.9 fL      Platelets 231 10*3/mm3      Neutrophil % 67.9 %      Lymphocyte % 21.1 %      Monocyte % 8.3 %      Eosinophil % 2.1 %      Basophil % 0.4 %      Immature Grans % 0.2 %      Neutrophils, Absolute 6.52 10*3/mm3      Lymphocytes, Absolute 2.03 10*3/mm3      Monocytes, Absolute 0.80  10*3/mm3      Eosinophils, Absolute 0.20 10*3/mm3      Basophils, Absolute 0.04 10*3/mm3      Immature Grans, Absolute 0.02 10*3/mm3     hCG, Serum, Qualitative [[id number]]  (Normal) Collected:  04/09/17 1603    Specimen:  Blood Updated:  04/09/17 1620     HCG Qualitative Negative    Comprehensive Metabolic Panel [[id number]]  (Abnormal) Collected:  04/09/17 1603    Specimen:  Blood Updated:  04/09/17 1622     Glucose 95 mg/dL      BUN 14 mg/dL      Creatinine 0.76 mg/dL      Sodium 139 mmol/L      Potassium 3.6 mmol/L      Chloride 106 mmol/L      CO2 21.0 (L) mmol/L      Calcium 9.2 mg/dL      Total Protein 7.0 g/dL      Albumin 3.90 g/dL      ALT (SGPT) 56 (H) U/L      AST (SGOT) 53 (H) U/L      Alkaline Phosphatase 101 U/L      Total Bilirubin 1.2 mg/dL      eGFR Non African Amer 83 mL/min/1.73      Globulin 3.1 gm/dL      A/G Ratio 1.3 g/dL      BUN/Creatinine Ratio 18.4     Anion Gap 12.0 mmol/L     Salicylate Level [[id number]]  (Abnormal) Collected:  04/09/17 1603    Specimen:  Blood Updated:  04/09/17 1622     Salicylate <1.0 (L) mg/dL     Acetaminophen Level [[id number]]  (Abnormal) Collected:  04/09/17 1603    Specimen:  Blood Updated:  04/09/17 1625     Acetaminophen <10.0 (L) mcg/mL     Protime-INR [[id number]]  (Normal) Collected:  04/09/17 1603    Specimen:  Blood Updated:  04/09/17 1635     Protime 13.5 Seconds      INR 1.04    Narrative:       Therapeutic range for most indications is 2.0-3.0 INR,  or 2.5-3.5 for mechanical heart valves.    Ethanol [[id number]] Collected:  04/09/17 1603    Specimen:  Blood Updated:  04/09/17 1640     Ethanol <10 mg/dL      Ethanol % <0.010 %     TSH [[id number]]  (Normal) Collected:  04/09/17 1603    Specimen:  Blood Updated:  04/09/17 1653     TSH 1.150 mIU/mL     POC Glucose Fingerstick [[id number]]  (Normal) Collected:  04/09/17 1656    Specimen:  Blood Updated:  04/09/17 1656     Glucose 106 mg/dL     Blood Gas, Arterial [[id number]]  (Abnormal) Collected:  04/09/17 2522     Specimen:  Arterial Blood Updated:  04/09/17 1717     Site --      Not performed at this site.        Gary's Test --      Not performed at this site.        pH, Arterial 7.292 (L) pH units      pCO2, Arterial 47.6 (H) mm Hg      pO2, Arterial 148.4 (H) mm Hg      HCO3, Arterial 22.5 mmol/L      Base Excess, Arterial -4.2 (L) mmol/L      O2 Saturation, Arterial 98.9 %      Hemoglobin, Blood Gas 13.4 g/dL      Hematocrit, Blood Gas 39.0 %      CO2 Content 23.9     Sodium, Arterial 144.6 mmol/L      Potassium, Arterial 3.55 (L) mmol/L      Glucose, Arterial 99 mmol/L      Barometric Pressure for Blood Gas -- mmHg       Not performed at this site.        Modality --      Not performed at this site.        Ionized Calcium 4.9 mg/dL     Urine Drug Screen [[id number]]  (Abnormal) Collected:  04/09/17 1701    Specimen:  Urine from Urine, Clean Catch Updated:  04/09/17 1748     Amphetamine Screen, Urine Positive (A)     Barbiturates Screen, Urine Negative     Benzodiazepine Screen, Urine Negative     Cocaine Screen, Urine Negative     Methadone Screen, Urine Negative     Opiate Screen Negative     Oxycodone Screen, Urine Negative     THC, Screen, Urine Negative    Narrative:       Negative Thresholds For Drugs Screened in Urine:     Amphetamines          500 ng/ml  Barbiturates          200 ng/ml  Benzodiazepines       200 ng/ml  Cocaine               150 ng/ml  Methadone             300 ng/mL  Opiates               300 ng/mL  Oxycodone             100 ng/mL  THC                   20 ng/mL    The normal value for all drugs tested is negative. This report includes final unconfirmed screening results.  A positive result by this assay can be, at your request, sent to the Reference Lab for confirmation by gas chromatography. Unconfirmed results must not be used for non-medical purposes, such as employment or legal testing. Clinical consideration should be applied to any drug of abuse test result, particularly when unconfirmed  results are used.    POC Glucose Fingerstick [[id number]]  (Normal) Collected:  04/09/17 1655    Specimen:  Blood Updated:  04/09/17 1808     Glucose 106 mg/dL       Meter: [id number]: [id number] ALONDRA MANCIA       [place] Draw [[id number]] Collected:  04/09/17 1603    Specimen:  Blood Updated:  04/09/17 2101    Narrative:       The following orders were created for panel order [place] Draw.  Procedure                               Abnormality         Status                     ---------                               -----------         ------                     Light Blue Top[[id number]]                                    Final result               Green Top (Gel)[[id number]]                                   Final result               Lavender Top[[id number]]                                      Final result               Gold Top - SST[[id number]]                                    Final result                 Please view results for these tests on the individual orders.    Light Blue Top [[id number]] Collected:  04/09/17 1603    Specimen:  Blood Updated:  04/09/17 2101     Extra Tube hold for add-on      Auto resulted       Green Top (Gel) [[id number]] Collected:  04/09/17 1603    Specimen:  Blood Updated:  04/09/17 2101     Extra Tube Hold for add-ons.      Auto resulted.       Lavender Top [[id number]] Collected:  04/09/17 1603    Specimen:  Blood Updated:  04/09/17 2101     Extra Tube hold for add-on      Auto resulted       Gold Top - SST [[id number]] Collected:  04/09/17 1603    Specimen:  Blood Updated:  04/09/17 2101     Extra Tube Hold for add-ons.      Auto resulted.       Blood Culture [[id number]] Collected:  04/09/17 2112    Specimen:  Blood from Wrist, Left Updated:  04/09/17 2116    Blood Culture [[id number]] Collected:  04/09/17 2102    Specimen:  Blood from Arm, Left Updated:  04/09/17 2116    Lactic Acid, Plasma [[id number]]  (Normal) Collected:  04/09/17 2112    Specimen:  Blood Updated:  04/09/17 2132      Lactate 0.5 mmol/L     Troponin [[id number]]  (Normal) Collected:  04/09/17 2112    Specimen:  Blood Updated:  04/09/17 2242     Troponin I <0.012 ng/mL     CBC & Differential [[id number]] Collected:  04/10/17 0400    Specimen:  Blood Updated:  04/10/17 0409    Narrative:       The following orders were created for panel order CBC & Differential.  Procedure                               Abnormality         Status                     ---------                               -----------         ------                     CBC Auto Differential[[id number]]         Abnormal            Final result                 Please view results for these tests on the individual orders.    CBC Auto Differential [[id number]]  (Abnormal) Collected:  04/10/17 0400    Specimen:  Blood Updated:  04/10/17 0409     WBC 11.00 (H) 10*3/mm3      RBC 4.54 10*6/mm3      Hemoglobin 14.0 g/dL      Hematocrit 41.8 %      MCV 92.1 fL      MCH 30.8 pg      MCHC 33.5 g/dL      RDW 14.0 %      RDW-SD 47.6 (H) fl      MPV 9.8 fL      Platelets 189 10*3/mm3      Neutrophil % 75.9 %      Lymphocyte % 14.3 %      Monocyte % 7.0 %      Eosinophil % 2.0 %      Basophil % 0.5 %      Immature Grans % 0.3 %      Neutrophils, Absolute 8.36 10*3/mm3      Lymphocytes, Absolute 1.57 10*3/mm3      Monocytes, Absolute 0.77 10*3/mm3      Eosinophils, Absolute 0.22 10*3/mm3      Basophils, Absolute 0.05 10*3/mm3      Immature Grans, Absolute 0.03 (H) 10*3/mm3     Comprehensive Metabolic Panel [[id number]]  (Abnormal) Collected:  04/10/17 0400    Specimen:  Blood Updated:  04/10/17 0422     Glucose 97 mg/dL      BUN 11 mg/dL      Creatinine 0.80 mg/dL      Sodium 145 mmol/L      Potassium 3.6 mmol/L      Chloride 104 mmol/L      CO2 27.0 mmol/L      Calcium 8.7 mg/dL      Total Protein 7.1 g/dL      Albumin 3.90 g/dL      ALT (SGPT) 61 (H) U/L      AST (SGOT) 62 (H) U/L      Alkaline Phosphatase 97 U/L      Total Bilirubin 0.9 mg/dL      eGFR Non African Amer 78 mL/min/1.73       Globulin 3.2 gm/dL      A/G Ratio 1.2 g/dL      BUN/Creatinine Ratio 13.8     Anion Gap 14.0 mmol/L     Troponin [[id number]]  (Normal) Collected:  04/10/17 0400    Specimen:  Blood Updated:  04/10/17 0428     Troponin I <0.012 ng/mL     Blood Gas, Arterial [[id number]]  (Abnormal) Collected:  04/10/17 0456    Specimen:  Arterial Blood Updated:  04/10/17 0516     Site --      Not performed at this site.        Gary's Test --      Not performed at this site.        pH, Arterial 7.466 (H) pH units      pCO2, Arterial 35.8 mm Hg      pO2, Arterial 71.8 (L) mm Hg      HCO3, Arterial 25.2 mmol/L      Base Excess, Arterial 1.8 mmol/L      O2 Saturation, Arterial 95.7 %      Hemoglobin, Blood Gas 14.4 g/dL      Hematocrit, Blood Gas 42.0 %      CO2 Content 26.3     Sodium, Arterial 142.3 mmol/L      Potassium, Arterial 3.47 (L) mmol/L      Glucose, Arterial 108 mmol/L      Barometric Pressure for Blood Gas -- mmHg       Not performed at this site.        Modality --      Not performed at this site.        Ionized Calcium 4.7 mg/dL     Respiratory Culture [[id number]] Collected:  04/09/17 1638    Specimen:  Sputum from ET Suction Updated:  04/10/17 0619     Respiratory Culture Normal Respiratory Donny     Gram Stain Result Many (4+) WBCs per low power field      Rare (1+) Epithelial cells per low power field      Mixed bacterial donny            I reviewed the patient's new clinical results.    Assessment/Plan:     Active Hospital Problems (** Indicates Principal Problem)    Diagnosis Date Noted   • Overdose [T50.901A] 04/09/2017     Patient currently intubated and sedated  UDS positive for amphetamines  Patient reported taking gabapentin and Elavil per EMS   Will provide supportive management with IVF  Will maintain on cardiac monitor   Will attempt extubation today.      • Suicidal overdose [T50.902A] 04/09/2017     Will obtain a psych consult when patient appropriate     • Atelectasis of left lung [J98.11] 04/09/2017      Possibly due to improper ET placement  ET repositioned in ED   CT scan review shows atelectasis, possible infiltrate.    Will treat for possible aspiration PNA with Clinda IV     • Obesity due to excess calories [E66.09] 04/09/2017   • Chronic hepatitis C virus infection [B18.2] 04/09/2017     Will monitor LFTs         Resolved Hospital Problems    Diagnosis Date Noted Date Resolved   No resolved problems to display.       DVT prophylaxis: Lovenox  Code status is Full Code    Plan for disposition:Psych eval once medically stable      Time: Critical care 30 min           This document has been electronically signed by Sandra Dominguez MD on April 10, 2017 7:18 AM

## 2017-04-10 NOTE — PROGRESS NOTES
LOS: 1 day   Patient Care Team:  No Known Provider as PCP - General    Chief Complaint: On vent secondary to O/D suicide attempt.      Subjective     Interval History:     Patient Complaints: none  Patient Denies:  none  History taken from: chart    Review of Systems:    Review of Systems   Unable to perform ROS: Intubated       Objective     Vital Signs  Temp:  [97.5 °F (36.4 °C)-98.2 °F (36.8 °C)] 98.2 °F (36.8 °C)  Heart Rate:  [64-91] 86  Resp:  [12-20] 17  BP: ()/() 128/74  FiO2 (%):  [30 %-100 %] 30 %    Physical Exam:   Physical Exam   Constitutional: Vital signs are normal. She appears well-developed and well-nourished. She is sedated and intubated.   HENT:   Head: Normocephalic and atraumatic.   Mouth/Throat: Oropharynx is clear and moist.   Eyes: EOM are normal.   Neck: Normal range of motion. Neck supple.   Cardiovascular: Normal rate, regular rhythm and normal heart sounds.    Pulmonary/Chest: She is intubated. She has decreased breath sounds in the left upper field, the left middle field and the left lower field.   Abdominal: Soft. Bowel sounds are normal. She exhibits no distension. There is no tenderness.   Neurological:   Sedated and intubated   Skin: Skin is warm and dry.   Psychiatric: Her speech is normal. Cognition and memory are normal.        Results Review:       Lab Results (last 24 hours)     Procedure Component Value Units Date/Time    CBC & Differential [[id number]] Collected:  04/09/17 1603    Specimen:  Blood Updated:  04/09/17 1612    Narrative:       The following orders were created for panel order CBC & Differential.  Procedure                               Abnormality         Status                     ---------                               -----------         ------                     CBC Auto Differential[[id number]]         Normal              Final result                 Please view results for these tests on the individual orders.    CBC Auto Differential  [[id number]]  (Normal) Collected:  04/09/17 1603    Specimen:  Blood Updated:  04/09/17 1612     WBC 9.61 10*3/mm3      RBC 4.30 10*6/mm3      Hemoglobin 13.2 g/dL      Hematocrit 38.9 %      MCV 90.5 fL      MCH 30.7 pg      MCHC 33.9 g/dL      RDW 13.7 %      RDW-SD 45.3 fl      MPV 9.9 fL      Platelets 231 10*3/mm3      Neutrophil % 67.9 %      Lymphocyte % 21.1 %      Monocyte % 8.3 %      Eosinophil % 2.1 %      Basophil % 0.4 %      Immature Grans % 0.2 %      Neutrophils, Absolute 6.52 10*3/mm3      Lymphocytes, Absolute 2.03 10*3/mm3      Monocytes, Absolute 0.80 10*3/mm3      Eosinophils, Absolute 0.20 10*3/mm3      Basophils, Absolute 0.04 10*3/mm3      Immature Grans, Absolute 0.02 10*3/mm3     hCG, Serum, Qualitative [[id number]]  (Normal) Collected:  04/09/17 1603    Specimen:  Blood Updated:  04/09/17 1620     HCG Qualitative Negative    Comprehensive Metabolic Panel [[id number]]  (Abnormal) Collected:  04/09/17 1603    Specimen:  Blood Updated:  04/09/17 1622     Glucose 95 mg/dL      BUN 14 mg/dL      Creatinine 0.76 mg/dL      Sodium 139 mmol/L      Potassium 3.6 mmol/L      Chloride 106 mmol/L      CO2 21.0 (L) mmol/L      Calcium 9.2 mg/dL      Total Protein 7.0 g/dL      Albumin 3.90 g/dL      ALT (SGPT) 56 (H) U/L      AST (SGOT) 53 (H) U/L      Alkaline Phosphatase 101 U/L      Total Bilirubin 1.2 mg/dL      eGFR Non African Amer 83 mL/min/1.73      Globulin 3.1 gm/dL      A/G Ratio 1.3 g/dL      BUN/Creatinine Ratio 18.4     Anion Gap 12.0 mmol/L     Salicylate Level [[id number]]  (Abnormal) Collected:  04/09/17 1603    Specimen:  Blood Updated:  04/09/17 1622     Salicylate <1.0 (L) mg/dL     Acetaminophen Level [[id number]]  (Abnormal) Collected:  04/09/17 1603    Specimen:  Blood Updated:  04/09/17 1625     Acetaminophen <10.0 (L) mcg/mL     Protime-INR [[id number]]  (Normal) Collected:  04/09/17 1603    Specimen:  Blood Updated:  04/09/17 1635     Protime 13.5 Seconds      INR 1.04    Narrative:        Therapeutic range for most indications is 2.0-3.0 INR,  or 2.5-3.5 for mechanical heart valves.    Ethanol [[id number]] Collected:  04/09/17 1603    Specimen:  Blood Updated:  04/09/17 1640     Ethanol <10 mg/dL      Ethanol % <0.010 %     TSH [[id number]]  (Normal) Collected:  04/09/17 1603    Specimen:  Blood Updated:  04/09/17 1653     TSH 1.150 mIU/mL     POC Glucose Fingerstick [[id number]]  (Normal) Collected:  04/09/17 1656    Specimen:  Blood Updated:  04/09/17 1656     Glucose 106 mg/dL     Blood Gas, Arterial [[id number]]  (Abnormal) Collected:  04/09/17 1717    Specimen:  Arterial Blood Updated:  04/09/17 1717     Site --      Not performed at this site.        Gary's Test --      Not performed at this site.        pH, Arterial 7.292 (L) pH units      pCO2, Arterial 47.6 (H) mm Hg      pO2, Arterial 148.4 (H) mm Hg      HCO3, Arterial 22.5 mmol/L      Base Excess, Arterial -4.2 (L) mmol/L      O2 Saturation, Arterial 98.9 %      Hemoglobin, Blood Gas 13.4 g/dL      Hematocrit, Blood Gas 39.0 %      CO2 Content 23.9     Sodium, Arterial 144.6 mmol/L      Potassium, Arterial 3.55 (L) mmol/L      Glucose, Arterial 99 mmol/L      Barometric Pressure for Blood Gas -- mmHg       Not performed at this site.        Modality --      Not performed at this site.        Ionized Calcium 4.9 mg/dL     Urine Drug Screen [[id number]]  (Abnormal) Collected:  04/09/17 1701    Specimen:  Urine from Urine, Clean Catch Updated:  04/09/17 1748     Amphetamine Screen, Urine Positive (A)     Barbiturates Screen, Urine Negative     Benzodiazepine Screen, Urine Negative     Cocaine Screen, Urine Negative     Methadone Screen, Urine Negative     Opiate Screen Negative     Oxycodone Screen, Urine Negative     THC, Screen, Urine Negative    Narrative:       Negative Thresholds For Drugs Screened in Urine:     Amphetamines          500 ng/ml  Barbiturates          200 ng/ml  Benzodiazepines       200 ng/ml  Cocaine               150  ng/ml  Methadone             300 ng/mL  Opiates               300 ng/mL  Oxycodone             100 ng/mL  THC                   20 ng/mL    The normal value for all drugs tested is negative. This report includes final unconfirmed screening results.  A positive result by this assay can be, at your request, sent to the Reference Lab for confirmation by gas chromatography. Unconfirmed results must not be used for non-medical purposes, such as employment or legal testing. Clinical consideration should be applied to any drug of abuse test result, particularly when unconfirmed results are used.    POC Glucose Fingerstick [[id number]]  (Normal) Collected:  04/09/17 1655    Specimen:  Blood Updated:  04/09/17 1808     Glucose 106 mg/dL       Meter: [id number]: [id number] ALONDRA MANCIA       [facility] [[id number]] Collected:  04/09/17 1603    Specimen:  Blood Updated:  04/09/17 2101    Narrative:       The following orders were created for panel order [facility].  Procedure                               Abnormality         Status                     ---------                               -----------         ------                     Light Blue Top[[id number]]                                    Final result               Green Top (Gel)[[id number]]                                   Final result               Lavender Top[[id number]]                                      Final result               Gold Top - SST[[id number]]                                    Final result                 Please view results for these tests on the individual orders.    Light Blue Top [[id number]] Collected:  04/09/17 1603    Specimen:  Blood Updated:  04/09/17 2101     Extra Tube hold for add-on      Auto resulted       Green Top (Gel) [[id number]] Collected:  04/09/17 1603    Specimen:  Blood Updated:  04/09/17 2101     Extra Tube Hold for add-ons.      Auto resulted.       Lavender Top [[id number]] Collected:  04/09/17 1603    Specimen:  Blood  Updated:  04/09/17 2101     Extra Tube hold for add-on      Auto resulted       Gold Top - SST [[id number]] Collected:  04/09/17 1603    Specimen:  Blood Updated:  04/09/17 2101     Extra Tube Hold for add-ons.      Auto resulted.       Blood Culture [[id number]] Collected:  04/09/17 2112    Specimen:  Blood from Wrist, Left Updated:  04/09/17 2116    Blood Culture [[id number]] Collected:  04/09/17 2102    Specimen:  Blood from Arm, Left Updated:  04/09/17 2116    Lactic Acid, Plasma [[id number]]  (Normal) Collected:  04/09/17 2112    Specimen:  Blood Updated:  04/09/17 2132     Lactate 0.5 mmol/L     Troponin [[id number]]  (Normal) Collected:  04/09/17 2112    Specimen:  Blood Updated:  04/09/17 2242     Troponin I <0.012 ng/mL     CBC & Differential [[id number]] Collected:  04/10/17 0400    Specimen:  Blood Updated:  04/10/17 0409    Narrative:       The following orders were created for panel order CBC & Differential.  Procedure                               Abnormality         Status                     ---------                               -----------         ------                     CBC Auto Differential[[id number]]         Abnormal            Final result                 Please view results for these tests on the individual orders.    CBC Auto Differential [[id number]]  (Abnormal) Collected:  04/10/17 0400    Specimen:  Blood Updated:  04/10/17 0409     WBC 11.00 (H) 10*3/mm3      RBC 4.54 10*6/mm3      Hemoglobin 14.0 g/dL      Hematocrit 41.8 %      MCV 92.1 fL      MCH 30.8 pg      MCHC 33.5 g/dL      RDW 14.0 %      RDW-SD 47.6 (H) fl      MPV 9.8 fL      Platelets 189 10*3/mm3      Neutrophil % 75.9 %      Lymphocyte % 14.3 %      Monocyte % 7.0 %      Eosinophil % 2.0 %      Basophil % 0.5 %      Immature Grans % 0.3 %      Neutrophils, Absolute 8.36 10*3/mm3      Lymphocytes, Absolute 1.57 10*3/mm3      Monocytes, Absolute 0.77 10*3/mm3      Eosinophils, Absolute 0.22 10*3/mm3      Basophils, Absolute 0.05  10*3/mm3      Immature Grans, Absolute 0.03 (H) 10*3/mm3     Comprehensive Metabolic Panel [[id number]]  (Abnormal) Collected:  04/10/17 0400    Specimen:  Blood Updated:  04/10/17 0422     Glucose 97 mg/dL      BUN 11 mg/dL      Creatinine 0.80 mg/dL      Sodium 145 mmol/L      Potassium 3.6 mmol/L      Chloride 104 mmol/L      CO2 27.0 mmol/L      Calcium 8.7 mg/dL      Total Protein 7.1 g/dL      Albumin 3.90 g/dL      ALT (SGPT) 61 (H) U/L      AST (SGOT) 62 (H) U/L      Alkaline Phosphatase 97 U/L      Total Bilirubin 0.9 mg/dL      eGFR Non African Amer 78 mL/min/1.73      Globulin 3.2 gm/dL      A/G Ratio 1.2 g/dL      BUN/Creatinine Ratio 13.8     Anion Gap 14.0 mmol/L     Troponin [[id number]]  (Normal) Collected:  04/10/17 0400    Specimen:  Blood Updated:  04/10/17 0428     Troponin I <0.012 ng/mL     Blood Gas, Arterial [[id number]]  (Abnormal) Collected:  04/10/17 0456    Specimen:  Arterial Blood Updated:  04/10/17 0516     Site --      Not performed at this site.        Gary's Test --      Not performed at this site.        pH, Arterial 7.466 (H) pH units      pCO2, Arterial 35.8 mm Hg      pO2, Arterial 71.8 (L) mm Hg      HCO3, Arterial 25.2 mmol/L      Base Excess, Arterial 1.8 mmol/L      O2 Saturation, Arterial 95.7 %      Hemoglobin, Blood Gas 14.4 g/dL      Hematocrit, Blood Gas 42.0 %      CO2 Content 26.3     Sodium, Arterial 142.3 mmol/L      Potassium, Arterial 3.47 (L) mmol/L      Glucose, Arterial 108 mmol/L      Barometric Pressure for Blood Gas -- mmHg       Not performed at this site.        Modality --      Not performed at this site.        Ionized Calcium 4.7 mg/dL     Respiratory Culture [[id number]] Collected:  04/09/17 1638    Specimen:  Sputum from ET Suction Updated:  04/10/17 0619     Respiratory Culture Normal Respiratory Donny     Gram Stain Result Many (4+) WBCs per low power field      Rare (1+) Epithelial cells per low power field      Mixed bacterial donny          Medication  Review:   Current Facility-Administered Medications   Medication Dose Route Frequency Provider Last Rate Last Dose   • albuterol (PROVENTIL) nebulizer solution 0.083% 2.5 mg/3mL  2.5 mg Nebulization Q4H - RT Sandra Dominguez MD       • clindamycin (CLEOCIN) injection 600 mg  600 mg Intramuscular Q8H Sandra Dominguez MD   600 mg at 04/10/17 0631   • dextrose 5 % and sodium chloride 0.45 % with KCl 20 mEq/L infusion  100 mL/hr Intravenous Continuous Sandra Dominguez  mL/hr at 04/09/17 2232 100 mL/hr at 04/09/17 2232   • enoxaparin (LOVENOX) syringe 40 mg  40 mg Subcutaneous Daily Sandra Dominguez MD       • propofol (DIPRIVAN) infusion 10 mg/mL 100 mL  5-50 mcg/kg/min Intravenous Titrated Ozzy Angel MD 17.15 mL/hr at 04/10/17 0414 30 mcg/kg/min at 04/10/17 0414   • sodium chloride 0.9 % bolus 500 mL  500 mL Intravenous Once Sandra Dominguez MD       • sodium chloride 0.9 % flush 1-10 mL  1-10 mL Intravenous PRN Sandra Dominguez MD       • sodium chloride 0.9 % flush 10 mL  10 mL Intravenous PRN Ozzy Angel MD           Exam: AP portable chest.     INDICATION: Intubated     COMPARISON: 4/9/2017     FINDINGS: Endotracheal tube tip is 2.7 cm above the anna marie. NG  tube extends into the stomach. Heart size is normal. Perhaps  slight decrease in the right upper lobe consolidation. There is a  no significant interval change in the left lower lobe atelectasis  and/or consolidation.     IMPRESSION:  Perhaps slight improvement in aeration in the right  upper lung     Electronically signed by: Daren Cedeno MD 4/10/2017 6:35 AM  CDT Workstation: [id number]               Assessment/Plan     Active Problems:    Overdose    Suicidal overdose    Atelectasis of left lung    Obesity due to excess calories    Chronic hepatitis C virus infection    Plan    Extubate later today per pulm.    I have examined the patient and reviewed the pertinent diagnostic data. I have discussed the case  with the Family Medicine Resident and agree with the assessment and plan of care.    Leo Guthrie DO             This document has been electronically signed by Leo Guthrie DO on April 10, 2017 8:41 AM

## 2017-04-10 NOTE — PLAN OF CARE
Problem: Patient Care Overview (Adult)  Goal: Plan of Care Review  Outcome: Ongoing (interventions implemented as appropriate)    04/10/17 3968   Coping/Psychosocial Response Interventions   Plan Of Care Reviewed With patient   Patient Care Overview   Progress improving   Outcome Evaluation   Outcome Summary/Follow up Plan Pt extubated today, tolerating well, vitals stable, oriented x4.         Problem: SAFETY - NON-VIOLENT RESTRAINT  Goal: Remains free of injury from restraints (Non-Violent Restraint)  Outcome: Outcome(s) achieved Date Met:  04/10/17  Goal: Free from restraint(s) (Non-Violent Restraint)  Outcome: Outcome(s) achieved Date Met:  04/10/17    Problem: Mechanical Ventilation, Invasive (Adult)  Goal: Signs and Symptoms of Listed Potential Problems Will be Absent or Manageable (Mechanical Ventilation, Invasive)  Outcome: Outcome(s) achieved Date Met:  04/10/17    Problem: Airway, Artificial (Adult)  Goal: Signs and Symptoms of Listed Potential Problems Will be Absent or Manageable (Airway, Artificial)  Outcome: Outcome(s) achieved Date Met:  04/10/17    Problem: Fall Risk (Adult)  Goal: Identify Related Risk Factors and Signs and Symptoms  Outcome: Outcome(s) achieved Date Met:  04/10/17  Goal: Absence of Falls  Outcome: Ongoing (interventions implemented as appropriate)

## 2017-04-10 NOTE — PROGRESS NOTES
TWO PATIENT IDENTIFIERS WERE USED. CONSENT WAS SIGNED PER FAMILY EDUCATION MATERIAL WAS GIVEN TO PATIENT AND / OR FAMILY. THE PATIENT WAS DRAPED WITH FULL BODY DRAPE AND PATIENT'SRIGHT   ARM WAS PREPPED WITH CHLORAPREP.  ULTRASOUND WAS USED TO LOCALIZE THERIGHT BASILIC VEIN. SUBCUTANEOUS TISSUE AT THE CATHETER SITE WAS INFILTRATED WITH 2% LIDOCAINE. UNDER ULTRASOUND GUIDANCE, THE VEIN WAS ACCESSED WITH A 21GAUGE  NEEDLE. AN 0.018 WIRE WAS THEN THREADED THROUGH THE NEEDLE INTO THE CENTRAL VENOUS SYSTEM. THE 21GAUGE  NEEDLE WAS REMOVED AND A 6 FRENCHPEEL AWAY SHEATH WAS PLACED OVER THE WIRE. THE PICC LINE CATHETER WAS CUT AT 35 CM. THE PICC LINE CATHETER WAS THEN PLACED OVER THE WIRE INTO THE VEIN, THE SHEATH WAS PEELED AWAY,WIRE WAS REMOVED. CATHETER WAS FLUSHED WITH NORMAL SALINE AND TIPS APPLIED. BIOPATCH PLACED. CATHETER SECURED WITHSTATLOCK  AND TEGADERM. PATIENT TOLERATED PROCEDURE WELL. THIS WAS DONE IN    ICU      IMPRESSION: SUCCESSFUL PLACEMENT OF TRIPLE LUMEN PICC        Judy Mathias  4/10/2017  1:46 PM

## 2017-04-11 PROBLEM — J69.0 ASPIRATION PNEUMONIA (HCC): Status: ACTIVE | Noted: 2017-04-11

## 2017-04-11 LAB
ALBUMIN SERPL-MCNC: 3.3 G/DL (ref 3.4–4.8)
ALBUMIN/GLOB SERPL: 1.1 G/DL (ref 1.1–1.8)
ALP SERPL-CCNC: 88 U/L (ref 38–126)
ALT SERPL W P-5'-P-CCNC: 49 U/L (ref 9–52)
ANION GAP SERPL CALCULATED.3IONS-SCNC: 10 MMOL/L (ref 5–15)
AST SERPL-CCNC: 61 U/L (ref 14–36)
BACTERIA SPEC RESP CULT: NORMAL
BILIRUB SERPL-MCNC: 1.1 MG/DL (ref 0.2–1.3)
BUN BLD-MCNC: 5 MG/DL (ref 7–21)
BUN/CREAT SERPL: 7.8 (ref 7–25)
CALCIUM SPEC-SCNC: 8.3 MG/DL (ref 8.4–10.2)
CHLORIDE SERPL-SCNC: 107 MMOL/L (ref 95–110)
CO2 SERPL-SCNC: 24 MMOL/L (ref 22–31)
CREAT BLD-MCNC: 0.64 MG/DL (ref 0.5–1)
DEPRECATED RDW RBC AUTO: 48.2 FL (ref 36.4–46.3)
ERYTHROCYTE [DISTWIDTH] IN BLOOD BY AUTOMATED COUNT: 14.3 % (ref 11.5–14.5)
GFR SERPL CREATININE-BSD FRML MDRD: 101 ML/MIN/1.73 (ref 58–135)
GLOBULIN UR ELPH-MCNC: 2.9 GM/DL (ref 2.3–3.5)
GLUCOSE BLD-MCNC: 113 MG/DL (ref 60–100)
GRAM STN SPEC: NORMAL
HCT VFR BLD AUTO: 38.9 % (ref 35–45)
HGB BLD-MCNC: 12.8 G/DL (ref 12–15.5)
MCH RBC QN AUTO: 30.4 PG (ref 26.5–34)
MCHC RBC AUTO-ENTMCNC: 32.9 G/DL (ref 31.4–36)
MCV RBC AUTO: 92.4 FL (ref 80–98)
PLATELET # BLD AUTO: 188 10*3/MM3 (ref 150–450)
PMV BLD AUTO: 9.7 FL (ref 8–12)
POTASSIUM BLD-SCNC: 3.8 MMOL/L (ref 3.5–5.1)
PROT SERPL-MCNC: 6.2 G/DL (ref 6.3–8.6)
RBC # BLD AUTO: 4.21 10*6/MM3 (ref 3.77–5.16)
SODIUM BLD-SCNC: 141 MMOL/L (ref 137–145)
WBC NRBC COR # BLD: 12.62 10*3/MM3 (ref 3.2–9.8)

## 2017-04-11 PROCEDURE — 99291 CRITICAL CARE FIRST HOUR: CPT | Performed by: INTERNAL MEDICINE

## 2017-04-11 PROCEDURE — 99232 SBSQ HOSP IP/OBS MODERATE 35: CPT | Performed by: FAMILY MEDICINE

## 2017-04-11 PROCEDURE — 94640 AIRWAY INHALATION TREATMENT: CPT

## 2017-04-11 PROCEDURE — 80053 COMPREHEN METABOLIC PANEL: CPT | Performed by: FAMILY MEDICINE

## 2017-04-11 PROCEDURE — 85027 COMPLETE CBC AUTOMATED: CPT | Performed by: FAMILY MEDICINE

## 2017-04-11 PROCEDURE — 94799 UNLISTED PULMONARY SVC/PX: CPT

## 2017-04-11 RX ORDER — ONDANSETRON 4 MG/1
4 TABLET, FILM COATED ORAL EVERY 6 HOURS PRN
Status: DISCONTINUED | OUTPATIENT
Start: 2017-04-11 | End: 2017-04-12 | Stop reason: HOSPADM

## 2017-04-11 RX ORDER — ALBUTEROL SULFATE 2.5 MG/3ML
2.5 SOLUTION RESPIRATORY (INHALATION) EVERY 6 HOURS PRN
Status: DISCONTINUED | OUTPATIENT
Start: 2017-04-11 | End: 2017-04-12 | Stop reason: HOSPADM

## 2017-04-11 RX ORDER — ACETAMINOPHEN 325 MG/1
650 TABLET ORAL EVERY 6 HOURS PRN
Status: DISCONTINUED | OUTPATIENT
Start: 2017-04-11 | End: 2017-04-12 | Stop reason: HOSPADM

## 2017-04-11 RX ORDER — CLINDAMYCIN HYDROCHLORIDE 150 MG/1
450 CAPSULE ORAL EVERY 8 HOURS SCHEDULED
Status: DISCONTINUED | OUTPATIENT
Start: 2017-04-11 | End: 2017-04-12 | Stop reason: HOSPADM

## 2017-04-11 RX ORDER — BENZONATATE 100 MG/1
100 CAPSULE ORAL 3 TIMES DAILY PRN
Status: DISCONTINUED | OUTPATIENT
Start: 2017-04-11 | End: 2017-04-12 | Stop reason: HOSPADM

## 2017-04-11 RX ADMIN — Medication 10 ML: at 17:55

## 2017-04-11 RX ADMIN — CLINDAMYCIN HYDROCHLORIDE 450 MG: 150 CAPSULE ORAL at 21:25

## 2017-04-11 RX ADMIN — ACETAMINOPHEN 650 MG: 325 TABLET ORAL at 23:32

## 2017-04-11 RX ADMIN — ONDANSETRON 4 MG: 4 TABLET, FILM COATED ORAL at 16:16

## 2017-04-11 RX ADMIN — CLINDAMYCIN HYDROCHLORIDE 450 MG: 150 CAPSULE ORAL at 17:52

## 2017-04-11 RX ADMIN — BENZONATATE 100 MG: 100 CAPSULE ORAL at 23:33

## 2017-04-11 RX ADMIN — POTASSIUM CHLORIDE, DEXTROSE MONOHYDRATE AND SODIUM CHLORIDE 100 ML/HR: 150; 5; 450 INJECTION, SOLUTION INTRAVENOUS at 05:32

## 2017-04-11 RX ADMIN — ALBUTEROL SULFATE 2.5 MG: 2.5 SOLUTION RESPIRATORY (INHALATION) at 21:30

## 2017-04-11 NOTE — PLAN OF CARE
Problem: Patient Care Overview (Adult)  Goal: Plan of Care Review  Outcome: Ongoing (interventions implemented as appropriate)    04/11/17 0535   Coping/Psychosocial Response Interventions   Plan Of Care Reviewed With patient   Patient Care Overview   Progress improving   Outcome Evaluation   Outcome Summary/Follow up Plan Pt drowsy, but easily awakens to verbal stimuli. Follows commands and responds to questions appropriately. Hernandez removed, vitals stable. Will continue to monitor.       Goal: Discharge Needs Assessment  Outcome: Ongoing (interventions implemented as appropriate)    Problem: Fall Risk (Adult)  Goal: Absence of Falls  Outcome: Ongoing (interventions implemented as appropriate)

## 2017-04-11 NOTE — NURSING NOTE
Messenger and security took pt up to room 403 with telemetry. No distress. All belongings with pt.

## 2017-04-11 NOTE — PROGRESS NOTES
CRITICAL CARE PROGRESS NOTE  Mariana Shannon MD    [facility] CRITICAL CARE  4/11/2017        Rozina Mccartney  [id number]  1970  46 y.o. female            LOS: 2 days   Sandra Dominguez MD    Chief Complaint/Reason for visit: F/u respiratory failure after OD    Subjective     Interval History:   History taken from: patient/ chart    Extubated yesterday once sitter available. Stable on 3lpm with sats in upper 90's. Denies dyspnea but still with productive cough. Remains NPO.    Review of Systems:   Review of Systems   HENT: Positive for sore throat.    Respiratory: Positive for cough. Negative for shortness of breath and wheezing.    Cardiovascular: Negative for chest pain.   Psychiatric/Behavioral: Positive for dysphoric mood.     All systems were reviewed and negative except as noted above in the HPI.    Medical history, surgical history, social history, family history reviewed    Objective     Intake/Output:    Intake/Output Summary (Last 24 hours) at 04/11/17 0711  Last data filed at 04/11/17 0600   Gross per 24 hour   Intake             2484 ml   Output             1750 ml   Net              734 ml       Nutrition: NPO    Infusions:       Respiratory:  Vent Mode: PS  FiO2 (%):  [30 %] 30 %  PEEP/CPAP (cm H2O):  [5 cm H20] 5 cm H20  AR SUP:  [10 cm H20] 10 cm H20  MAP (cm H2O):  [8.9-9.5] 8.9    Vital Sign Min/Max for last 24 hours:  Temp  Min: 98 °F (36.7 °C)  Max: 98.5 °F (36.9 °C)   BP  Min: 104/69  Max: 141/81   Pulse  Min: 79  Max: 101   Resp  Min: 16  Max: 18   SpO2  Min: 93 %  Max: 100 %   Flow (L/min)  Min: 3  Max: 3   Weight  Min: 216 lb 7.9 oz (98.2 kg)  Max: 216 lb 7.9 oz (98.2 kg)     Physical Exam:  98.5 °F (36.9 °C) (Oral) 81 115/62 16 94% 216 lb 7.9 oz (98.2 kg) Body mass index is 37.16 kg/(m^2).  Physical Exam   Constitutional: She is oriented to person, place, and time. Vital signs are normal. She appears well-developed and well-nourished. She is sleeping. She is easily  aroused.   obese   HENT:   Head: Normocephalic and atraumatic.   Nose: Nose normal.   Mouth/Throat: Mucous membranes are normal.   Eyes: EOM are normal.   Cardiovascular: Normal rate, regular rhythm and normal heart sounds.  Exam reveals no gallop.    No murmur heard.  Pulmonary/Chest: Effort normal and breath sounds normal. No respiratory distress. She has no wheezes. She has no rhonchi. She has no rales.   Abdominal: Soft. Normal appearance and bowel sounds are normal. There is no tenderness.       Vascular Status -  Her exam exhibits no right foot edema. Her exam exhibits no left foot edema.  Neurological: She is oriented to person, place, and time and easily aroused.   Skin: Skin is warm and dry. No cyanosis. Nails show no clubbing.   Psychiatric: She has a normal mood and affect. Her behavior is normal. Judgment normal. Cognition and memory are normal.       Central Lines/PICC: present     Results Review:  I personally reviewed the patient's new clinical results.   Lab Results (last 24 hours)     Procedure Component Value Units Date/Time    Troponin [[id number]]  (Normal) Collected:  04/10/17 0929    Specimen:  Blood Updated:  04/10/17 1014     Troponin I <0.012 ng/mL     Blood Culture [[id number]]  (Normal) Collected:  04/09/17 2102    Specimen:  Blood from Arm, Left Updated:  04/10/17 2201     Blood Culture No growth at 24 hours    Blood Culture [[id number]]  (Normal) Collected:  04/09/17 2112    Specimen:  Blood from Wrist, Left Updated:  04/10/17 2201     Blood Culture No growth at 24 hours    Respiratory Culture [[id number]] Collected:  04/09/17 1638    Specimen:  Sputum from ET Suction Updated:  04/11/17 0649     Respiratory Culture Normal Respiratory Donny     Gram Stain Result Many (4+) WBCs per low power field      Rare (1+) Epithelial cells per low power field      Mixed bacterial donny          Results from last 7 days  Lab Units 04/10/17  0456   PH, ARTERIAL pH units 7.466*   PO2 ART mm Hg 71.8*   PCO2,  ARTERIAL mm Hg 35.8   HCO3 ART mmol/L 25.2     Lab Results   Component Value Date    BLOODCX No growth at 24 hours 04/09/2017     No results found for: URINECX    I independently reviewed the patient's new imaging, including images and reports.  Imaging Results (last 24 hours)     Procedure Component Value Units Date/Time    US Guided Vascular Access [[id number]] Resulted:  04/10/17 1343     Updated:  04/10/17 1343    Narrative:       This procedure was auto-finalized with no dictation required.    IR PICC wo fluoro guidance [[id number]] Resulted:  04/10/17 1347     Updated:  04/10/17 1347    Narrative:       This procedure was auto-finalized with no dictation required.    XR Chest Post CVA Port [[id number]] Collected:  04/10/17 1439     Updated:  04/10/17 1446    Narrative:       [facility] [place]    Patient Name: MS. ISAEL FELDER    ORDERING: DEMOND SOLORIO     ATTENDING: DEMOND SOLORIO     REFERRING: DEMOND SOLORIO    -----------------------    PROCEDURE: Portable chest x-ray    TECHNIQUE: Single AP view of the chest    COMPARISON: 4/10/2017    HISTORY: PICC placement, T50.902A Poisoning by unspecified drugs,  medicaments and biological substances, intentional self-harm,  initial encounter R40.0 Somnolence T14.91 Suicide attempt    FINDINGS:     Life-support devices: Endotracheal tube, enteric tube, stable in  position. Right upper extremity PICC terminates in the SVC.    Lungs/pleura: Opacity in the left lung base peripherally,  possibly due to atelectasis, pneumonia, and/or pleural effusion.  Lungs otherwise appear clear. No pneumothorax.    Heart, hilar and mediastinal structures:  Normal accounting for  projection and technique      Impression:       CONCLUSION:  Opacity in the left lung base peripherally, possibly due to  atelectasis, pneumonia, and/or pleural effusion.    Electronically signed by:  Suresh Lewis MD  4/10/2017 2:44 PM CDT  Workstation: [id number]          All medications  reviewed.     clindamycin 600 mg Intramuscular Q8H   enoxaparin 40 mg Subcutaneous Daily   sodium chloride 500 mL Intravenous Once         Assessment/Plan     ASSESSMENT:  # Intentional overdose- elavil and gabapentin  # Acute hypoxic respiratory failure due to above  # Probable aspiration pneumonitis  # Anxiety/depression  # Hypertension  # Hepatitis C      PLAN:  -Wean O2 off to keep sats >90%. Turned down to 2lpm with sats stable at 98%  -Cont empiric clinda for suspected aspiration pneumonitis  -Albuterol prn  -Stop IVFs  -Regular diet  -Suicide precautions  - consult per primary    VTE Prophylaxis:Lovenox    Stable for transfer out of ICU per primary team. I will sign off. Please call with questions or if I can be of further assistance.       Critical Care Time Spent: 20 minutes  I personally provided care to this critically ill patient as documented above.  Critical care time does not include time spent on separately billed procedures.  None of my critical care time was concurrent with other critical care providers.         This document has been electronically signed by Mariana Shannon MD on April 11, 2017 7:11 AM      [phone]    EMR Dragon/Transcription disclaimer:     Much of this encounter note is an electronic transcription/translation of spoken language to printed text. The electronic translation of spoken language may permit erroneous, or at times, nonsensical words or phrases to be inadvertently transcribed; Although I have reviewed the note for such errors, some may still exist.

## 2017-04-11 NOTE — PROGRESS NOTES
FAMILY MEDICINE DAILY PROGRESS NOTE  NAME: Rozina Mccartney  : 1970  MRN: [id number]     LOS: 2 days     PROVIDER OF SERVICE: Sandra Dominguez MD    Chief Complaint: Intentional overdose with gabapentin and elavil    Subjective:     Interval History:  History taken from: chart RN, patient   Patient Complaints: cough  Patient Denies:  SOA, CP     Patient successfully extubated yesterday afternoon.  She is tolerating it well.  Weaning oxygen.  No acute events overnight. Patient currently denying SI/HI; however admits to several increasing stressors in her life. When asked about the medication in her possession but not in her name; she states it belongs to her brother whom is in [place].        Review of Systems:   Review of Systems   Constitutional: Negative for activity change, appetite change, chills and fever.   HENT: Negative for congestion and sinus pressure.    Respiratory: Positive for cough. Negative for shortness of breath and wheezing.    Cardiovascular: Negative for chest pain, palpitations and leg swelling.   Gastrointestinal: Negative for abdominal pain, constipation, diarrhea, nausea and vomiting.   Genitourinary: Negative for dysuria.   Skin: Negative for rash and wound.   Neurological: Negative for light-headedness and headaches.   Psychiatric/Behavioral: Positive for dysphoric mood. Negative for self-injury and suicidal ideas. The patient is nervous/anxious.        Objective:     Vital Signs  Temp:  [98 °F (36.7 °C)-98.5 °F (36.9 °C)] 98.5 °F (36.9 °C)  Heart Rate:  [] 81  Resp:  [16-18] 16  BP: (104-141)/(58-85) 115/62  FiO2 (%):  [30 %] 30 %    Physical Exam  Physical Exam   Constitutional: She is oriented to person, place, and time. She appears well-developed and well-nourished. No distress.   HENT:   Head: Normocephalic and atraumatic.   Nose: Nose normal.   Eyes: EOM are normal. Pupils are equal, round, and reactive to light. Right eye exhibits no discharge. Left eye exhibits no  discharge.   Neck: No tracheal deviation present.   Cardiovascular: Normal rate, regular rhythm, normal heart sounds and intact distal pulses.    Pulmonary/Chest: Effort normal and breath sounds normal. No stridor. No respiratory distress. She has no wheezes.   Abdominal: Soft. Bowel sounds are normal. She exhibits no distension. There is no tenderness.   Musculoskeletal: She exhibits no edema.   Lymphadenopathy:     She has no cervical adenopathy.   Neurological: She is alert and oriented to person, place, and time. No cranial nerve deficit. She exhibits normal muscle tone. Coordination normal.   Skin: Skin is warm and dry. No rash noted. She is not diaphoretic. No erythema.   Psychiatric: Her behavior is normal. Judgment and thought content normal.   Flat affect    Nursing note and vitals reviewed.      Medication Review    Current Facility-Administered Medications:   •  albuterol (PROVENTIL) nebulizer solution 0.083% 2.5 mg/3mL, 2.5 mg, Nebulization, Q6H PRN, Mariana Shannon MD  •  clindamycin (CLEOCIN) injection 600 mg, 600 mg, Intramuscular, Q8H, Sandra Dominguez MD, 600 mg at 04/10/17 1538  •  enoxaparin (LOVENOX) syringe 40 mg, 40 mg, Subcutaneous, Daily, Sandra Dominguez MD, 40 mg at 04/10/17 0848  •  sodium chloride 0.9 % flush 1-10 mL, 1-10 mL, Intravenous, PRN, Sandra Dominguez MD  •  sodium chloride 0.9 % flush 10 mL, 10 mL, Intravenous, PRN, Ozzy Angel MD, 10 mL at 04/10/17 0848     Diagnostic Data    Lab Results (last 24 hours)     Procedure Component Value Units Date/Time    Troponin [[id number]]  (Normal) Collected:  04/10/17 0929    Specimen:  Blood Updated:  04/10/17 1014     Troponin I <0.012 ng/mL     Blood Culture [[id number]]  (Normal) Collected:  04/09/17 2102    Specimen:  Blood from Arm, Left Updated:  04/10/17 2201     Blood Culture No growth at 24 hours    Blood Culture [[id number]]  (Normal) Collected:  04/09/17 2112    Specimen:  Blood from Wrist, Left Updated:  04/10/17  2201     Blood Culture No growth at 24 hours    Respiratory Culture [[id number]] Collected:  04/09/17 1638    Specimen:  Sputum from ET Suction Updated:  04/11/17 0649     Respiratory Culture Normal Respiratory Donny     Gram Stain Result Many (4+) WBCs per low power field      Rare (1+) Epithelial cells per low power field      Mixed bacterial donny            I reviewed the patient's new clinical results.    Assessment/Plan:     Active Hospital Problems (** Indicates Principal Problem)    Diagnosis Date Noted   • Aspiration pneumonia [J69.0] 04/11/2017     Will continue with Clindamycin  Will change to PO      • Overdose [T50.901A] 04/09/2017     Patient extubated  UDS positive for amphetamines  Patient reported taking gabapentin and Elavil per EMS   Will maintain on cardiac monitor      • Suicidal overdose [T50.902A] 04/09/2017     Will obtain a psych consult today     • Atelectasis of left lung [J98.11] 04/09/2017     Possibly due to improper ET placement  ET repositioned in ED   CT scan review shows atelectasis, possible infiltrate.    Will treat for possible aspiration PNA with Clinda IV     • Obesity due to excess calories [E66.09] 04/09/2017   • Chronic hepatitis C virus infection [B18.2] 04/09/2017     Will monitor LFTs         Resolved Hospital Problems    Diagnosis Date Noted Date Resolved   No resolved problems to display.       DVT prophylaxis: Lovenox  Code status is Full Code    Plan for disposition: Transfer to medical floor, psych consult       Time: Critical care 30 min           This document has been electronically signed by Sandra Dominguez MD on April 11, 2017 7:37 AM

## 2017-04-11 NOTE — PROGRESS NOTES
"     LOS: 2 days   Patient Care Team:  No Known Provider as PCP - General    Subjective      No c/o. Feels better. Extubated. Does not remember suicide attempt or drug OD. States she did not want to die. \"All my problems started with an argument\".    Interval History:     Review of Systems:    Review of Systems   Constitutional: Negative for activity change, appetite change, fatigue and fever.   HENT: Negative for ear pain and sore throat.    Eyes: Negative for pain and visual disturbance.   Respiratory: Negative for cough and shortness of breath.    Cardiovascular: Negative for chest pain and palpitations.   Gastrointestinal: Negative for abdominal pain and nausea.   Endocrine: Negative for cold intolerance and heat intolerance.   Genitourinary: Negative for difficulty urinating and dysuria.   Musculoskeletal: Negative for arthralgias and gait problem.   Skin: Negative for color change and rash.   Neurological: Negative for dizziness, weakness and headaches.   Hematological: Negative for adenopathy. Does not bruise/bleed easily.   Psychiatric/Behavioral: Positive for dysphoric mood. Negative for agitation, confusion, self-injury, sleep disturbance and suicidal ideas.       Objective     Vital Signs  Temp:  [98 °F (36.7 °C)-98.9 °F (37.2 °C)] 98.9 °F (37.2 °C)  Heart Rate:  [] 76  Resp:  [16-18] 18  BP: (104-136)/(58-85) 136/79  FiO2 (%):  [30 %] 30 %    Physical Exam:   Physical Exam   Constitutional: She is oriented to person, place, and time. She appears well-developed and well-nourished.   HENT:   Head: Normocephalic and atraumatic.   Eyes: Conjunctivae and EOM are normal.   Neck: Normal range of motion. Neck supple.   Cardiovascular: Normal rate, regular rhythm and normal heart sounds.    Pulmonary/Chest: Effort normal and breath sounds normal.   Abdominal: Soft. Bowel sounds are normal. She exhibits no distension. There is no tenderness.   Musculoskeletal: Normal range of motion.   Neurological: She is " alert and oriented to person, place, and time.   Skin: Skin is warm and dry.   Psychiatric: Her speech is normal and behavior is normal. Judgment normal. Her affect is blunt. Cognition and memory are normal. She expresses no suicidal plans and no homicidal plans.   Nursing note and vitals reviewed.       Results Review:       Lab Results (last 24 hours)     Procedure Component Value Units Date/Time    Troponin [[id number]]  (Normal) Collected:  04/10/17 0929    Specimen:  Blood Updated:  04/10/17 1014     Troponin I <0.012 ng/mL     Blood Culture [[id number]]  (Normal) Collected:  04/09/17 2102    Specimen:  Blood from Arm, Left Updated:  04/10/17 2201     Blood Culture No growth at 24 hours    Blood Culture [[id number]]  (Normal) Collected:  04/09/17 2112    Specimen:  Blood from Wrist, Left Updated:  04/10/17 2201     Blood Culture No growth at 24 hours    Respiratory Culture [[id number]] Collected:  04/09/17 1638    Specimen:  Sputum from ET Suction Updated:  04/11/17 0649     Respiratory Culture Normal Respiratory Donny     Gram Stain Result Many (4+) WBCs per low power field      Rare (1+) Epithelial cells per low power field      Mixed bacterial donny    Comprehensive Metabolic Panel [[id number]] Collected:  04/11/17 0831    Specimen:  Blood Updated:  04/11/17 0839    CBC (No Diff) [[id number]]  (Abnormal) Collected:  04/11/17 0831    Specimen:  Blood Updated:  04/11/17 0844     WBC 12.62 (H) 10*3/mm3      RBC 4.21 10*6/mm3      Hemoglobin 12.8 g/dL      Hematocrit 38.9 %      MCV 92.4 fL      MCH 30.4 pg      MCHC 32.9 g/dL      RDW 14.3 %      RDW-SD 48.2 (H) fl      MPV 9.7 fL      Platelets 188 10*3/mm3           Medication Review:   Current Facility-Administered Medications   Medication Dose Route Frequency Provider Last Rate Last Dose   • albuterol (PROVENTIL) nebulizer solution 0.083% 2.5 mg/3mL  2.5 mg Nebulization Q6H PRN Mariana Shannon MD       • clindamycin (CLEOCIN) injection 600 mg  600 mg Intramuscular Q8H  Sandra Dominguez MD   600 mg at 04/10/17 1538   • enoxaparin (LOVENOX) syringe 40 mg  40 mg Subcutaneous Daily Sandra Dominguez MD   40 mg at 04/10/17 0848   • sodium chloride 0.9 % flush 1-10 mL  1-10 mL Intravenous PRN Sandra Dominguez MD       • sodium chloride 0.9 % flush 10 mL  10 mL Intravenous PRN Ozzy Angel MD   10 mL at 04/10/17 0848       Assessment/Plan     Active Problems:    Overdose    Suicidal overdose    Atelectasis of left lung    Obesity due to excess calories    Chronic hepatitis C virus infection    Aspiration pneumonia    Plan    To floor when able.  Psych consult.    I have examined the patient and reviewed the pertinent diagnostic data. I have discussed the case with the Family Medicine Resident and agree with the assessment and plan of care.    Leo Guthrie DO           This document has been electronically signed by Leo Guthrie DO on April 11, 2017 9:12 AM

## 2017-04-11 NOTE — NURSING NOTE
Pt to room 403, pt denies SI/HI. States does not wish to harm self or others. Radha @ BS. PICC line locked, room air, in NAD.

## 2017-04-12 ENCOUNTER — HOSPITAL ENCOUNTER (INPATIENT)
Facility: HOSPITAL | Age: 47
LOS: 5 days | Discharge: HOME | End: 2017-04-17
Attending: PSYCHIATRY & NEUROLOGY | Admitting: PSYCHIATRY & NEUROLOGY

## 2017-04-12 VITALS
OXYGEN SATURATION: 94 % | WEIGHT: 214.25 LBS | BODY MASS INDEX: 39.43 KG/M2 | HEIGHT: 62 IN | SYSTOLIC BLOOD PRESSURE: 128 MMHG | HEART RATE: 70 BPM | RESPIRATION RATE: 18 BRPM | TEMPERATURE: 98 F | DIASTOLIC BLOOD PRESSURE: 82 MMHG

## 2017-04-12 PROBLEM — J69.0 ASPIRATION PNEUMONIA (HCC): Status: RESOLVED | Noted: 2017-04-11 | Resolved: 2017-04-12

## 2017-04-12 PROBLEM — F15.20: Status: ACTIVE | Noted: 2017-04-12

## 2017-04-12 PROBLEM — T50.902A SUICIDAL OVERDOSE (HCC): Status: RESOLVED | Noted: 2017-04-09 | Resolved: 2017-04-12

## 2017-04-12 PROBLEM — T50.901A OVERDOSE: Status: RESOLVED | Noted: 2017-04-09 | Resolved: 2017-04-12

## 2017-04-12 PROBLEM — J98.11 ATELECTASIS OF LEFT LUNG: Status: RESOLVED | Noted: 2017-04-09 | Resolved: 2017-04-12

## 2017-04-12 PROBLEM — R45.851 SUICIDAL IDEATION: Status: ACTIVE | Noted: 2017-04-12

## 2017-04-12 LAB
ALBUMIN SERPL-MCNC: 3.2 G/DL (ref 3.4–4.8)
ALBUMIN/GLOB SERPL: 1.1 G/DL (ref 1.1–1.8)
ALP SERPL-CCNC: 78 U/L (ref 38–126)
ALT SERPL W P-5'-P-CCNC: 49 U/L (ref 9–52)
ANION GAP SERPL CALCULATED.3IONS-SCNC: 10 MMOL/L (ref 5–15)
AST SERPL-CCNC: 51 U/L (ref 14–36)
BILIRUB SERPL-MCNC: 0.4 MG/DL (ref 0.2–1.3)
BUN BLD-MCNC: 10 MG/DL (ref 7–21)
BUN/CREAT SERPL: 14.1 (ref 7–25)
CALCIUM SPEC-SCNC: 8.3 MG/DL (ref 8.4–10.2)
CHLORIDE SERPL-SCNC: 105 MMOL/L (ref 95–110)
CO2 SERPL-SCNC: 25 MMOL/L (ref 22–31)
CREAT BLD-MCNC: 0.71 MG/DL (ref 0.5–1)
DEPRECATED RDW RBC AUTO: 45 FL (ref 36.4–46.3)
ERYTHROCYTE [DISTWIDTH] IN BLOOD BY AUTOMATED COUNT: 13.6 % (ref 11.5–14.5)
GFR SERPL CREATININE-BSD FRML MDRD: 89 ML/MIN/1.73 (ref 60–135)
GLOBULIN UR ELPH-MCNC: 3 GM/DL (ref 2.3–3.5)
GLUCOSE BLD-MCNC: 95 MG/DL (ref 60–100)
HCT VFR BLD AUTO: 38.6 % (ref 35–45)
HGB BLD-MCNC: 12.7 G/DL (ref 12–15.5)
MCH RBC QN AUTO: 30 PG (ref 26.5–34)
MCHC RBC AUTO-ENTMCNC: 32.9 G/DL (ref 31.4–36)
MCV RBC AUTO: 91.3 FL (ref 80–98)
PLATELET # BLD AUTO: 201 10*3/MM3 (ref 150–450)
PMV BLD AUTO: 10.1 FL (ref 8–12)
POTASSIUM BLD-SCNC: 3.7 MMOL/L (ref 3.5–5.1)
PROT SERPL-MCNC: 6.2 G/DL (ref 6.3–8.6)
RBC # BLD AUTO: 4.23 10*6/MM3 (ref 3.77–5.16)
SODIUM BLD-SCNC: 140 MMOL/L (ref 137–145)
WBC NRBC COR # BLD: 10.89 10*3/MM3 (ref 3.2–9.8)

## 2017-04-12 PROCEDURE — 85027 COMPLETE CBC AUTOMATED: CPT | Performed by: FAMILY MEDICINE

## 2017-04-12 PROCEDURE — 99238 HOSP IP/OBS DSCHRG MGMT 30/<: CPT | Performed by: FAMILY MEDICINE

## 2017-04-12 PROCEDURE — 80053 COMPREHEN METABOLIC PANEL: CPT | Performed by: FAMILY MEDICINE

## 2017-04-12 RX ORDER — TRAZODONE HYDROCHLORIDE 50 MG/1
50 TABLET ORAL NIGHTLY PRN
Status: DISCONTINUED | OUTPATIENT
Start: 2017-04-12 | End: 2017-04-17 | Stop reason: HOSPADM

## 2017-04-12 RX ORDER — DOCUSATE SODIUM 100 MG/1
100 CAPSULE, LIQUID FILLED ORAL 2 TIMES DAILY PRN
Status: DISCONTINUED | OUTPATIENT
Start: 2017-04-12 | End: 2017-04-17 | Stop reason: HOSPADM

## 2017-04-12 RX ORDER — NICOTINE 21 MG/24HR
1 PATCH, TRANSDERMAL 24 HOURS TRANSDERMAL EVERY 24 HOURS
Status: DISCONTINUED | OUTPATIENT
Start: 2017-04-12 | End: 2017-04-17 | Stop reason: HOSPADM

## 2017-04-12 RX ORDER — CLINDAMYCIN HYDROCHLORIDE 150 MG/1
450 CAPSULE ORAL EVERY 8 HOURS SCHEDULED
Status: COMPLETED | OUTPATIENT
Start: 2017-04-12 | End: 2017-04-16

## 2017-04-12 RX ORDER — GABAPENTIN 800 MG/1
800 TABLET ORAL 4 TIMES DAILY
Status: ON HOLD | COMMUNITY
End: 2017-05-09

## 2017-04-12 RX ORDER — AMITRIPTYLINE HYDROCHLORIDE 50 MG/1
50 TABLET, FILM COATED ORAL NIGHTLY
COMMUNITY
End: 2017-04-17 | Stop reason: HOSPADM

## 2017-04-12 RX ORDER — MAGNESIUM HYDROXIDE/ALUMINUM HYDROXICE/SIMETHICONE 120; 1200; 1200 MG/30ML; MG/30ML; MG/30ML
30 SUSPENSION ORAL EVERY 6 HOURS PRN
Status: DISCONTINUED | OUTPATIENT
Start: 2017-04-12 | End: 2017-04-17 | Stop reason: HOSPADM

## 2017-04-12 RX ORDER — ACETAMINOPHEN 325 MG/1
650 TABLET ORAL EVERY 4 HOURS PRN
Status: DISCONTINUED | OUTPATIENT
Start: 2017-04-12 | End: 2017-04-17 | Stop reason: HOSPADM

## 2017-04-12 RX ORDER — CLONIDINE HYDROCHLORIDE 0.1 MG/1
0.1 TABLET ORAL EVERY 4 HOURS PRN
Status: DISCONTINUED | OUTPATIENT
Start: 2017-04-12 | End: 2017-04-17 | Stop reason: HOSPADM

## 2017-04-12 RX ORDER — DIPHENOXYLATE HYDROCHLORIDE AND ATROPINE SULFATE 2.5; .025 MG/1; MG/1
1 TABLET ORAL ONCE
Status: COMPLETED | OUTPATIENT
Start: 2017-04-12 | End: 2017-04-12

## 2017-04-12 RX ORDER — TEMAZEPAM 15 MG/1
15 CAPSULE ORAL DAILY
COMMUNITY
End: 2017-04-17 | Stop reason: HOSPADM

## 2017-04-12 RX ORDER — CLINDAMYCIN HYDROCHLORIDE 150 MG/1
450 CAPSULE ORAL EVERY 8 HOURS SCHEDULED
Qty: 36 CAPSULE | Refills: 0 | Status: SHIPPED | OUTPATIENT
Start: 2017-04-12 | End: 2017-04-17 | Stop reason: HOSPADM

## 2017-04-12 RX ORDER — PAROXETINE 10 MG/1
40 TABLET, FILM COATED ORAL EVERY MORNING
COMMUNITY
End: 2017-04-17 | Stop reason: HOSPADM

## 2017-04-12 RX ORDER — HYDROXYZINE PAMOATE 50 MG/1
50 CAPSULE ORAL EVERY 6 HOURS PRN
Status: DISCONTINUED | OUTPATIENT
Start: 2017-04-12 | End: 2017-04-17 | Stop reason: HOSPADM

## 2017-04-12 RX ORDER — LOPERAMIDE HYDROCHLORIDE 2 MG/1
2 CAPSULE ORAL 4 TIMES DAILY PRN
Status: DISCONTINUED | OUTPATIENT
Start: 2017-04-12 | End: 2017-04-17 | Stop reason: HOSPADM

## 2017-04-12 RX ADMIN — HYDROXYZINE PAMOATE 50 MG: 50 CAPSULE ORAL at 20:39

## 2017-04-12 RX ADMIN — CLINDAMYCIN HYDROCHLORIDE 450 MG: 150 CAPSULE ORAL at 20:40

## 2017-04-12 RX ADMIN — ACETAMINOPHEN 650 MG: 325 TABLET ORAL at 20:39

## 2017-04-12 RX ADMIN — CLINDAMYCIN HYDROCHLORIDE 450 MG: 150 CAPSULE ORAL at 05:24

## 2017-04-12 RX ADMIN — Medication 10 ML: at 10:51

## 2017-04-12 RX ADMIN — LOPERAMIDE HYDROCHLORIDE 2 MG: 2 CAPSULE ORAL at 20:39

## 2017-04-12 RX ADMIN — TRAZODONE HYDROCHLORIDE 50 MG: 50 TABLET ORAL at 20:39

## 2017-04-12 RX ADMIN — ONDANSETRON 4 MG: 4 TABLET, FILM COATED ORAL at 10:51

## 2017-04-12 RX ADMIN — ACETAMINOPHEN 650 MG: 325 TABLET ORAL at 10:50

## 2017-04-12 RX ADMIN — Medication 10 ML: at 10:50

## 2017-04-12 RX ADMIN — DIPHENOXYLATE HYDROCHLORIDE AND ATROPINE SULFATE 1 TABLET: 2.5; .025 TABLET ORAL at 11:21

## 2017-04-12 RX ADMIN — Medication 10 ML: at 05:24

## 2017-04-12 RX ADMIN — Medication 10 ML: at 10:52

## 2017-04-12 RX ADMIN — NICOTINE 1 PATCH: 21 PATCH TRANSDERMAL at 17:22

## 2017-04-12 NOTE — PLAN OF CARE
Problem: Patient Care Overview (Adult)  Goal: Plan of Care Review  Outcome: Ongoing (interventions implemented as appropriate)    04/12/17 0339   Coping/Psychosocial Response Interventions   Plan Of Care Reviewed With patient   Patient Care Overview   Progress improving   Outcome Evaluation   Outcome Summary/Follow up Plan Patient has been very pleasent and rested well tonight. Remains on 1:1 supervision at this time       Goal: Adult Individualization and Mutuality  Outcome: Ongoing (interventions implemented as appropriate)  Goal: Discharge Needs Assessment  Outcome: Ongoing (interventions implemented as appropriate)    Problem: Fall Risk (Adult)  Goal: Absence of Falls  Outcome: Ongoing (interventions implemented as appropriate)

## 2017-04-12 NOTE — PLAN OF CARE
Problem: BH Patient Care Overview (Adult)  Goal: Plan of Care Review  Outcome: Ongoing (interventions implemented as appropriate)    Problem: BH Overarching Goals  Goal: Adheres to Safety Considerations for Self and Others  Outcome: Ongoing (interventions implemented as appropriate)  Goal: Optimized Coping Skills in Response to Life Stressors  Outcome: Ongoing (interventions implemented as appropriate)  Goal: Develops/Participates in Therapeutic [place] to Support Successful Transition  Outcome: Ongoing (interventions implemented as appropriate)    Problem: Depression  Goal: Treatment Goal: Demonstrate behavioral control of depressive symptoms, verbalize feelings of improved mood/affect, and adopt new coping skills prior to discharge  Outcome: Ongoing (interventions implemented as appropriate)  Goal: Verbalize thoughts and feelings associated with:  Outcome: Ongoing (interventions implemented as appropriate)  Goal: Refrain from harming self  Outcome: Ongoing (interventions implemented as appropriate)  Goal: Refrain from isolation  Outcome: Ongoing (interventions implemented as appropriate)  Goal: Refrain from self-neglect  Outcome: Ongoing (interventions implemented as appropriate)  Goal: Attend and participate in unit activities, including therapeutic, recreational, and educational groups  Outcome: Ongoing (interventions implemented as appropriate)  Goal: Complete daily ADLs, including personal hygiene independently, as able  Outcome: Ongoing (interventions implemented as appropriate)    Problem: Risk for Self Injury/Neglect  Goal: Treatment Goal: Remain safe during length of stay, learn and adopt new coping skills, and be free of self-injurious ideation, impulses and acts at the time of discharge  Outcome: Ongoing (interventions implemented as appropriate)  Goal: Verbalize thoughts and feelings associated with:  Outcome: Ongoing (interventions implemented as appropriate)  Goal: Refrain from harming self  Outcome:  Ongoing (interventions implemented as appropriate)  Goal: Attend and participate in unit activities, including therapeutic, recreational, and educational groups  Outcome: Ongoing (interventions implemented as appropriate)  Goal: Recognize maladaptive responses and adopt new coping mechanisms  Outcome: Ongoing (interventions implemented as appropriate)  Goal: Complete daily ADLs, including personal hygiene independently, as able  Outcome: Ongoing (interventions implemented as appropriate)

## 2017-04-12 NOTE — PROGRESS NOTES
LOS: 3 days   Patient Care Team:  No Known Provider as PCP - General    Subjective      No C/O    Interval History:     Review of Systems:    Review of Systems   Constitutional: Negative for activity change, appetite change, fatigue and fever.   HENT: Negative for ear pain and sore throat.    Eyes: Negative for pain and visual disturbance.   Respiratory: Negative for cough and shortness of breath.    Cardiovascular: Negative for chest pain and palpitations.   Gastrointestinal: Negative for abdominal pain and nausea.   Endocrine: Negative for cold intolerance and heat intolerance.   Genitourinary: Negative for difficulty urinating and dysuria.   Musculoskeletal: Negative for arthralgias and gait problem.   Skin: Negative for color change and rash.   Neurological: Negative for dizziness, weakness and headaches.   Hematological: Negative for adenopathy. Does not bruise/bleed easily.   Psychiatric/Behavioral: Positive for dysphoric mood. Negative for agitation, confusion, self-injury, sleep disturbance and suicidal ideas.       Objective     Vital Signs  Temp:  [96.8 °F (36 °C)-99.6 °F (37.6 °C)] 98 °F (36.7 °C)  Heart Rate:  [66-99] 70  Resp:  [16-18] 18  BP: ()/(62-82) 128/82    Physical Exam:   Physical Exam   Constitutional: She is oriented to person, place, and time. She appears well-developed and well-nourished.   HENT:   Head: Normocephalic and atraumatic.   Eyes: Conjunctivae and EOM are normal.   Neck: Normal range of motion. Neck supple.   Cardiovascular: Normal rate, regular rhythm and normal heart sounds.    Pulmonary/Chest: Effort normal and breath sounds normal.   Abdominal: Soft. Bowel sounds are normal. She exhibits no distension. There is no tenderness.   Musculoskeletal: Normal range of motion.   Neurological: She is alert and oriented to person, place, and time.   Skin: Skin is warm and dry.   Psychiatric: Her speech is normal and behavior is normal. Judgment normal. Her affect is blunt.  Cognition and memory are normal. She expresses no suicidal plans and no homicidal plans.   Nursing note and vitals reviewed.       Results Review:       Lab Results (last 24 hours)     Procedure Component Value Units Date/Time    Blood Culture [[id number]]  (Normal) Collected:  04/09/17 2102    Specimen:  Blood from Arm, Left Updated:  04/11/17 2201     Blood Culture No growth at 2 days    Blood Culture [[id number]]  (Normal) Collected:  04/09/17 2112    Specimen:  Blood from Wrist, Left Updated:  04/11/17 2201     Blood Culture No growth at 2 days    CBC (No Diff) [[id number]]  (Abnormal) Collected:  04/12/17 0623    Specimen:  Blood Updated:  04/12/17 0655     WBC 10.89 (H) 10*3/mm3      RBC 4.23 10*6/mm3      Hemoglobin 12.7 g/dL      Hematocrit 38.6 %      MCV 91.3 fL      MCH 30.0 pg      MCHC 32.9 g/dL      RDW 13.6 %      RDW-SD 45.0 fl      MPV 10.1 fL      Platelets 201 10*3/mm3     Comprehensive Metabolic Panel [[id number]]  (Abnormal) Collected:  04/12/17 0623    Specimen:  Blood Updated:  04/12/17 0710     Glucose 95 mg/dL      BUN 10 mg/dL      Creatinine 0.71 mg/dL      Sodium 140 mmol/L      Potassium 3.7 mmol/L      Chloride 105 mmol/L      CO2 25.0 mmol/L      Calcium 8.3 (L) mg/dL      Total Protein 6.2 (L) g/dL      Albumin 3.20 (L) g/dL      ALT (SGPT) 49 U/L      AST (SGOT) 51 (H) U/L      Alkaline Phosphatase 78 U/L      Total Bilirubin 0.4 mg/dL      eGFR Non African Amer 89 mL/min/1.73      Globulin 3.0 gm/dL      A/G Ratio 1.1 g/dL      BUN/Creatinine Ratio 14.1     Anion Gap 10.0 mmol/L           Medication Review:   Current Facility-Administered Medications   Medication Dose Route Frequency Provider Last Rate Last Dose   • acetaminophen (TYLENOL) tablet 650 mg  650 mg Oral Q6H PRN Gina Gatica MD   650 mg at 04/12/17 1050   • albuterol (PROVENTIL) nebulizer solution 0.083% 2.5 mg/3mL  2.5 mg Nebulization Q6H PRN Mariana Shannon MD   2.5 mg at 04/11/17 2133   • benzonatate (TESSALON) capsule 100 mg   100 mg Oral TID PRN Gina Gatica MD   100 mg at 04/11/17 2333   • clindamycin (CLEOCIN) capsule 450 mg  450 mg Oral Q8H Sandra Dominguez MD   450 mg at 04/12/17 0524   • diphenoxylate-atropine (LOMOTIL) 2.5-0.025 MG per tablet 1 tablet  1 tablet Oral Once Sandra Dominguez MD       • enoxaparin (LOVENOX) syringe 40 mg  40 mg Subcutaneous Daily Sandra Dominguez MD   40 mg at 04/10/17 0848   • ondansetron (ZOFRAN) tablet 4 mg  4 mg Oral Q6H PRN Sandra Dominguez MD   4 mg at 04/12/17 1051   • sodium chloride 0.9 % flush 1-10 mL  1-10 mL Intravenous PRN Sandra Dominguez MD   10 mL at 04/12/17 1051   • sodium chloride 0.9 % flush 10 mL  10 mL Intravenous PRN Ozzy Angel MD   10 mL at 04/12/17 1052       Assessment/Plan     Active Problems:    Obesity due to excess calories    Chronic hepatitis C virus infection    Aspiration pneumonia    Plan    To psychiatry for further eval and treatment    I have examined the patient and reviewed the pertinent diagnostic data. I have discussed the case with the Family Medicine Resident and agree with the assessment and plan of care.    Leo Guthrie DO           This document has been electronically signed by Leo Guthrie DO on April 12, 2017 11:17 AM

## 2017-04-12 NOTE — PROGRESS NOTES
FAMILY MEDICINE DAILY PROGRESS NOTE  NAME: Rozina Mccartney  : 1970  MRN: [id number]     LOS: 3 days     PROVIDER OF SERVICE: Sandra Dominguez MD    Chief Complaint: Intentional overdose with gabapentin and elavil    Subjective:     Interval History:  History taken from: chart RN, patient   Patient Complaints: cough  Patient Denies:  SOA, CP     Patient has been stable on the medical floor. No further episodes of diarrhea. Patient agreeable to psych floor.       Review of Systems:   Review of Systems   Constitutional: Negative for activity change, appetite change, chills and fever.   HENT: Negative for congestion and sinus pressure.    Respiratory: Positive for cough. Negative for shortness of breath and wheezing.    Cardiovascular: Negative for chest pain, palpitations and leg swelling.   Gastrointestinal: Negative for abdominal pain, constipation, diarrhea, nausea and vomiting.   Genitourinary: Negative for dysuria.   Skin: Negative for rash and wound.   Neurological: Negative for light-headedness and headaches.   Psychiatric/Behavioral: Positive for dysphoric mood. Negative for self-injury and suicidal ideas. The patient is nervous/anxious.        Objective:     Vital Signs  Temp:  [96.8 °F (36 °C)-99.6 °F (37.6 °C)] 98 °F (36.7 °C)  Heart Rate:  [66-99] 70  Resp:  [14-18] 18  BP: ()/(59-82) 128/82    Physical Exam  Physical Exam   Constitutional: She is oriented to person, place, and time. She appears well-developed and well-nourished. No distress.   HENT:   Head: Normocephalic and atraumatic.   Nose: Nose normal.   Eyes: EOM are normal. Pupils are equal, round, and reactive to light. Right eye exhibits no discharge. Left eye exhibits no discharge.   Neck: No tracheal deviation present.   Cardiovascular: Normal rate, regular rhythm, normal heart sounds and intact distal pulses.    Pulmonary/Chest: Effort normal and breath sounds normal. No stridor. No respiratory distress. She has no wheezes.    Abdominal: Soft. Bowel sounds are normal. She exhibits no distension. There is no tenderness.   Musculoskeletal: She exhibits no edema.   Lymphadenopathy:     She has no cervical adenopathy.   Neurological: She is alert and oriented to person, place, and time. No cranial nerve deficit. She exhibits normal muscle tone. Coordination normal.   Skin: Skin is warm and dry. No rash noted. She is not diaphoretic. No erythema.   Psychiatric: Her behavior is normal. Judgment and thought content normal.   Flat affect    Nursing note and vitals reviewed.      Medication Review    Current Facility-Administered Medications:   •  acetaminophen (TYLENOL) tablet 650 mg, 650 mg, Oral, Q6H PRN, Gina Gatica MD, 650 mg at 04/11/17 2332  •  albuterol (PROVENTIL) nebulizer solution 0.083% 2.5 mg/3mL, 2.5 mg, Nebulization, Q6H PRN, Mariana Shannon MD, 2.5 mg at 04/11/17 2130  •  benzonatate (TESSALON) capsule 100 mg, 100 mg, Oral, TID PRN, Gina Gatica MD, 100 mg at 04/11/17 2333  •  clindamycin (CLEOCIN) capsule 450 mg, 450 mg, Oral, Q8H, Sandra Dominguez MD, 450 mg at 04/12/17 0524  •  enoxaparin (LOVENOX) syringe 40 mg, 40 mg, Subcutaneous, Daily, Sandra Dominguez MD, 40 mg at 04/10/17 0848  •  ondansetron (ZOFRAN) tablet 4 mg, 4 mg, Oral, Q6H PRN, Sandra Dominguez MD, 4 mg at 04/11/17 1616  •  sodium chloride 0.9 % flush 1-10 mL, 1-10 mL, Intravenous, PRN, Sandra Dominguez MD  •  sodium chloride 0.9 % flush 10 mL, 10 mL, Intravenous, PRN, Ozzy Angel MD, 10 mL at 04/12/17 0524     Diagnostic Data    Lab Results (last 24 hours)     Procedure Component Value Units Date/Time    CBC (No Diff) [[id number]]  (Abnormal) Collected:  04/11/17 0831    Specimen:  Blood Updated:  04/11/17 0844     WBC 12.62 (H) 10*3/mm3      RBC 4.21 10*6/mm3      Hemoglobin 12.8 g/dL      Hematocrit 38.9 %      MCV 92.4 fL      MCH 30.4 pg      MCHC 32.9 g/dL      RDW 14.3 %      RDW-SD 48.2 (H) fl      MPV 9.7 fL      Platelets 188 10*3/mm3      Comprehensive Metabolic Panel [[id number]]  (Abnormal) Collected:  04/11/17 0831    Specimen:  Blood Updated:  04/11/17 0911     Glucose 113 (H) mg/dL      BUN 5 (L) mg/dL      Creatinine 0.64 mg/dL      Sodium 141 mmol/L      Potassium 3.8 mmol/L      Chloride 107 mmol/L      CO2 24.0 mmol/L      Calcium 8.3 (L) mg/dL      Total Protein 6.2 (L) g/dL      Albumin 3.30 (L) g/dL      ALT (SGPT) 49 U/L      AST (SGOT) 61 (H) U/L      Alkaline Phosphatase 88 U/L      Total Bilirubin 1.1 mg/dL      eGFR Non African Amer 101 mL/min/1.73      Globulin 2.9 gm/dL      A/G Ratio 1.1 g/dL      BUN/Creatinine Ratio 7.8     Anion Gap 10.0 mmol/L     Blood Culture [[id number]]  (Normal) Collected:  04/09/17 2102    Specimen:  Blood from Arm, Left Updated:  04/11/17 2201     Blood Culture No growth at 2 days    Blood Culture [[id number]]  (Normal) Collected:  04/09/17 2112    Specimen:  Blood from Wrist, Left Updated:  04/11/17 2201     Blood Culture No growth at 2 days    CBC (No Diff) [[id number]]  (Abnormal) Collected:  04/12/17 0623    Specimen:  Blood Updated:  04/12/17 0655     WBC 10.89 (H) 10*3/mm3      RBC 4.23 10*6/mm3      Hemoglobin 12.7 g/dL      Hematocrit 38.6 %      MCV 91.3 fL      MCH 30.0 pg      MCHC 32.9 g/dL      RDW 13.6 %      RDW-SD 45.0 fl      MPV 10.1 fL      Platelets 201 10*3/mm3     Comprehensive Metabolic Panel [[id number]]  (Abnormal) Collected:  04/12/17 0623    Specimen:  Blood Updated:  04/12/17 0710     Glucose 95 mg/dL      BUN 10 mg/dL      Creatinine 0.71 mg/dL      Sodium 140 mmol/L      Potassium 3.7 mmol/L      Chloride 105 mmol/L      CO2 25.0 mmol/L      Calcium 8.3 (L) mg/dL      Total Protein 6.2 (L) g/dL      Albumin 3.20 (L) g/dL      ALT (SGPT) 49 U/L      AST (SGOT) 51 (H) U/L      Alkaline Phosphatase 78 U/L      Total Bilirubin 0.4 mg/dL      eGFR Non African Amer 89 mL/min/1.73      Globulin 3.0 gm/dL      A/G Ratio 1.1 g/dL      BUN/Creatinine Ratio 14.1     Anion Gap 10.0 mmol/L              I reviewed the patient's new clinical results.    Assessment/Plan:     Active Hospital Problems (** Indicates Principal Problem)    Diagnosis Date Noted   • Aspiration pneumonia [J69.0] 04/11/2017     Will continue with Clindamycin  Stable on PO      • Overdose [T50.901A] 04/09/2017     Patient extubated  UDS positive for amphetamines  Patient reported taking gabapentin and Elavil per EMS   No cardiac events.   Patient medically stable.      • Suicidal overdose [T50.902A] 04/09/2017     Will obtain a psych consult today     • Obesity due to excess calories [E66.09] 04/09/2017   • Chronic hepatitis C virus infection [B18.2] 04/09/2017     Will monitor LFTs         Resolved Hospital Problems    Diagnosis Date Noted Date Resolved   • Atelectasis of left lung [J98.11] 04/09/2017 04/12/2017     Possibly due to improper ET placement  ET repositioned in ED   CT scan review shows atelectasis, possible infiltrate.    Will treat for possible aspiration PNA with Clinda PO         DVT prophylaxis: Lovenox  Code status is Full Code    Plan for disposition: Transfer to medical floor, psych consult       Time: Critical care 30 min           This document has been electronically signed by Sandra Dominguez MD on April 12, 2017 8:01 AM

## 2017-04-12 NOTE — NURSING NOTE
Patient admitted to [facility] via w/c with staff and security at 1229 to room 661.  Patient has sad tearful affect.  She is calm and cooperative.  She states she does have anxiety at a level of 7.  She states she has depression at a level of 7.  She does have lower back pain at level of 4.  She denies SI and HI at this time.  Patient oriented to unit.  Care plans initiated.  Patient verbalizes understanding.

## 2017-04-12 NOTE — DISCHARGE SUMMARY
DISCHARGE SUMMARY    NAME: Rozina Mccartney   PHYSICIAN: Sandra Dominguez MD  : 1970  MRN: [id number]    ADMITTED: 2017   DISCHARGED:  17    ADMISSION DIAGNOSES:   Hospital Problem List     Obesity due to excess calories    Chronic hepatitis C virus infection    Overview Signed 2017  5:55 PM by Sandra Dominguez MD     Will monitor LFTs          Aspiration pneumonia    Overview Addendum 2017  8:01 AM by Sandra Dominguez MD     Will continue with Clindamycin  Stable on PO                  DISCHARGE DIAGNOSES:   Hospital Problem List     Obesity due to excess calories    Chronic hepatitis C virus infection    Overview Signed 2017  5:55 PM by Sandra Dominguez MD     Will monitor LFTs          Aspiration pneumonia    Overview Addendum 2017  8:01 AM by Sandra Dominguez MD     Will continue with Clindamycin  Stable on PO                  SERVICE: Family Medicine. Attending Dr. Guthrie, Resident Sandra Dominguez MD    CONSULTS:   Consult Orders (all)     Start     Ordered    17 1132  Inpatient Consult to Psychiatrist  Once     Specialty:  Psychiatry  Provider:  Jaren Hale III, MD    17 1132    04/10/17 1109  Inpatient Consult to Case Management   Once     Provider:  (Not yet assigned)    04/10/17 1113    17 2030  Inpatient Consult to Pulmonology  Once     Comments:  VM left   Specialty:  Pulmonary Disease  Provider:  Mariana Shannon MD    17 170  Family Practice - Resident (on-call MD unless specified)  Once     Specialty:  Family Medicine  Provider:  Sandra Dominguez MD    17 1702          PROCEDURES:   Imaging Results (last 24 hours)     ** No results found for the last 24 hours. **          HISTORY OF PRESENT ILLNESS:   Patient is a 46 y.o. female presented to the emergency department via EMS for intentional drug overdose.  Patient had called EMS and stated she was going to take all of her Elavil and  Neurontin in hopes that she would die.  Patient was very sedated in the emergency department and was subsequently intubated.     DIAGNOSTIC DATA:   Lab Results (last 24 hours)     Procedure Component Value Units Date/Time    Blood Culture [[id number]]  (Normal) Collected:  04/09/17 2102    Specimen:  Blood from Arm, Left Updated:  04/11/17 2201     Blood Culture No growth at 2 days    Blood Culture [[id number]]  (Normal) Collected:  04/09/17 2112    Specimen:  Blood from Wrist, Left Updated:  04/11/17 2201     Blood Culture No growth at 2 days    CBC (No Diff) [[id number]]  (Abnormal) Collected:  04/12/17 0623    Specimen:  Blood Updated:  04/12/17 0655     WBC 10.89 (H) 10*3/mm3      RBC 4.23 10*6/mm3      Hemoglobin 12.7 g/dL      Hematocrit 38.6 %      MCV 91.3 fL      MCH 30.0 pg      MCHC 32.9 g/dL      RDW 13.6 %      RDW-SD 45.0 fl      MPV 10.1 fL      Platelets 201 10*3/mm3     Comprehensive Metabolic Panel [[id number]]  (Abnormal) Collected:  04/12/17 0623    Specimen:  Blood Updated:  04/12/17 0710     Glucose 95 mg/dL      BUN 10 mg/dL      Creatinine 0.71 mg/dL      Sodium 140 mmol/L      Potassium 3.7 mmol/L      Chloride 105 mmol/L      CO2 25.0 mmol/L      Calcium 8.3 (L) mg/dL      Total Protein 6.2 (L) g/dL      Albumin 3.20 (L) g/dL      ALT (SGPT) 49 U/L      AST (SGOT) 51 (H) U/L      Alkaline Phosphatase 78 U/L      Total Bilirubin 0.4 mg/dL      eGFR Non African Amer 89 mL/min/1.73      Globulin 3.0 gm/dL      A/G Ratio 1.1 g/dL      BUN/Creatinine Ratio 14.1     Anion Gap 10.0 mmol/L              HOSPITAL COURSE:  Patient was admitted to the ICU due to being intubated and sedated.  Patient was successfully extubated the following day. She was transitioned to PO Clindamycin for completion of possible aspiration pneumonia. She was transferred to the regular medical floor. She has been doing very well.  She states she does not remember the episode of overdosing. She is denying SI/HI at this time.   However, she does admit to prior overdoses, mental instability and previous psych issues. Patient is agreeable to transfer to the psych floor for further treatment.     DISCHARGE CONDITION:   good    DISPOSITION:  Psychiatric Hospital or Unit ([facility])    DISCHARGE MEDICATIONS   Rozina Mccartney   Home Medication Instructions NATALIE:[id number]    Printed on:04/12/17 1048   Medication Information                      clindamycin (CLEOCIN) 150 MG capsule  Take 3 capsules by mouth Every 8 (Eight) Hours for 4 days. Indications: Pneumonia                 INSTRUCTIONS:  Activity:   Activity Instructions     Activity as Tolerated                   Diet:   Diet Instructions     Diet: Regular; Thin Liquids, No Restrictions       Discharge Diet:  Regular   Fluid Consistency:  Thin Liquids, No Restrictions               Special instructions: Patient instructed to call MD or return to ED with worsening shortness of breath, chest pain, fever greater than 100.4 degrees F or any other medical concerns..    FOLLOW UP:   Follow-up Information     Follow up with No Known Provider .    Contact information:    [facility] [id number]            PENDING TEST RESULTS AT DISCHARGE   Order Current Status    Blood Culture Preliminary result    Blood Culture Preliminary result          Time: Discharge 30 min    Dr. Guthrie is the attending at time of discharge, she is aware of the patient's status and agrees with the above discharge summary.          This document has been electronically signed by Sandra Dominguez MD on April 12, 2017 10:55 AM

## 2017-04-13 PROBLEM — F43.10 POST TRAUMATIC STRESS DISORDER (PTSD): Status: ACTIVE | Noted: 2017-04-13

## 2017-04-13 PROBLEM — T14.91XA SUICIDE ATTEMPT (HCC): Status: ACTIVE | Noted: 2017-04-12

## 2017-04-13 PROBLEM — F33.2 SEVERE EPISODE OF RECURRENT MAJOR DEPRESSIVE DISORDER, WITHOUT PSYCHOTIC FEATURES (HCC): Status: ACTIVE | Noted: 2017-04-13

## 2017-04-13 PROCEDURE — 94799 UNLISTED PULMONARY SVC/PX: CPT

## 2017-04-13 PROCEDURE — 94760 N-INVAS EAR/PLS OXIMETRY 1: CPT

## 2017-04-13 PROCEDURE — 90791 PSYCH DIAGNOSTIC EVALUATION: CPT | Performed by: PSYCHIATRY & NEUROLOGY

## 2017-04-13 PROCEDURE — 99222 1ST HOSP IP/OBS MODERATE 55: CPT | Performed by: FAMILY MEDICINE

## 2017-04-13 RX ORDER — GABAPENTIN 400 MG/1
400 CAPSULE ORAL 3 TIMES DAILY
Status: DISCONTINUED | OUTPATIENT
Start: 2017-04-13 | End: 2017-04-17 | Stop reason: HOSPADM

## 2017-04-13 RX ORDER — MIRTAZAPINE 15 MG/1
15 TABLET, FILM COATED ORAL NIGHTLY
Status: DISCONTINUED | OUTPATIENT
Start: 2017-04-13 | End: 2017-04-14

## 2017-04-13 RX ORDER — PAROXETINE HYDROCHLORIDE 20 MG/1
40 TABLET, FILM COATED ORAL DAILY
Status: COMPLETED | OUTPATIENT
Start: 2017-04-14 | End: 2017-04-14

## 2017-04-13 RX ORDER — LAMOTRIGINE 25 MG/1
25 TABLET ORAL DAILY
Status: DISCONTINUED | OUTPATIENT
Start: 2017-04-13 | End: 2017-04-14

## 2017-04-13 RX ORDER — PAROXETINE HYDROCHLORIDE 20 MG/1
60 TABLET, FILM COATED ORAL DAILY
Status: DISCONTINUED | OUTPATIENT
Start: 2017-04-15 | End: 2017-04-17 | Stop reason: HOSPADM

## 2017-04-13 RX ORDER — PAROXETINE HYDROCHLORIDE 20 MG/1
20 TABLET, FILM COATED ORAL DAILY
Status: COMPLETED | OUTPATIENT
Start: 2017-04-13 | End: 2017-04-13

## 2017-04-13 RX ADMIN — MIRTAZAPINE 15 MG: 15 TABLET, FILM COATED ORAL at 21:05

## 2017-04-13 RX ADMIN — PAROXETINE HYDROCHLORIDE 20 MG: 20 TABLET, FILM COATED ORAL at 15:33

## 2017-04-13 RX ADMIN — GABAPENTIN 400 MG: 400 CAPSULE ORAL at 15:31

## 2017-04-13 RX ADMIN — LAMOTRIGINE 25 MG: 25 TABLET ORAL at 15:34

## 2017-04-13 RX ADMIN — NICOTINE 1 PATCH: 21 PATCH TRANSDERMAL at 08:31

## 2017-04-13 RX ADMIN — CLINDAMYCIN HYDROCHLORIDE 450 MG: 150 CAPSULE ORAL at 21:05

## 2017-04-13 RX ADMIN — ALBUTEROL SULFATE 2.5 MG: 2.5 SOLUTION RESPIRATORY (INHALATION) at 14:14

## 2017-04-13 RX ADMIN — CLINDAMYCIN HYDROCHLORIDE 450 MG: 150 CAPSULE ORAL at 06:25

## 2017-04-13 RX ADMIN — GABAPENTIN 400 MG: 400 CAPSULE ORAL at 21:05

## 2017-04-13 RX ADMIN — HYDROXYZINE PAMOATE 50 MG: 50 CAPSULE ORAL at 08:35

## 2017-04-13 RX ADMIN — CLINDAMYCIN HYDROCHLORIDE 450 MG: 150 CAPSULE ORAL at 15:34

## 2017-04-13 NOTE — NURSING NOTE
"Consult called to Dr. Hale from [facility].  45 yo female overdosed on 4/9/17 by taking her prescription Neurontin and Elavil.  Patient called EMS stating hopefully this would kill her.  Brought in by EMS.  Admitted to critical care unit on ventilator.  Once patient woke, and was extubated, she did not recall the events of her OD.  Anxious and depressed.  Tearful.  Requested \"help\".  Agreed to voluntary inpatient psych services due to suicide attempt, depression and anxiety.  "

## 2017-04-13 NOTE — PLAN OF CARE
Problem: BH Patient Care Overview (Adult)  Goal: Plan of Care Review  Outcome: Ongoing (interventions implemented as appropriate)    Problem: BH Overarching Goals  Goal: Adheres to Safety Considerations for Self and Others  Outcome: Ongoing (interventions implemented as appropriate)  Goal: Optimized Coping Skills in Response to Life Stressors  Outcome: Ongoing (interventions implemented as appropriate)  Goal: Develops/Participates in Therapeutic [place] to Support Successful Transition  Outcome: Ongoing (interventions implemented as appropriate)    Problem: Depression  Goal: Treatment Goal: Demonstrate behavioral control of depressive symptoms, verbalize feelings of improved mood/affect, and adopt new coping skills prior to discharge  Outcome: Ongoing (interventions implemented as appropriate)  Goal: Verbalize thoughts and feelings associated with:  Outcome: Ongoing (interventions implemented as appropriate)  Goal: Refrain from harming self  Outcome: Ongoing (interventions implemented as appropriate)  Goal: Refrain from isolation  Outcome: Ongoing (interventions implemented as appropriate)  Goal: Refrain from self-neglect  Outcome: Ongoing (interventions implemented as appropriate)  Goal: Attend and participate in unit activities, including therapeutic, recreational, and educational groups  Outcome: Ongoing (interventions implemented as appropriate)  Goal: Complete daily ADLs, including personal hygiene independently, as able  Outcome: Ongoing (interventions implemented as appropriate)    Problem: Risk for Self Injury/Neglect  Goal: Treatment Goal: Remain safe during length of stay, learn and adopt new coping skills, and be free of self-injurious ideation, impulses and acts at the time of discharge  Outcome: Ongoing (interventions implemented as appropriate)  Goal: Verbalize thoughts and feelings associated with:  Outcome: Ongoing (interventions implemented as appropriate)  Goal: Refrain from harming self  Outcome:  Ongoing (interventions implemented as appropriate)  Goal: Attend and participate in unit activities, including therapeutic, recreational, and educational groups  Outcome: Ongoing (interventions implemented as appropriate)  Goal: Recognize maladaptive responses and adopt new coping mechanisms  Outcome: Ongoing (interventions implemented as appropriate)  Goal: Complete daily ADLs, including personal hygiene independently, as able  Outcome: Ongoing (interventions implemented as appropriate)    Problem: SUBSTANCE USE/ABUSE  Goal: By day 5 will complete medical detox and be discharged with an appropriate treatment plan in place.  Outcome: Ongoing (interventions implemented as appropriate)  Goal: By discharge, will develop insight into their chemical dependency and sustain motivation to continue in recovery.  Outcome: Ongoing (interventions implemented as appropriate)  Goal: By discharge, will have in place an ongoing treatment plan in collaboration with assigned CDP.  Outcome: Ongoing (interventions implemented as appropriate)

## 2017-04-13 NOTE — PLAN OF CARE
Problem: BH Patient Care Overview (Adult)  Goal: Plan of Care Review  Outcome: Ongoing (interventions implemented as appropriate)    04/13/17 0533   Coping/Psychosocial Response Interventions   Plan Of Care Reviewed With patient   Coping/Psychosocial   Patient Agreement with Plan of Care agrees   Patient Care Overview   Progress no change       Goal: Individualization and Mutuality  Outcome: Ongoing (interventions implemented as appropriate)  Goal: Discharge Needs Assessment  Outcome: Ongoing (interventions implemented as appropriate)    Problem:  Overarching Goals  Goal: Adheres to Safety Considerations for Self and Others  Outcome: Ongoing (interventions implemented as appropriate)  Goal: Optimized Coping Skills in Response to Life Stressors  Outcome: Ongoing (interventions implemented as appropriate)  Goal: Develops/Participates in Therapeutic [place] to Support Successful Transition  Outcome: Ongoing (interventions implemented as appropriate)

## 2017-04-13 NOTE — NURSING NOTE
Behavior   Anxiety 7  Depression 8  Pain 6  AVH no  S/I no  H/I no   Patient c/o back pain. Patient requested something for anxiety, pain, diarrhea  & sleep. Maintained eye contact, calm & cooperative. Patient revealed she had an upsetting phone call this evening and it caused her anxiety. Patient sitting talking with peer.  Intervention  Instructed in med usage and effects, encouraged to verbalize needs. Meds administered as ordered.  Response  Verbalized understanding   Plan  Continue to monitor for safety/  every 15 minute monitoring checks.
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
## 2017-04-13 NOTE — CONSULTS
CHIEF COMPLAINT/REASON FOR VISIT:  Suicide Attempt    HPI:  Patient presented to our ED with the above complaint on April 9 at 4:06 PM.  The patient called EMS telling them she was going to take all of her Elavil and Neurontin in hopes that she would die.  In the emergency room she was extremely sedated and was intubated and admitted in the ICU to the family medicine service.  She was extubated the next day and after initially being treated with IV antibiotics was changed to by mouth clindamycin for presumed aspiration pneumonia.  Her chest x-ray supported this diagnosis.  At the time of discharge from the family medicine service she reported that she did not remember this overdose but does admit to previous overdoses and some problems with mental health in the past.  She was seen in consultation by behavioral health and then admitted here.    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      • gabapentin (NEURONTIN) 800 MG tablet Take 800 mg by mouth 4 (Four) Times a Day.      • PARoxetine (PAXIL) 10 MG tablet Take 40 mg by mouth Every Morning.      • temazepam (RESTORIL) 15 MG capsule Take 15 mg by mouth Daily.      • clindamycin (CLEOCIN) 150 MG capsule Take 3 capsules by mouth Every 8 (Eight) Hours for 4 days. Indications: Pneumonia 36 capsule 0        ALLERGIES:  Penicillins; Seroquel [quetiapine fumarate]; Stadol [butorphanol]; and Wellbutrin [bupropion]      PAST MEDICAL/SURGICAL HISTORY:  Past Medical History:   Diagnosis Date   • Anxiety state    • Bronchitis    • Chest pain    •  Depressive disorder    • Essential hypertension    • Excessive or frequent menstruation    • Hyperlipidemia    • Intermenstrual bleeding     irregular - suspect endometrial versus endocervical polyp   • Osteoarthritis of multiple joints    • PTSD (post-traumatic stress disorder)    • Suicide attempt    • Surgery follow-up    • Tobacco dependence syndrome    • Viral hepatitis C    • Wheezing    • Withdrawal symptoms, drug or narcotic        Past Surgical History:   Procedure Laterality Date   • BREAST SURGERY      reduction   •  SECTION  2005    x2   • COSMETIC SURGERY      breast reduction   • DENTAL PROCEDURE      wisdom teeth   • DILATATION AND CURETTAGE      x2   • FRACTURE SURGERY     • HYSTEROSCOPY     • INCISION AND DRAINAGE ABSCESS  2013    abscess of hand, septic arthritis, metatarsophalangeal joint, right hand   • INJECTION OF MEDICATION  2016    kenalog   • NECK SURGERY  1994 &    • SUPRACERVICAL HYSTERECTOMY SALPINGO OOPHORECTOMY Bilateral 2013    exam under anesthesia and supracervical hysterectomy with bilateral salpingo-ooporectomy. menometrorrhagia with stage 4 endometriosis   • TONSILLECTOMY     • TONSILLECTOMY         Review of Systems   Constitutional: Negative for activity change, appetite change, fatigue and fever.   HENT: Positive for sore throat. Negative for congestion, ear discharge, ear pain, facial swelling, hearing loss, nosebleeds, postnasal drip, rhinorrhea, sinus pressure, tinnitus and trouble swallowing.    Eyes: Negative for pain, discharge and visual disturbance.   Respiratory: Positive for cough. Negative for shortness of breath and wheezing.    Cardiovascular: Negative for chest pain, palpitations and leg swelling.   Gastrointestinal: Negative for abdominal pain, blood in stool, constipation, diarrhea, nausea and vomiting.   Genitourinary: Negative for difficulty urinating, dyspareunia, dysuria, flank pain, frequency, hematuria,  "menstrual problem, pelvic pain, urgency, vaginal bleeding and vaginal discharge.   Musculoskeletal: Negative for arthralgias, back pain, joint swelling, myalgias and neck pain.   Skin: Negative for rash and wound.   Neurological: Negative for dizziness, seizures, syncope, weakness, light-headedness and headaches.   Hematological: Negative for adenopathy.       Social History     Social History   • Marital status:      Spouse name: N/A   • Number of children: N/A   • Years of education: N/A     Occupational History   • Not on file.     Social History Main Topics   • Smoking status: Current Every Day Smoker     Packs/day: 0.50     Years: 22.00     Types: Cigarettes   • Smokeless tobacco: Never Used   • Alcohol use No      Comment: unable to assess, patient intubated at this time.    • Drug use: Yes     Special: \"Crack\" cocaine, Methamphetamines   • Sexual activity: Yes     Partners: Male     Other Topics Concern   • Not on file     Social History Narrative       History reviewed. No pertinent family history.          Objective     /58 (BP Location: Right arm, Patient Position: Lying)  Pulse 73  Temp 97.6 °F (36.4 °C) (Tympanic)   Resp 18  Ht 62\" (157.5 cm)  Wt 210 lb 11.2 oz (95.6 kg)  SpO2 96%  BMI 38.54 kg/m2    Physical Exam   Constitutional: She appears well-developed and well-nourished.   HENT:   Head: Normocephalic and atraumatic.   Eyes: Conjunctivae and EOM are normal.   Neck: Normal range of motion. Neck supple. No thyromegaly present.   Cardiovascular: Normal rate, regular rhythm and normal heart sounds.  Exam reveals no gallop and no friction rub.    No murmur heard.  Pulmonary/Chest: Effort normal. No respiratory distress. She has no wheezes. She has no rales.   Coarse rhonchi throughout with good air movement and no localized rales or wheezes.   Abdominal: Soft. She exhibits no distension and no mass. There is no tenderness. There is no rebound and no guarding.   Musculoskeletal: " Normal range of motion.   Lymphadenopathy:     She has no cervical adenopathy.   Neurological: She is alert. She has normal strength and normal reflexes. She displays no tremor and normal reflexes. No cranial nerve deficit or sensory deficit. She exhibits normal muscle tone. Coordination normal. She displays no Babinski's sign on the right side. She displays no Babinski's sign on the left side.   Reflex Scores:       Tricep reflexes are 2+ on the right side and 2+ on the left side.       Bicep reflexes are 2+ on the right side and 2+ on the left side.       Brachioradialis reflexes are 2+ on the right side and 2+ on the left side.       Patellar reflexes are 2+ on the right side and 2+ on the left side.       Achilles reflexes are 2+ on the right side and 2+ on the left side.  Skin: Skin is warm and dry. No rash noted. No erythema.   Nursing note and vitals reviewed.      Dystonia/Tardive Dyskinesia  Absent  Meningeal Signs  Absent    Diagnostic Studies    Blood Culture [[id number]] (Normal) Collected: 04/09/17 2102      Specimen: Blood from Arm, Left Updated: 04/11/17 2201       Blood Culture No growth at 2 days     Blood Culture [[id number]] (Normal) Collected: 04/09/17 2112     Specimen: Blood from Wrist, Left Updated: 04/11/17 2201       Blood Culture No growth at 2 days     CBC (No Diff) [[id number]] (Abnormal) Collected: 04/12/17 0623     Specimen: Blood Updated: 04/12/17 0655       WBC 10.89 (H) 10*3/mm3         RBC 4.23 10*6/mm3         Hemoglobin 12.7 g/dL         Hematocrit 38.6 %         MCV 91.3 fL         MCH 30.0 pg         MCHC 32.9 g/dL         RDW 13.6 %         RDW-SD 45.0 fl         MPV 10.1 fL         Platelets 201 10*3/mm3       Comprehensive Metabolic Panel [[id number]] (Abnormal) Collected: 04/12/17 0623     Specimen: Blood Updated: 04/12/17 0710       Glucose 95 mg/dL         BUN 10 mg/dL         Creatinine 0.71 mg/dL         Sodium 140 mmol/L         Potassium 3.7 mmol/L         Chloride 105 mmol/L          CO2 25.0 mmol/L         Calcium 8.3 (L) mg/dL         Total Protein 6.2 (L) g/dL         Albumin 3.20 (L) g/dL         ALT (SGPT) 49 U/L         AST (SGOT) 51 (H) U/L         Alkaline Phosphatase 78 U/L         Total Bilirubin 0.4 mg/dL         eGFR Non African Amer 89 mL/min/1.73         Globulin 3.0 gm/dL         A/G Ratio 1.1 g/dL         BUN/Creatinine Ratio 14.1       Anion Gap 10.0 mmol/L            Exam: AP portable chest. 4/10/2017      INDICATION: Intubated     COMPARISON: 4/9/2017     FINDINGS: Endotracheal tube tip is 2.7 cm above the anna marie. NG  tube extends into the stomach. Heart size is normal. Perhaps  slight decrease in the right upper lobe consolidation. There is a  no significant interval change in the left lower lobe atelectasis  and/or consolidation.     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  Will continue the clindamycin orally for a total 10 day course.    History of hepatitis C but minimally elevated LFTs at this time.  No further evaluation or intervention at this time.    Hyperlipidemia by history, no current medication.  No intervention at this time.    Hypertension by history not on medicines now.  Recent blood pressures have been low to normal.  No intervention unless hypertension returns.      Continue Home Meds as ordered. Mental health and pain issues managed per psychiatry.  Further diagnostic studies or intervention based on hospital course.

## 2017-04-13 NOTE — NURSING NOTE
Dr Seun COLLIER         General  NONE    Eyes   None     ENT/Mouth   Sore throat    Cardio   None    Resp   None    GI    None       None    MS    None    Skin/Hair/Nails   None    Neuro   None

## 2017-04-13 NOTE — NURSING NOTE
"Behavior   Anxiety 8  Depression 8  Pain 0  AVH no  S/I no  H/I no    Pt's affect flat, appears depressed, tearful.  Pt denied any hallucinations, suicidal or homicidal ideations at this time.  Pt stated that she did not sleep last night, she had racing thoughts all night.  \"I just kept thinking about everything.\"  Taking medications as prescribed.        Intervention  Instructed in med usage and effects, encouraged to verbalize needs. Meds administered as ordered.  Pt encouraged to attend groups and follow treatment plan.          Response  Verbalized understanding           Plan  Continue to monitor for safety/  every 15 minute monitoring checks.  "
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
## 2017-04-14 LAB
BACTERIA SPEC AEROBE CULT: NORMAL
BACTERIA SPEC AEROBE CULT: NORMAL

## 2017-04-14 PROCEDURE — 99232 SBSQ HOSP IP/OBS MODERATE 35: CPT | Performed by: NURSE PRACTITIONER

## 2017-04-14 PROCEDURE — 94799 UNLISTED PULMONARY SVC/PX: CPT

## 2017-04-14 PROCEDURE — 94760 N-INVAS EAR/PLS OXIMETRY 1: CPT

## 2017-04-14 RX ORDER — MIRTAZAPINE 15 MG/1
7.5 TABLET, FILM COATED ORAL NIGHTLY
Status: DISCONTINUED | OUTPATIENT
Start: 2017-04-14 | End: 2017-04-16

## 2017-04-14 RX ORDER — LAMOTRIGINE 25 MG/1
25 TABLET ORAL NIGHTLY
Status: DISCONTINUED | OUTPATIENT
Start: 2017-04-15 | End: 2017-04-17 | Stop reason: HOSPADM

## 2017-04-14 RX ADMIN — TRAZODONE HYDROCHLORIDE 50 MG: 50 TABLET ORAL at 00:02

## 2017-04-14 RX ADMIN — HYDROXYZINE PAMOATE 50 MG: 50 CAPSULE ORAL at 21:33

## 2017-04-14 RX ADMIN — GABAPENTIN 400 MG: 400 CAPSULE ORAL at 08:42

## 2017-04-14 RX ADMIN — ALBUTEROL SULFATE 2.5 MG: 2.5 SOLUTION RESPIRATORY (INHALATION) at 10:10

## 2017-04-14 RX ADMIN — GABAPENTIN 400 MG: 400 CAPSULE ORAL at 17:07

## 2017-04-14 RX ADMIN — CLINDAMYCIN HYDROCHLORIDE 450 MG: 150 CAPSULE ORAL at 17:03

## 2017-04-14 RX ADMIN — GABAPENTIN 400 MG: 400 CAPSULE ORAL at 21:33

## 2017-04-14 RX ADMIN — PAROXETINE HYDROCHLORIDE 40 MG: 20 TABLET, FILM COATED ORAL at 08:42

## 2017-04-14 RX ADMIN — CLINDAMYCIN HYDROCHLORIDE 450 MG: 150 CAPSULE ORAL at 06:02

## 2017-04-14 RX ADMIN — LAMOTRIGINE 25 MG: 25 TABLET ORAL at 08:42

## 2017-04-14 RX ADMIN — Medication 7.5 MG: at 21:33

## 2017-04-14 RX ADMIN — DOCUSATE SODIUM 100 MG: 100 CAPSULE, LIQUID FILLED ORAL at 12:38

## 2017-04-14 NOTE — DISCHARGE PLACEMENT REQUEST
"Rozina Mccartney (46 y.o. Female)     Date of Birth Social Security Number Address Home Phone MRN    1970  [id number]  [facility] [id number] [phone] [id number]    Evangelical Marital Status          Confucianism        Admission Date Admission Type Admitting Provider Attending Provider Department, Room/Bed    4/12/17 Elective Ilene Jimenez MD Abubucker, Shabeer, MD [facility] PSYCHIATRIC, 661/1    Discharge Date Discharge Disposition Discharge Destination                      Attending Provider: Ilene Jimenez MD     Allergies:  Penicillins, Seroquel [Quetiapine Fumarate], Stadol [Butorphanol], Wellbutrin [Bupropion]    Isolation:  None   Infection:  None   Code Status:  FULL    Ht:  62\" (157.5 cm)   Wt:  210 lb 11.2 oz (95.6 kg)    Admission Cmt:  None   Principal Problem:  Post traumatic stress disorder (PTSD) [F43.10]                 Active Insurance as of 4/12/2017     Primary Coverage     Payor Plan Insurance Group Employer/Plan Group    [employer] [place] [employer] [place]ICA[place]      Payor Plan Address Payor Plan Phone Number Effective From Effective To    [address] [address] [phone] 3/27/2017     [place] [address]       Subscriber Name Subscriber Birth Date Member ID       ROZINA MCCARTNEY 1970 [id number]                 Emergency Contacts      (Rel.) Home Phone Work Phone Mobile Phone    Kristina Santo (Relative) [phone] -- --            Insurance Information                [facility]/WELLCARE MEDICAID Phone: [phone]    Subscriber: Rozina Mccartney Subscriber#: [id number]    Group#:  Precert#:           Hospital Medications (all)       Dose Frequency Start End    acetaminophen (TYLENOL) tablet 650 mg 650 mg Every 4 Hours PRN 4/12/2017     Sig - Route: Take 2 tablets by mouth Every 4 (Four) Hours As Needed for Mild Pain (1-3) or Moderate Pain (4-6) (severe pain (7-10)). - Oral    albuterol (PROVENTIL) nebulizer solution 0.5% 2.5 " mg/0.5mL 2.5 mg Every 6 Hours PRN 4/13/2017     Sig - Route: Take 0.5 mL by nebulization Every 6 (Six) Hours As Needed for Wheezing. - Nebulization    aluminum-magnesium hydroxide-simethicone (MAALOX/MYLANTA) suspension 30 mL 30 mL Every 6 Hours PRN 4/12/2017     Sig - Route: Take 30 mL by mouth Every 6 (Six) Hours As Needed for Heartburn. - Oral    clindamycin (CLEOCIN) capsule 450 mg 450 mg Every 8 Hours Scheduled 4/12/2017 4/16/2017    Sig - Route: Take 3 capsules by mouth Every 8 (Eight) Hours. - Oral    CloNIDine (CATAPRES) tablet 0.1 mg 0.1 mg Every 4 Hours PRN 4/12/2017     Sig - Route: Take 1 tablet by mouth Every 4 (Four) Hours As Needed for High Blood Pressure (For BP > 165/95.  Repeat BP in 1-2hrs.  Call MD for BP > 210/110 or cardiac or neurological symptoms.). - Oral    docusate sodium (COLACE) capsule 100 mg 100 mg 2 Times Daily PRN 4/12/2017     Sig - Route: Take 1 capsule by mouth 2 (Two) Times a Day As Needed for Constipation. - Oral    gabapentin (NEURONTIN) capsule 400 mg 400 mg 3 Times Daily 4/13/2017     Sig - Route: Take 1 capsule by mouth 3 (Three) Times a Day. - Oral    hydrOXYzine (VISTARIL) capsule 50 mg 50 mg Every 6 Hours PRN 4/12/2017     Sig - Route: Take 1 capsule by mouth Every 6 (Six) Hours As Needed for Anxiety. - Oral    lamoTRIgine (LaMICtal) tablet 25 mg 25 mg Daily 4/13/2017     Sig - Route: Take 1 tablet by mouth Daily. - Oral    loperamide (IMODIUM) capsule 2 mg 2 mg 4 Times Daily PRN 4/12/2017     Sig - Route: Take 1 capsule by mouth 4 (Four) Times a Day As Needed for Diarrhea. - Oral    magnesium hydroxide (MILK OF MAGNESIA) suspension 2400 mg/10mL 10 mL 10 mL Daily PRN 4/12/2017     Sig - Route: Take 10 mL by mouth Daily As Needed for Constipation. - Oral    mirtazapine (REMERON) tablet 15 mg 15 mg Nightly 4/13/2017     Sig - Route: Take 1 tablet by mouth Every Night. - Oral    nicotine (NICODERM CQ) 21 MG/24HR patch 1 patch 1 patch Every 24 Hours 4/12/2017     Sig - Route:  "Place 1 patch on the skin Daily. - Transdermal    PARoxetine (PAXIL) tablet 20 mg 20 mg Daily 4/13/2017 4/13/2017    Sig - Route: Take 1 tablet by mouth Daily. - Oral    Linked Group 1:  \"Followed by\" Linked Group Details        PARoxetine (PAXIL) tablet 40 mg 40 mg Daily 4/14/2017 4/14/2017    Sig - Route: Take 2 tablets by mouth Daily. - Oral    Linked Group 1:  \"Followed by\" Linked Group Details        PARoxetine (PAXIL) tablet 60 mg 60 mg Daily 4/15/2017     Sig - Route: Take 3 tablets by mouth Daily. - Oral    Linked Group 1:  \"Followed by\" Linked Group Details        traZODone (DESYREL) tablet 50 mg 50 mg Nightly PRN 4/12/2017     Sig - Route: Take 1 tablet by mouth At Night As Needed for Sleep (insomnia). - Oral          Physician Progress Notes (all)     No notes of this type exist for this encounter.           Consult Notes (all)      Suresh Kohli MD at 4/13/2017  9:25 AM  Version 1 of 1     Consult Orders:    1. Inpatient Consult to Hospitalist [[id number]] ordered by Ilene Jimenez MD at 04/12/17 2255                  CHIEF COMPLAINT/REASON FOR VISIT:  Suicide Attempt    HPI:  Patient presented to our ED with the above complaint on April 9 at 4:06 PM.  The patient called EMS telling them she was going to take all of her Elavil and Neurontin in hopes that she would die.  In the emergency room she was extremely sedated and was intubated and admitted in the ICU to the family medicine service.  She was extubated the next day and after initially being treated with IV antibiotics was changed to by mouth clindamycin for presumed aspiration pneumonia.  Her chest x-ray supported this diagnosis.  At the time of discharge from the family medicine service she reported that she did not remember this overdose but does admit to previous overdoses and some problems with mental health in the past.  She was seen in consultation by behavioral health and then admitted here.    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      • gabapentin (NEURONTIN) 800 MG tablet Take 800 mg by mouth 4 (Four) Times a Day.      • PARoxetine (PAXIL) 10 MG tablet Take 40 mg by mouth Every Morning.      • temazepam (RESTORIL) 15 MG capsule Take 15 mg by mouth Daily.      • clindamycin (CLEOCIN) 150 MG capsule Take 3 capsules by mouth Every 8 (Eight) Hours for 4 days. Indications: Pneumonia 36 capsule 0        ALLERGIES:  Penicillins; Seroquel [quetiapine fumarate]; Stadol [butorphanol]; and Wellbutrin [bupropion]      PAST MEDICAL/SURGICAL HISTORY:  Past Medical History:   Diagnosis Date   • Anxiety state    • Bronchitis    • Chest pain    • Depressive disorder    • Essential hypertension    • Excessive or frequent menstruation    • Hyperlipidemia    • Intermenstrual bleeding     irregular - suspect endometrial versus endocervical polyp   • Osteoarthritis of multiple joints    • PTSD (post-traumatic stress disorder)    • Suicide attempt    • Surgery follow-up    • Tobacco dependence syndrome    • Viral hepatitis C    • Wheezing    • Withdrawal symptoms, drug or narcotic        Past Surgical History:   Procedure Laterality Date   • BREAST SURGERY      reduction   •  SECTION  2005    x2   • COSMETIC SURGERY      breast reduction   • DENTAL PROCEDURE  1992    wisdom teeth   • DILATATION AND CURETTAGE      x2   • FRACTURE SURGERY     • HYSTEROSCOPY     • INCISION AND DRAINAGE ABSCESS  2013    abscess of hand, septic arthritis, metatarsophalangeal joint, right hand   • INJECTION OF MEDICATION  2016    kenalog   •  "NECK SURGERY  1994 1993 & 1994   • SUPRACERVICAL HYSTERECTOMY SALPINGO OOPHORECTOMY Bilateral 12/03/2013    exam under anesthesia and supracervical hysterectomy with bilateral salpingo-ooporectomy. menometrorrhagia with stage 4 endometriosis   • TONSILLECTOMY  1978   • TONSILLECTOMY         Review of Systems   Constitutional: Negative for activity change, appetite change, fatigue and fever.   HENT: Positive for sore throat. Negative for congestion, ear discharge, ear pain, facial swelling, hearing loss, nosebleeds, postnasal drip, rhinorrhea, sinus pressure, tinnitus and trouble swallowing.    Eyes: Negative for pain, discharge and visual disturbance.   Respiratory: Positive for cough. Negative for shortness of breath and wheezing.    Cardiovascular: Negative for chest pain, palpitations and leg swelling.   Gastrointestinal: Negative for abdominal pain, blood in stool, constipation, diarrhea, nausea and vomiting.   Genitourinary: Negative for difficulty urinating, dyspareunia, dysuria, flank pain, frequency, hematuria, menstrual problem, pelvic pain, urgency, vaginal bleeding and vaginal discharge.   Musculoskeletal: Negative for arthralgias, back pain, joint swelling, myalgias and neck pain.   Skin: Negative for rash and wound.   Neurological: Negative for dizziness, seizures, syncope, weakness, light-headedness and headaches.   Hematological: Negative for adenopathy.       Social History     Social History   • Marital status:      Spouse name: N/A   • Number of children: N/A   • Years of education: N/A     Occupational History   • Not on file.     Social History Main Topics   • Smoking status: Current Every Day Smoker     Packs/day: 0.50     Years: 22.00     Types: Cigarettes   • Smokeless tobacco: Never Used   • Alcohol use No      Comment: unable to assess, patient intubated at this time.    • Drug use: Yes     Special: \"Crack\" cocaine, Methamphetamines   • Sexual activity: Yes     Partners: Male " "    Other Topics Concern   • Not on file     Social History Narrative       History reviewed. No pertinent family history.          Objective     /58 (BP Location: Right arm, Patient Position: Lying)  Pulse 73  Temp 97.6 °F (36.4 °C) (Tympanic)   Resp 18  Ht 62\" (157.5 cm)  Wt 210 lb 11.2 oz (95.6 kg)  SpO2 96%  BMI 38.54 kg/m2    Physical Exam   Constitutional: She appears well-developed and well-nourished.   HENT:   Head: Normocephalic and atraumatic.   Eyes: Conjunctivae and EOM are normal.   Neck: Normal range of motion. Neck supple. No thyromegaly present.   Cardiovascular: Normal rate, regular rhythm and normal heart sounds.  Exam reveals no gallop and no friction rub.    No murmur heard.  Pulmonary/Chest: Effort normal. No respiratory distress. She has no wheezes. She has no rales.   Coarse rhonchi throughout with good air movement and no localized rales or wheezes.   Abdominal: Soft. She exhibits no distension and no mass. There is no tenderness. There is no rebound and no guarding.   Musculoskeletal: Normal range of motion.   Lymphadenopathy:     She has no cervical adenopathy.   Neurological: She is alert. She has normal strength and normal reflexes. She displays no tremor and normal reflexes. No cranial nerve deficit or sensory deficit. She exhibits normal muscle tone. Coordination normal. She displays no Babinski's sign on the right side. She displays no Babinski's sign on the left side.   Reflex Scores:       Tricep reflexes are 2+ on the right side and 2+ on the left side.       Bicep reflexes are 2+ on the right side and 2+ on the left side.       Brachioradialis reflexes are 2+ on the right side and 2+ on the left side.       Patellar reflexes are 2+ on the right side and 2+ on the left side.       Achilles reflexes are 2+ on the right side and 2+ on the left side.  Skin: Skin is warm and dry. No rash noted. No erythema.   Nursing note and vitals reviewed.      Dystonia/Tardive " Dyskinesia  Absent  Meningeal Signs  Absent    Diagnostic Studies    Blood Culture [[id number]] (Normal) Collected: 04/09/17 2102      Specimen: Blood from Arm, Left Updated: 04/11/17 2201       Blood Culture No growth at 2 days     Blood Culture [[id number]] (Normal) Collected: 04/09/17 2112     Specimen: Blood from Wrist, Left Updated: 04/11/17 2201       Blood Culture No growth at 2 days     CBC (No Diff) [[id number]] (Abnormal) Collected: 04/12/17 0623     Specimen: Blood Updated: 04/12/17 0655       WBC 10.89 (H) 10*3/mm3         RBC 4.23 10*6/mm3         Hemoglobin 12.7 g/dL         Hematocrit 38.6 %         MCV 91.3 fL         MCH 30.0 pg         MCHC 32.9 g/dL         RDW 13.6 %         RDW-SD 45.0 fl         MPV 10.1 fL         Platelets 201 10*3/mm3       Comprehensive Metabolic Panel [[id number]] (Abnormal) Collected: 04/12/17 0623     Specimen: Blood Updated: 04/12/17 0710       Glucose 95 mg/dL         BUN 10 mg/dL         Creatinine 0.71 mg/dL         Sodium 140 mmol/L         Potassium 3.7 mmol/L         Chloride 105 mmol/L         CO2 25.0 mmol/L         Calcium 8.3 (L) mg/dL         Total Protein 6.2 (L) g/dL         Albumin 3.20 (L) g/dL         ALT (SGPT) 49 U/L         AST (SGOT) 51 (H) U/L         Alkaline Phosphatase 78 U/L         Total Bilirubin 0.4 mg/dL         eGFR Non African Amer 89 mL/min/1.73         Globulin 3.0 gm/dL         A/G Ratio 1.1 g/dL         BUN/Creatinine Ratio 14.1       Anion Gap 10.0 mmol/L            Exam: AP portable chest. 4/10/2017      INDICATION: Intubated     COMPARISON: 4/9/2017     FINDINGS: Endotracheal tube tip is 2.7 cm above the anna marie. NG  tube extends into the stomach. Heart size is normal. Perhaps  slight decrease in the right upper lobe consolidation. There is a  no significant interval change in the left lower lobe atelectasis  and/or consolidation.     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  Will continue the clindamycin orally for a total 10 day course.    History of hepatitis C but minimally elevated LFTs at this time.  No further evaluation or intervention at this time.    Hyperlipidemia by history, no current medication.  No intervention at this time.    Hypertension by history not on medicines now.  Recent blood pressures have been low to normal.  No intervention unless hypertension returns.      Continue Home Meds as ordered. Mental health and pain issues managed per psychiatry.  Further diagnostic studies or intervention based on hospital course.      Electronically signed by Suresh Kohli MD at 4/13/2017  9:40 AM

## 2017-04-14 NOTE — PLAN OF CARE
Pt requested referral be made to [facility], a women's recovery/rehab program in [place], [place]. CSW called and spoke with admissions office and was informed that pt would have to come to facility for a walk-in assessment and they would then determine if she met criteria for enrollment into their program. CSW did fax clinical info/referral to their admissions department. CSW informed pt and pt requested CSW assist pt in getting in touch with her aunt and uncle who lives nearby [place], [place]. CSW to attempt to assist in that process.

## 2017-04-14 NOTE — NURSING NOTE
"Behavior   Anxiety 3  Depression 5  Pain 3  AVH no  S/I no  H/I no   Patient reveals she feels better today. Calm & cooperative. Stated \" If I need something later I will let you know\"    Intervention  Instructed in med usage and effects, encouraged to verbalize needs. Meds administered as ordered.  Response  Verbalized understanding     Plan  Continue to monitor for safety/  every 15 minute monitoring checks.  "

## 2017-04-14 NOTE — PLAN OF CARE
Problem: BH Patient Care Overview (Adult)  Goal: Plan of Care Review  Outcome: Ongoing (interventions implemented as appropriate)  Goal: Individualization and Mutuality  Outcome: Ongoing (interventions implemented as appropriate)  Goal: Discharge Needs Assessment  Outcome: Ongoing (interventions implemented as appropriate)    Problem: BH Overarching Goals  Goal: Adheres to Safety Considerations for Self and Others  Outcome: Ongoing (interventions implemented as appropriate)  Goal: Optimized Coping Skills in Response to Life Stressors  Outcome: Ongoing (interventions implemented as appropriate)  Goal: Develops/Participates in Therapeutic [place] to Support Successful Transition  Outcome: Ongoing (interventions implemented as appropriate)

## 2017-04-14 NOTE — PROGRESS NOTES
"      4/14/2017    Chief Complaint: suicide attempt and depression    Subjective:  Patient is a 46 y.o. female who was hospitalized for suicidal ideation, suicide attempt, anxiety and depression.   Since yesterday the patient has been continuing to  improved.  Has been having symptoms including anxiety, but without behavioral disturbance and/or aggression and self injury. Patient reports that level of hopefulness is good.  Medications are effective.  Further history reported: she states she is doing well and has a plan to go to [place] and start a residential program there. She reports not sleeping well and that the lamictal causes drowsiness. She states \"I want you to know I'm not playing and I have a plan.\" She has been attending groups. Affect is blunted. Seems to have some difficulty formulating and expressing thoughts. States she has taken Doxepin in the past and did not help with insomnia. Denies nightmares or flashback from trauma.    Objective     Vital Signs    BP 93/56 (BP Location: Right arm, Patient Position: Sitting)  Pulse 84  Temp 98.5 °F (36.9 °C) (Tympanic)   Resp 20  Ht 62\" (157.5 cm)  Wt 210 lb 11.2 oz (95.6 kg)  SpO2 94%  BMI 38.54 kg/m2    Physical Exam:   General Appearance: alert, appears stated age and cooperative,  Hygiene:   good  Gait & Station: Normal  Musculoskeletal: No tremors or abnormal involuntary movements    Mental Status Exam:   Cooperation:  Cooperative  Eye Contact:  Good  Psychomotor Behavior:  Appropriate  Mood: Anxious/Nervous and Worried  Affect:  blunted  Speech:  Monotone  Thought Process:  Coherent  Associations: Goal Directed  Thought Content:     Normal   Suicidal:  None   Homicidal:  None   Hallucinations:  None   Delusion:  None  Cognitive Functioning:   Consciousness: awake and alert  Reliability:  good  Insight:  Fair  Judgement:  Fair  Impulse Control:  Fair    Lab Results (last 24 hours)     ** No results found for the last 24 hours. **        Imaging Results " (last 24 hours)     ** No results found for the last 24 hours. **          Medicine:   Current Facility-Administered Medications:   •  acetaminophen (TYLENOL) tablet 650 mg, 650 mg, Oral, Q4H PRN, Ilene Jimenez MD, 650 mg at 04/12/17 2039  •  albuterol (PROVENTIL) nebulizer solution 0.5% 2.5 mg/0.5mL, 2.5 mg, Nebulization, Q6H PRN, Suresh Kohli MD, 2.5 mg at 04/14/17 1010  •  aluminum-magnesium hydroxide-simethicone (MAALOX/MYLANTA) suspension 30 mL, 30 mL, Oral, Q6H PRN, Ilene Jimenez MD  •  clindamycin (CLEOCIN) capsule 450 mg, 450 mg, Oral, Q8H, Ilene Jimenez MD, 450 mg at 04/14/17 1703  •  CloNIDine (CATAPRES) tablet 0.1 mg, 0.1 mg, Oral, Q4H PRN, Ilene Jimenez MD  •  docusate sodium (COLACE) capsule 100 mg, 100 mg, Oral, BID PRN, Ilene Jimenez MD, 100 mg at 04/14/17 1238  •  gabapentin (NEURONTIN) capsule 400 mg, 400 mg, Oral, TID, Ilene Jimenez MD, 400 mg at 04/14/17 1707  •  hydrOXYzine (VISTARIL) capsule 50 mg, 50 mg, Oral, Q6H PRN, Ilene Jimenez MD, 50 mg at 04/13/17 0835  •  [START ON 4/15/2017] lamoTRIgine (LaMICtal) tablet 25 mg, 25 mg, Oral, Nightly, ANDRIY Hallman  •  loperamide (IMODIUM) capsule 2 mg, 2 mg, Oral, 4x Daily PRN, Ilene Jimenez MD, 2 mg at 04/12/17 2039  •  magnesium hydroxide (MILK OF MAGNESIA) suspension 2400 mg/10mL 10 mL, 10 mL, Oral, Daily PRN, Ilene Jimenez MD  •  mirtazapine (REMERON) half tablet 7.5 mg, 7.5 mg, Oral, Nightly, Eleonora Earl, ANDRIY  •  nicotine (NICODERM CQ) 21 MG/24HR patch 1 patch, 1 patch, Transdermal, Q24H, Ilene Jimenez MD, 1 patch at 04/13/17 0831  •  [COMPLETED] PARoxetine (PAXIL) tablet 20 mg, 20 mg, Oral, Daily, 20 mg at 04/13/17 1533 **FOLLOWED BY** [COMPLETED] PARoxetine (PAXIL) tablet 40 mg, 40 mg, Oral, Daily, 40 mg at 04/14/17 0842 **FOLLOWED BY** [START ON 4/15/2017] PARoxetine (PAXIL) tablet 60 mg, 60 mg, Oral, Daily, Ilene Jimenez MD  •  traZODone (DESYREL) tablet 50 mg, 50  mg, Oral, Nightly PRN, Ilene Jimenez MD, 50 mg at 04/14/17 0002    Diagnoses/Assessment:   Principal Problem:    Post traumatic stress disorder (PTSD)  Active Problems:    Suicide attempt    Amphetamine-type substance use disorder, severe, dependence    Severe episode of recurrent major depressive disorder, without psychotic features      Treatment Plan:    1) Will continue care for the patient on the behavioral health unit at [facility] to ensure patient safety.  2) Will continue to provide treatment with the unit milieu, activities, therapies and psychopharmacological management.  3) Patient to be placed on or continued on every 15 minute safety checks & Suicide precautions.  4) Will continue medical management by Dr. Kohli  5) Will order following labs: no new labs  6) Will make the following medication changes: change lamictal to bedtime starting tomorrow and decrease Remeron to 7.5 to help with sleep.  7) Will continue discharge planning as appropriate for patient.  8) Psychotherapy provided for less than 16 minutes.    Treatment plan and medication risks and benefits discussed with: Patient    ANDRIY Hallman  04/14/17  5:33 PM

## 2017-04-14 NOTE — PLAN OF CARE
Problem: BH Patient Care Overview (Adult)  Goal: Plan of Care Review  Outcome: Ongoing (interventions implemented as appropriate)    04/14/17 0426   Coping/Psychosocial Response Interventions   Plan Of Care Reviewed With patient   Coping/Psychosocial   Patient Agreement with Plan of Care agrees   Patient Care Overview   Progress no change       Goal: Individualization and Mutuality  Outcome: Ongoing (interventions implemented as appropriate)  Goal: Discharge Needs Assessment  Outcome: Ongoing (interventions implemented as appropriate)    Problem:  Overarching Goals  Goal: Adheres to Safety Considerations for Self and Others  Outcome: Ongoing (interventions implemented as appropriate)  Goal: Optimized Coping Skills in Response to Life Stressors  Outcome: Ongoing (interventions implemented as appropriate)  Goal: Develops/Participates in Therapeutic [place] to Support Successful Transition  Outcome: Ongoing (interventions implemented as appropriate)

## 2017-04-15 PROCEDURE — 94799 UNLISTED PULMONARY SVC/PX: CPT

## 2017-04-15 PROCEDURE — 94760 N-INVAS EAR/PLS OXIMETRY 1: CPT

## 2017-04-15 RX ADMIN — GABAPENTIN 400 MG: 400 CAPSULE ORAL at 20:51

## 2017-04-15 RX ADMIN — MAGNESIUM HYDROXIDE 10 ML: 2400 SUSPENSION ORAL at 16:19

## 2017-04-15 RX ADMIN — CLINDAMYCIN HYDROCHLORIDE 450 MG: 150 CAPSULE ORAL at 00:35

## 2017-04-15 RX ADMIN — Medication 7.5 MG: at 20:51

## 2017-04-15 RX ADMIN — ALBUTEROL SULFATE 2.5 MG: 2.5 SOLUTION RESPIRATORY (INHALATION) at 09:46

## 2017-04-15 RX ADMIN — HYDROXYZINE PAMOATE 50 MG: 50 CAPSULE ORAL at 21:01

## 2017-04-15 RX ADMIN — GABAPENTIN 400 MG: 400 CAPSULE ORAL at 08:42

## 2017-04-15 RX ADMIN — PAROXETINE HYDROCHLORIDE 60 MG: 20 TABLET, FILM COATED ORAL at 08:43

## 2017-04-15 RX ADMIN — CLINDAMYCIN HYDROCHLORIDE 450 MG: 150 CAPSULE ORAL at 08:42

## 2017-04-15 RX ADMIN — LAMOTRIGINE 25 MG: 25 TABLET ORAL at 20:51

## 2017-04-15 RX ADMIN — HYDROXYZINE PAMOATE 50 MG: 50 CAPSULE ORAL at 10:00

## 2017-04-15 RX ADMIN — GABAPENTIN 400 MG: 400 CAPSULE ORAL at 16:13

## 2017-04-15 RX ADMIN — CLINDAMYCIN HYDROCHLORIDE 450 MG: 150 CAPSULE ORAL at 16:12

## 2017-04-15 NOTE — PROGRESS NOTES
4/15/2017    Chief Complaint: I feel better, working on problems.    Subjective:  Patient is a 46 y.o. female who was hospitalized for OD, deression, anxiety.   Since yesterday the patient has been feeling better.  Patient reports that level of hopefulness is improved due to making plans ro resolve her stressor..  Patient reports medications are helping since I am improving and able to plan  To resolve my issues leading to the current situation..  Further history reported that she never thought she will do this to herself since her son's father did it to himself.  But understressful circumstances, she did it and now regrets.    Objective     Vital Signs    Temp:  [98.5 °F (36.9 °C)] 98.5 °F (36.9 °C)  Heart Rate:  [68-86] 86  Resp:  [16-20] 16  BP: ()/(56-71) 120/71    Physical Exam:   General Appearance: Alert, oriented x3,  Hygiene:   fair  Gait & Station: Normal  Musculoskeletal: No distress    Mental Status Exam:   Cooperation:  Cooperative  Eye Contact:  Good  Psychomotor Behavior:  Normal  Mood: fair, better  Affect:  Euthymic  Speech:  Coherent, articulate  Thought Process:  Linear, goal directed  Associations: Normal  Thought Content:        Suicidal:  Denies   Homicidal:  Denies   Hallucinations:  Denies   Delusion:  None  Cognitive Functioning:   Intact  Reliability:  Fair  Insight:  Improved  Judgement:  fair  Impulse Control:  Fair    Lab Results (last 24 hours)     ** No results found for the last 24 hours. **        Imaging Results (last 24 hours)     ** No results found for the last 24 hours. **          Medicine:   Current Facility-Administered Medications:   •  acetaminophen (TYLENOL) tablet 650 mg, 650 mg, Oral, Q4H PRN, Ilene Jimenez MD, 650 mg at 04/12/17 2039  •  albuterol (PROVENTIL) nebulizer solution 0.5% 2.5 mg/0.5mL, 2.5 mg, Nebulization, Q6H PRN, Suresh Kohli MD, 2.5 mg at 04/15/17 0946  •  aluminum-magnesium hydroxide-simethicone (MAALOX/MYLANTA) suspension 30 mL, 30 mL,  Oral, Q6H PRN, Ilene Jimenez MD  •  clindamycin (CLEOCIN) capsule 450 mg, 450 mg, Oral, Q8H, Ilene Jimenez MD, 450 mg at 04/15/17 0842  •  CloNIDine (CATAPRES) tablet 0.1 mg, 0.1 mg, Oral, Q4H PRN, Ilene Jimenez MD  •  docusate sodium (COLACE) capsule 100 mg, 100 mg, Oral, BID PRN, Ilene Jimenez MD, 100 mg at 04/14/17 1238  •  gabapentin (NEURONTIN) capsule 400 mg, 400 mg, Oral, TID, Ilene Jimenez MD, 400 mg at 04/15/17 0842  •  hydrOXYzine (VISTARIL) capsule 50 mg, 50 mg, Oral, Q6H PRN, Ilene Jimenez MD, 50 mg at 04/15/17 1000  •  lamoTRIgine (LaMICtal) tablet 25 mg, 25 mg, Oral, Nightly, ANDRIY Hallman  •  loperamide (IMODIUM) capsule 2 mg, 2 mg, Oral, 4x Daily PRN, Ilene Jimenez MD, 2 mg at 04/12/17 2039  •  magnesium hydroxide (MILK OF MAGNESIA) suspension 2400 mg/10mL 10 mL, 10 mL, Oral, Daily PRN, Ilene Jimenez MD  •  mirtazapine (REMERON) half tablet 7.5 mg, 7.5 mg, Oral, Nightly, ANDRIY Hallman, 7.5 mg at 04/14/17 2133  •  nicotine (NICODERM CQ) 21 MG/24HR patch 1 patch, 1 patch, Transdermal, Q24H, Ilene Jimenez MD, 1 patch at 04/13/17 0831  •  [COMPLETED] PARoxetine (PAXIL) tablet 20 mg, 20 mg, Oral, Daily, 20 mg at 04/13/17 1533 **FOLLOWED BY** [COMPLETED] PARoxetine (PAXIL) tablet 40 mg, 40 mg, Oral, Daily, 40 mg at 04/14/17 0842 **FOLLOWED BY** PARoxetine (PAXIL) tablet 60 mg, 60 mg, Oral, Daily, Ilene Jimenez MD, 60 mg at 04/15/17 0843  •  traZODone (DESYREL) tablet 50 mg, 50 mg, Oral, Nightly PRN, Ilene Jimenez MD, 50 mg at 04/14/17 0002    Diagnoses/Assessment:   Principal Problem:    Post traumatic stress disorder (PTSD)  Active Problems:    Suicide attempt    Amphetamine-type substance use disorder, severe, dependence    Severe episode of recurrent major depressive disorder, without psychotic features      Treatment Plan:    1) Will continue care for the patient on the behavioral health unit at [facility]  to ensure patient safety.  2) Will continue to provide treatment with the unit milieu, activities, therapies and psychopharmacological management.  3) Patient to be placed on or continued on  15 min checks  and Suicidal precautions precautions.  4) Will continue medical management by Dr. Zabala.  5) Will order following labs:None  6) Will make the following medication changes: None  7) Will continue discharge planning as appropriate for patient.  8) Psychotherapy provided Supportive    Treatment plan and medication risks and benefits discussed with the patient.    Linda Chavez MD  04/15/17  11:05 AM

## 2017-04-15 NOTE — NURSING NOTE
Behavior   Anxiety 3  Depression 2  Pain 0  AVH no  S/I no  H/I no            Intervention  Instructed in med usage and effects, encouraged to verbalize needs. Meds administered as ordered. Pt. Continues to be monitored as ordered per pt. Care plan.          Response  Verbalized understanding. Pt. Has been talking to staff she has been more open today. She talked about her childhood and how she was lonely a lot and how her family had chaos. She did not want to attend group at first, she said she did not want to isolate herself but that she did not want to, but she finally did go. Once she was there she was talking to other pts. And was kind. She wanted her PRN neb. And she received it.          Plan  Continue to monitor for safety/  every 15 minute monitoring checks. Pt. Continues to be monitored as ordered per pt. Care plan.

## 2017-04-15 NOTE — PLAN OF CARE
Problem: BH Patient Care Overview (Adult)  Goal: Plan of Care Review  Outcome: Ongoing (interventions implemented as appropriate)  Goal: Individualization and Mutuality  Outcome: Ongoing (interventions implemented as appropriate)  Goal: Discharge Needs Assessment  Outcome: Ongoing (interventions implemented as appropriate)    Problem: BH Overarching Goals  Goal: Adheres to Safety Considerations for Self and Others  Outcome: Ongoing (interventions implemented as appropriate)  Goal: Optimized Coping Skills in Response to Life Stressors  Outcome: Ongoing (interventions implemented as appropriate)  Goal: Develops/Participates in Therapeutic [place] to Support Successful Transition  Outcome: Ongoing (interventions implemented as appropriate)

## 2017-04-15 NOTE — NURSING NOTE
"Behavior  Anxiety 2 with 10 being the worst.   Depression 2 with 10 being the worst.   SI no  HI no  AVH no    1:1 with patient completed. Patient is anxious, depressed, calm and cooperative in the patient's room. Patient makes good eye contact and is interacting appropriaely with staff.  Patient states \"Today was very tiring.\" Patient reports feeling the medication she took the night before caused this.  Patient reportedly relayed this to Eleonora ASHRAF and tells this RN she is supposed to have Vistaril PRN tonight to help with sleep.          Intervention  Instructed in medication usage and effects. Medications administered as ordered. Patient encouraged to notify staff of any needs, increased/uncontrolled anxiety/depression, or thoughts to harm self or others.       Response  Patient is agreeable and verbalizes understanding.         Plan  Support offered and will continue to monitor. Q15 minute checks for safety.     "

## 2017-04-15 NOTE — NURSING NOTE
Pt. Attended groups and has been around others. Pt. Has been in her room reading a book. She has been talkative about how she use to be a . She clearly has a flat affect and is depressed. She will talk but you can tell she is sad and doesn't laugh or make jokes. She enjoys sitting in the dark and being to herself she says.

## 2017-04-15 NOTE — PLAN OF CARE
Problem: BH Patient Care Overview (Adult)  Goal: Plan of Care Review  Outcome: Ongoing (interventions implemented as appropriate)  Goal: Individualization and Mutuality  Outcome: Ongoing (interventions implemented as appropriate)  Goal: Discharge Needs Assessment  Outcome: Ongoing (interventions implemented as appropriate)    Problem: BH Overarching Goals  Goal: Adheres to Safety Considerations for Self and Others  Outcome: Ongoing (interventions implemented as appropriate)  Goal: Optimized Coping Skills in Response to Life Stressors  Outcome: Ongoing (interventions implemented as appropriate)  Goal: Develops/Participates in Therapeutic [place] to Support Successful Transition  Outcome: Ongoing (interventions implemented as appropriate)

## 2017-04-16 RX ORDER — BISACODYL 10 MG
10 SUPPOSITORY, RECTAL RECTAL DAILY
Status: DISCONTINUED | OUTPATIENT
Start: 2017-04-16 | End: 2017-04-17 | Stop reason: HOSPADM

## 2017-04-16 RX ORDER — MIRTAZAPINE 15 MG/1
15 TABLET, FILM COATED ORAL NIGHTLY
Status: DISCONTINUED | OUTPATIENT
Start: 2017-04-16 | End: 2017-04-17 | Stop reason: HOSPADM

## 2017-04-16 RX ORDER — BISACODYL 5 MG/1
10 TABLET, DELAYED RELEASE ORAL DAILY PRN
Status: DISCONTINUED | OUTPATIENT
Start: 2017-04-16 | End: 2017-04-17 | Stop reason: HOSPADM

## 2017-04-16 RX ADMIN — PAROXETINE HYDROCHLORIDE 60 MG: 20 TABLET, FILM COATED ORAL at 08:00

## 2017-04-16 RX ADMIN — HYDROXYZINE PAMOATE 50 MG: 50 CAPSULE ORAL at 10:21

## 2017-04-16 RX ADMIN — HYDROXYZINE PAMOATE 50 MG: 50 CAPSULE ORAL at 21:16

## 2017-04-16 RX ADMIN — MIRTAZAPINE 15 MG: 15 TABLET, FILM COATED ORAL at 21:11

## 2017-04-16 RX ADMIN — GABAPENTIN 400 MG: 400 CAPSULE ORAL at 16:50

## 2017-04-16 RX ADMIN — GABAPENTIN 400 MG: 400 CAPSULE ORAL at 08:31

## 2017-04-16 RX ADMIN — CLINDAMYCIN HYDROCHLORIDE 450 MG: 150 CAPSULE ORAL at 07:57

## 2017-04-16 RX ADMIN — CLINDAMYCIN HYDROCHLORIDE 450 MG: 150 CAPSULE ORAL at 16:50

## 2017-04-16 RX ADMIN — BISACODYL 10 MG: 5 TABLET, COATED ORAL at 10:22

## 2017-04-16 RX ADMIN — TRAZODONE HYDROCHLORIDE 50 MG: 50 TABLET ORAL at 22:00

## 2017-04-16 RX ADMIN — CLINDAMYCIN HYDROCHLORIDE 450 MG: 150 CAPSULE ORAL at 02:16

## 2017-04-16 RX ADMIN — BISACODYL 10 MG: 10 SUPPOSITORY RECTAL at 10:22

## 2017-04-16 RX ADMIN — GABAPENTIN 400 MG: 400 CAPSULE ORAL at 21:10

## 2017-04-16 RX ADMIN — NICOTINE 1 PATCH: 21 PATCH TRANSDERMAL at 01:58

## 2017-04-16 RX ADMIN — LAMOTRIGINE 25 MG: 25 TABLET ORAL at 21:10

## 2017-04-16 NOTE — PROGRESS NOTES
4/16/2017    Chief Complaint: Sleep problem    Subjective:  Patient is a 46 y.o. female who was hospitalized for problem with sleep.   Since yesterday the patient has been feeling fine but not sleeping well  Patient reports that level of hopefulness is fairly good.  Patient reports medications are good except for sleep.  Further history reported feeling better. Needs better sleep.    Objective     Vital Signs    Temp:  [97.7 °F (36.5 °C)-98.5 °F (36.9 °C)] 97.7 °F (36.5 °C)  Heart Rate:  [86-95] 86  Resp:  [16-18] 18  BP: (114-125)/(81-82) 125/82    Physical Exam:   General Appearance: neat and alert,  Hygiene:   neat  Gait & Station: normal  Musculoskeletal: normal    Mental Status Exam:   Cooperation:  Cooperative  Eye Contact:  fair  Psychomotor Behavior:  Normal  Mood: fair but sleep problem  Affect:  euthymic  Speech:  clear  Thought Process:  linear  Associations: normal  Thought Content:        Suicidal:  denies   Homicidal:  Denies   Hallucinations:  denies   Delusion:  Denies  Cognitive Functioning:   intact  Reliability:  fair  Insight:  fair  Judgement:  fair  Impulse Control:  fair    Lab Results (last 24 hours)     ** No results found for the last 24 hours. **        Imaging Results (last 24 hours)     ** No results found for the last 24 hours. **          Medicine:   Current Facility-Administered Medications:   •  acetaminophen (TYLENOL) tablet 650 mg, 650 mg, Oral, Q4H PRN, Ilene Jimenez MD, 650 mg at 04/12/17 2039  •  albuterol (PROVENTIL) nebulizer solution 0.5% 2.5 mg/0.5mL, 2.5 mg, Nebulization, Q6H PRN, Suresh Kohli MD, 2.5 mg at 04/15/17 0946  •  aluminum-magnesium hydroxide-simethicone (MAALOX/MYLANTA) suspension 30 mL, 30 mL, Oral, Q6H PRN, Ilene Jimenez MD  •  bisacodyl (DULCOLAX) EC tablet 10 mg, 10 mg, Oral, Daily PRN, Suresh Kohli MD, 10 mg at 04/16/17 1022  •  bisacodyl (DULCOLAX) suppository 10 mg, 10 mg, Rectal, Daily, Suresh Kohli MD, 10 mg at 04/16/17 1022  •   clindamycin (CLEOCIN) capsule 450 mg, 450 mg, Oral, Q8H, Ilene Jimenez MD, 450 mg at 04/16/17 0757  •  CloNIDine (CATAPRES) tablet 0.1 mg, 0.1 mg, Oral, Q4H PRN, Ilene Jimenez MD  •  docusate sodium (COLACE) capsule 100 mg, 100 mg, Oral, BID PRN, Ilene Jimenez MD, 100 mg at 04/14/17 1238  •  gabapentin (NEURONTIN) capsule 400 mg, 400 mg, Oral, TID, Ilene Jimenez MD, 400 mg at 04/16/17 0831  •  hydrOXYzine (VISTARIL) capsule 50 mg, 50 mg, Oral, Q6H PRN, Ilene Jimenez MD, 50 mg at 04/15/17 2101  •  lamoTRIgine (LaMICtal) tablet 25 mg, 25 mg, Oral, Nightly, ANDRIY Hallman, 25 mg at 04/15/17 2051  •  loperamide (IMODIUM) capsule 2 mg, 2 mg, Oral, 4x Daily PRN, Ilene Jimenez MD, 2 mg at 04/12/17 2039  •  magnesium hydroxide (MILK OF MAGNESIA) suspension 2400 mg/10mL 10 mL, 10 mL, Oral, Daily PRN, Ilene Jimenez MD, 10 mL at 04/15/17 1619  •  mirtazapine (REMERON) half tablet 7.5 mg, 7.5 mg, Oral, Nightly, Eleonora Earl APRN, 7.5 mg at 04/15/17 2051  •  nicotine (NICODERM CQ) 21 MG/24HR patch 1 patch, 1 patch, Transdermal, Q24H, Ilene Jimenez MD, 1 patch at 04/13/17 0831  •  [COMPLETED] PARoxetine (PAXIL) tablet 20 mg, 20 mg, Oral, Daily, 20 mg at 04/13/17 1533 **FOLLOWED BY** [COMPLETED] PARoxetine (PAXIL) tablet 40 mg, 40 mg, Oral, Daily, 40 mg at 04/14/17 0842 **FOLLOWED BY** PARoxetine (PAXIL) tablet 60 mg, 60 mg, Oral, Daily, Ilene Jimenez MD, 60 mg at 04/16/17 0800  •  traZODone (DESYREL) tablet 50 mg, 50 mg, Oral, Nightly PRN, Ilene Jimenez MD, 50 mg at 04/14/17 0002    Diagnoses/Assessment:   Principal Problem:    Post traumatic stress disorder (PTSD)  Active Problems:    Suicide attempt    Amphetamine-type substance use disorder, severe, dependence    Severe episode of recurrent major depressive disorder, without psychotic features      Treatment Plan:    1) Will continue care for the patient on the behavioral health unit at [facility]  [place] to ensure patient safety.  2) Will continue to provide treatment with the unit milieu, activities, therapies and psychopharmacological management.  3) Patient to be placed on or continued on  15 min checks  and suicidal precautions precautions.  4) Will continue medical management by Dr. Kohli.  5) Will order following labs: none  6) Will make the following medication changes:increase remeron to 15mg po QHS  7) Will continue discharge planning as appropriate for patient.  8) Psychotherapy provided Supportive    Treatment plan and medication risks and benefits discussed with: the patient    Linda Chavez MD  04/16/17  11:17 AM

## 2017-04-16 NOTE — NURSING NOTE
Behavior   Anxiety 3  Depression 2  Pain 2  AVH no  S/I no  H/I no   Patient conversing with peers. Requesting vistaril. Calm & cooperative. Maintained eye contact.      Intervention  Instructed in med usage and effects, encouraged to verbalize needs. Meds administered as ordered.          Response  Verbalized understanding           Plan  Continue to monitor for safety/  every 15 minute monitoring checks.

## 2017-04-16 NOTE — NURSING NOTE
Behavior   Anxiety 2  Depression 0  Pain 0  AVH no  S/I no  H/I no            Intervention  Instructed in med usage and effects, encouraged to verbalize needs. Meds administered as ordered. Pt. Continues to be monitored as ordered per pt. Care plan.          Response  Verbalized understanding. Pt. Has not had any negative behaviors toward staff or others. She does say that she feels angry inside and she feels like she has a short fuse. She just feels like she cant take much more. She says she doesn't sleep well.           Plan  Continue to monitor for safety/  every 15 minute monitoring checks. Pt. Continues to be monitored as ordered per pt. Care plan.

## 2017-04-16 NOTE — PLAN OF CARE
Problem: BH Patient Care Overview (Adult)  Goal: Plan of Care Review  Outcome: Ongoing (interventions implemented as appropriate)  Goal: Individualization and Mutuality  Outcome: Ongoing (interventions implemented as appropriate)  Goal: Discharge Needs Assessment  Outcome: Ongoing (interventions implemented as appropriate)    Problem: BH Overarching Goals  Goal: Adheres to Safety Considerations for Self and Others  Outcome: Ongoing (interventions implemented as appropriate)  Goal: Optimized Coping Skills in Response to Life Stressors  Outcome: Ongoing (interventions implemented as appropriate)  Goal: Develops/Participates in Therapeutic [place] to Support Successful Transition  Outcome: Ongoing (interventions implemented as appropriate)

## 2017-04-16 NOTE — NURSING NOTE
Pt. Has been talking to others but is not in the group like the others. She tends to stay to herself. She has not been upset with others. She has not had any complaints. She says that her  is going to visit at visitation. Her affect is flat. When she talks she noemy like day dreams at times, then gets back to topic. It is not long that she does that but she is soft and noemy slow with speech at times. She is not negative at staff.

## 2017-04-16 NOTE — PLAN OF CARE
Problem: BH Patient Care Overview (Adult)  Goal: Plan of Care Review  Outcome: Ongoing (interventions implemented as appropriate)  Goal: Individualization and Mutuality  Outcome: Ongoing (interventions implemented as appropriate)  Goal: Discharge Needs Assessment  Outcome: Ongoing (interventions implemented as appropriate)    Problem: BH Overarching Goals  Goal: Adheres to Safety Considerations for Self and Others  Outcome: Ongoing (interventions implemented as appropriate)  Goal: Optimized Coping Skills in Response to Life Stressors  Outcome: Ongoing (interventions implemented as appropriate)  Goal: Develops/Participates in Therapeutic [place] to Support Successful Transition  Outcome: Ongoing (interventions implemented as appropriate)

## 2017-04-17 VITALS
WEIGHT: 211.2 LBS | SYSTOLIC BLOOD PRESSURE: 128 MMHG | RESPIRATION RATE: 20 BRPM | HEIGHT: 62 IN | HEART RATE: 96 BPM | TEMPERATURE: 96.3 F | DIASTOLIC BLOOD PRESSURE: 66 MMHG | OXYGEN SATURATION: 95 % | BODY MASS INDEX: 38.87 KG/M2

## 2017-04-17 PROCEDURE — 99238 HOSP IP/OBS DSCHRG MGMT 30/<: CPT | Performed by: PSYCHIATRY & NEUROLOGY

## 2017-04-17 RX ORDER — MIRTAZAPINE 30 MG/1
30 TABLET, FILM COATED ORAL NIGHTLY
Qty: 30 TABLET | Refills: 0 | Status: ON HOLD | OUTPATIENT
Start: 2017-04-17 | End: 2017-05-09

## 2017-04-17 RX ORDER — LAMOTRIGINE 25 MG/1
TABLET ORAL
Qty: 38 TABLET | Refills: 0 | Status: SHIPPED | OUTPATIENT
Start: 2017-04-17 | End: 2017-05-09 | Stop reason: HOSPADM

## 2017-04-17 RX ORDER — PAROXETINE 30 MG/1
60 TABLET, FILM COATED ORAL DAILY
Qty: 60 TABLET | Refills: 0 | Status: ON HOLD | OUTPATIENT
Start: 2017-04-17 | End: 2017-05-09

## 2017-04-17 RX ADMIN — HYDROXYZINE PAMOATE 50 MG: 50 CAPSULE ORAL at 13:33

## 2017-04-17 RX ADMIN — PAROXETINE HYDROCHLORIDE 60 MG: 20 TABLET, FILM COATED ORAL at 08:42

## 2017-04-17 RX ADMIN — BISACODYL 10 MG: 10 SUPPOSITORY RECTAL at 12:33

## 2017-04-17 RX ADMIN — GABAPENTIN 400 MG: 400 CAPSULE ORAL at 08:42

## 2017-04-17 RX ADMIN — NICOTINE 1 PATCH: 21 PATCH TRANSDERMAL at 08:42

## 2017-04-17 NOTE — PLAN OF CARE
Problem: BH Overarching Goals  Goal: Adheres to Safety Considerations for Self and Others  Outcome: Ongoing (interventions implemented as appropriate)  Goal: Optimized Coping Skills in Response to Life Stressors  Outcome: Ongoing (interventions implemented as appropriate)  Goal: Develops/Participates in Therapeutic [place] to Support Successful Transition  Outcome: Ongoing (interventions implemented as appropriate)    Problem: Depression  Goal: Treatment Goal: Demonstrate behavioral control of depressive symptoms, verbalize feelings of improved mood/affect, and adopt new coping skills prior to discharge  Outcome: Ongoing (interventions implemented as appropriate)  Goal: Verbalize thoughts and feelings associated with:  Outcome: Ongoing (interventions implemented as appropriate)  Goal: Refrain from harming self  Outcome: Ongoing (interventions implemented as appropriate)  Goal: Refrain from isolation  Outcome: Ongoing (interventions implemented as appropriate)  Goal: Refrain from self-neglect  Outcome: Ongoing (interventions implemented as appropriate)  Goal: Attend and participate in unit activities, including therapeutic, recreational, and educational groups  Outcome: Ongoing (interventions implemented as appropriate)  Goal: Complete daily ADLs, including personal hygiene independently, as able  Outcome: Ongoing (interventions implemented as appropriate)    Problem: Risk for Self Injury/Neglect  Goal: Treatment Goal: Remain safe during length of stay, learn and adopt new coping skills, and be free of self-injurious ideation, impulses and acts at the time of discharge  Outcome: Ongoing (interventions implemented as appropriate)  Goal: Verbalize thoughts and feelings associated with:  Outcome: Ongoing (interventions implemented as appropriate)  Goal: Refrain from harming self  Outcome: Ongoing (interventions implemented as appropriate)  Goal: Attend and participate in unit activities, including therapeutic,  recreational, and educational groups  Outcome: Ongoing (interventions implemented as appropriate)  Pt attends all groups.  Goal: Recognize maladaptive responses and adopt new coping mechanisms  Outcome: Ongoing (interventions implemented as appropriate)  Goal: Complete daily ADLs, including personal hygiene independently, as able  Outcome: Ongoing (interventions implemented as appropriate)  Pt is independent with ADLs.     Problem: SUBSTANCE USE/ABUSE  Goal: By day 5 will complete medical detox and be discharged with an appropriate treatment plan in place.  Outcome: Ongoing (interventions implemented as appropriate)  Goal: By discharge, will develop insight into their chemical dependency and sustain motivation to continue in recovery.  Outcome: Ongoing (interventions implemented as appropriate)  Goal: By discharge, will have in place an ongoing treatment plan in collaboration with assigned CDP.  Outcome: Ongoing (interventions implemented as appropriate)
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
## 2017-04-17 NOTE — NURSING NOTE
Behavior   Anxiety 1  Depression 0  Pain 0  AVH no  S/I no  H/I no   Pt makes fair eye contact. Speech is clear and relevant. Pt displays good sense of humor.            Intervention  Instructed in med usage and effects, encouraged to verbalize needs. Meds administered as ordered. RN encouraged self expression. RN discussed with pt how treatment is going.           Response  Verbalized understanding. Pt wants to hold suppository until after lunch to see if she has a good BM this morning.  Pt seems much brighter and is laughing.  Pt is appropriate.           Plan  Continue to monitor for safety/  every 15 minute monitoring checks.  RN will assess and educate as needed.

## 2017-04-17 NOTE — NURSING NOTE
Pt received all belongings from security, bedside, and pharmacy. Pt received d/c instructions inlcuding info on meds, diagnosis, and follow-ups.  Pt received number for Crisis Hotline. Pt instructed to report to nearest ER for new or worsening symptoms. Pt verbalized understanding. Pt left ambulatory with all belongings at this time in NAD.  Pt discharged to home with outpatient treatment.
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
## 2017-04-17 NOTE — PLAN OF CARE
Problem: BH Patient Care Overview (Adult)  Goal: Plan of Care Review  Outcome: Ongoing (interventions implemented as appropriate)  Goal: Individualization and Mutuality  Outcome: Ongoing (interventions implemented as appropriate)  Goal: Discharge Needs Assessment  Outcome: Ongoing (interventions implemented as appropriate)    Problem: BH Overarching Goals  Goal: Adheres to Safety Considerations for Self and Others  Outcome: Ongoing (interventions implemented as appropriate)  Goal: Optimized Coping Skills in Response to Life Stressors  Outcome: Ongoing (interventions implemented as appropriate)  Goal: Develops/Participates in Therapeutic [place] to Support Successful Transition  Outcome: Ongoing (interventions implemented as appropriate)
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
## 2017-04-17 NOTE — PROGRESS NOTES
Patient has participated in all Recreation Therapy groups and made good progress toward goals.   Patient reports she is feeling better and more positive about the future.

## 2017-04-18 NOTE — DISCHARGE PLACEMENT REQUEST
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 Times Daily PRN 4/12/2017 4/17/2017    Sig - Route: Take 1 capsule by mouth 2 (Two) Times a Day As Needed for Constipation. - Oral    Reason for Discontinue: Patient Discharge    gabapentin (NEURONTIN) capsule 400 mg (Discontinued) 400 mg 3 Times Daily 4/13/2017 4/17/2017    Sig - Route: Take 1 capsule by mouth 3 (Three) Times a Day. - Oral    Reason for Discontinue: Patient Discharge    hydrOXYzine (VISTARIL) capsule 50 mg (Discontinued) 50 mg Every 6 Hours PRN 4/12/2017 4/17/2017    Sig - Route: Take 1 capsule by mouth Every 6 (Six) Hours As Needed for Anxiety. - Oral    Reason for Discontinue: Patient Discharge    lamoTRIgine (LaMICtal) tablet 25 mg (Discontinued) 25 mg Daily 4/13/2017 4/14/2017    Sig - Route: Take 1 tablet by mouth Daily. - Oral    lamoTRIgine (LaMICtal) tablet 25 mg (Discontinued) 25 mg Nightly 4/15/2017 4/17/2017    Sig - Route: Take 1 tablet by mouth Every Night. - Oral     "Reason for Discontinue: Patient Discharge    loperamide (IMODIUM) capsule 2 mg (Discontinued) 2 mg 4 Times Daily PRN 4/12/2017 4/17/2017    Sig - Route: Take 1 capsule by mouth 4 (Four) Times a Day As Needed for Diarrhea. - Oral    Reason for Discontinue: Patient Discharge    magnesium hydroxide (MILK OF MAGNESIA) suspension 2400 mg/10mL 10 mL (Discontinued) 10 mL Daily PRN 4/12/2017 4/17/2017    Sig - Route: Take 10 mL by mouth Daily As Needed for Constipation. - Oral    Reason for Discontinue: Patient Discharge    mirtazapine (REMERON) half tablet 7.5 mg (Discontinued) 7.5 mg Nightly 4/14/2017 4/16/2017    Sig - Route: Take 1 half tablet by mouth Every Night. - Oral    mirtazapine (REMERON) tablet 15 mg (Discontinued) 15 mg Nightly 4/13/2017 4/14/2017    Sig - Route: Take 1 tablet by mouth Every Night. - Oral    mirtazapine (REMERON) tablet 15 mg (Discontinued) 15 mg Nightly 4/16/2017 4/17/2017    Sig - Route: Take 1 tablet by mouth Every Night. - Oral    Reason for Discontinue: Patient Discharge    nicotine (NICODERM CQ) 21 MG/24HR patch 1 patch (Discontinued) 1 patch Every 24 Hours 4/12/2017 4/17/2017    Sig - Route: Place 1 patch on the skin Daily. - Transdermal    Reason for Discontinue: Patient Discharge    PARoxetine (PAXIL) tablet 60 mg (Discontinued) 60 mg Daily 4/15/2017 4/17/2017    Sig - Route: Take 3 tablets by mouth Daily. - Oral    Reason for Discontinue: Patient Discharge    Linked Group 1:  \"Followed by\" Linked Group Details        traZODone (DESYREL) tablet 50 mg (Discontinued) 50 mg Nightly PRN 4/12/2017 4/17/2017    Sig - Route: Take 1 tablet by mouth At Night As Needed for Sleep (insomnia). - Oral    Reason for Discontinue: Patient Discharge             Physician Progress Notes (all)      ANDRIY Hallman at 4/14/2017  5:33 PM  Version 1 of 1               4/14/2017    Chief Complaint: suicide attempt and depression    Subjective:  Patient is a 46 y.o. female who was hospitalized for " "suicidal ideation, suicide attempt, anxiety and depression.   Since yesterday the patient has been continuing to  improved.  Has been having symptoms including anxiety, but without behavioral disturbance and/or aggression and self injury. Patient reports that level of hopefulness is good.  Medications are effective.  Further history reported: she states she is doing well and has a plan to go to [place] and start a residential program there. She reports not sleeping well and that the lamictal causes drowsiness. She states \"I want you to know I'm not playing and I have a plan.\" She has been attending groups. Affect is blunted. Seems to have some difficulty formulating and expressing thoughts. States she has taken Doxepin in the past and did not help with insomnia. Denies nightmares or flashback from trauma.    Objective     Vital Signs    BP 93/56 (BP Location: Right arm, Patient Position: Sitting)  Pulse 84  Temp 98.5 °F (36.9 °C) (Tympanic)   Resp 20  Ht 62\" (157.5 cm)  Wt 210 lb 11.2 oz (95.6 kg)  SpO2 94%  BMI 38.54 kg/m2    Physical Exam:   General Appearance: alert, appears stated age and cooperative,  Hygiene:   good  Gait & Station: Normal  Musculoskeletal: No tremors or abnormal involuntary movements    Mental Status Exam:   Cooperation:  Cooperative  Eye Contact:  Good  Psychomotor Behavior:  Appropriate  Mood: Anxious/Nervous and Worried  Affect:  blunted  Speech:  Monotone  Thought Process:  Coherent  Associations: Goal Directed  Thought Content:     Normal   Suicidal:  None   Homicidal:  None   Hallucinations:  None   Delusion:  None  Cognitive Functioning:   Consciousness: awake and alert  Reliability:  good  Insight:  Fair  Judgement:  Fair  Impulse Control:  Fair    Lab Results (last 24 hours)     ** No results found for the last 24 hours. **        Imaging Results (last 24 hours)     ** No results found for the last 24 hours. **          Medicine:   Current Facility-Administered Medications:   •  " acetaminophen (TYLENOL) tablet 650 mg, 650 mg, Oral, Q4H PRN, Ilene Jimenez MD, 650 mg at 04/12/17 2039  •  albuterol (PROVENTIL) nebulizer solution 0.5% 2.5 mg/0.5mL, 2.5 mg, Nebulization, Q6H PRN, Suresh Kohli MD, 2.5 mg at 04/14/17 1010  •  aluminum-magnesium hydroxide-simethicone (MAALOX/MYLANTA) suspension 30 mL, 30 mL, Oral, Q6H PRN, Ilene Jimenez MD  •  clindamycin (CLEOCIN) capsule 450 mg, 450 mg, Oral, Q8H, Ilene Jimenez MD, 450 mg at 04/14/17 1703  •  CloNIDine (CATAPRES) tablet 0.1 mg, 0.1 mg, Oral, Q4H PRN, Ilene Jimenez MD  •  docusate sodium (COLACE) capsule 100 mg, 100 mg, Oral, BID PRN, Ilene Jimenez MD, 100 mg at 04/14/17 1238  •  gabapentin (NEURONTIN) capsule 400 mg, 400 mg, Oral, TID, Ilene Jimenez MD, 400 mg at 04/14/17 1707  •  hydrOXYzine (VISTARIL) capsule 50 mg, 50 mg, Oral, Q6H PRN, Ilene Jimenez MD, 50 mg at 04/13/17 0835  •  [START ON 4/15/2017] lamoTRIgine (LaMICtal) tablet 25 mg, 25 mg, Oral, Nightly, ANDRIY Hallman  •  loperamide (IMODIUM) capsule 2 mg, 2 mg, Oral, 4x Daily PRN, Ilene Jimenez MD, 2 mg at 04/12/17 2039  •  magnesium hydroxide (MILK OF MAGNESIA) suspension 2400 mg/10mL 10 mL, 10 mL, Oral, Daily PRN, Ilene Jimenez MD  •  mirtazapine (REMERON) half tablet 7.5 mg, 7.5 mg, Oral, Nightly, ANDRIY Hallman  •  nicotine (NICODERM CQ) 21 MG/24HR patch 1 patch, 1 patch, Transdermal, Q24H, Ilene Jimenez MD, 1 patch at 04/13/17 0831  •  [COMPLETED] PARoxetine (PAXIL) tablet 20 mg, 20 mg, Oral, Daily, 20 mg at 04/13/17 1533 **FOLLOWED BY** [COMPLETED] PARoxetine (PAXIL) tablet 40 mg, 40 mg, Oral, Daily, 40 mg at 04/14/17 0842 **FOLLOWED BY** [START ON 4/15/2017] PARoxetine (PAXIL) tablet 60 mg, 60 mg, Oral, Daily, Ilene Jimenez MD  •  traZODone (DESYREL) tablet 50 mg, 50 mg, Oral, Nightly PRN, Ilene Jimenez MD, 50 mg at 04/14/17 0002    Diagnoses/Assessment:   Principal Problem:    Post traumatic  stress disorder (PTSD)  Active Problems:    Suicide attempt    Amphetamine-type substance use disorder, severe, dependence    Severe episode of recurrent major depressive disorder, without psychotic features      Treatment Plan:    1) Will continue care for the patient on the behavioral health unit at [facility] to ensure patient safety.  2) Will continue to provide treatment with the unit milieu, activities, therapies and psychopharmacological management.  3) Patient to be placed on or continued on every 15 minute safety checks & Suicide precautions.  4) Will continue medical management by Dr. Kohli  5) Will order following labs: no new labs  6) Will make the following medication changes: change lamictal to bedtime starting tomorrow and decrease Remeron to 7.5 to help with sleep.  7) Will continue discharge planning as appropriate for patient.  8) Psychotherapy provided for less than 16 minutes.    Treatment plan and medication risks and benefits discussed with: Patient    ANDRIY Hallman  04/14/17  5:33 PM         Electronically signed by ANDRIY Hallman at 4/14/2017  5:39 PM      Linda Chavez MD at 4/15/2017 11:05 AM  Version 1 of 1         4/15/2017    Chief Complaint: I feel better, working on problems.    Subjective:  Patient is a 46 y.o. female who was hospitalized for OD, deression, anxiety.   Since yesterday the patient has been feeling better.  Patient reports that level of hopefulness is improved due to making plans ro resolve her stressor..  Patient reports medications are helping since I am improving and able to plan  To resolve my issues leading to the current situation..  Further history reported that she never thought she will do this to herself since her son's father did it to himself.  But understressful circumstances, she did it and now regrets.    Objective     Vital Signs    Temp:  [98.5 °F (36.9 °C)] 98.5 °F (36.9 °C)  Heart Rate:  [68-86] 86  Resp:   [16-20] 16  BP: ()/(56-71) 120/71    Physical Exam:   General Appearance: Alert, oriented x3,  Hygiene:   fair  Gait & Station: Normal  Musculoskeletal: No distress    Mental Status Exam:   Cooperation:  Cooperative  Eye Contact:  Good  Psychomotor Behavior:  Normal  Mood: fair, better  Affect:  Euthymic  Speech:  Coherent, articulate  Thought Process:  Linear, goal directed  Associations: Normal  Thought Content:        Suicidal:  Denies   Homicidal:  Denies   Hallucinations:  Denies   Delusion:  None  Cognitive Functioning:   Intact  Reliability:  Fair  Insight:  Improved  Judgement:  fair  Impulse Control:  Fair    Lab Results (last 24 hours)     ** No results found for the last 24 hours. **        Imaging Results (last 24 hours)     ** No results found for the last 24 hours. **          Medicine:   Current Facility-Administered Medications:   •  acetaminophen (TYLENOL) tablet 650 mg, 650 mg, Oral, Q4H PRN, Ilene Jimenez MD, 650 mg at 04/12/17 2039  •  albuterol (PROVENTIL) nebulizer solution 0.5% 2.5 mg/0.5mL, 2.5 mg, Nebulization, Q6H PRN, Suresh Kohli MD, 2.5 mg at 04/15/17 0946  •  aluminum-magnesium hydroxide-simethicone (MAALOX/MYLANTA) suspension 30 mL, 30 mL, Oral, Q6H PRN, Ilene Jimenez MD  •  clindamycin (CLEOCIN) capsule 450 mg, 450 mg, Oral, Q8H, Ilene Jimenez MD, 450 mg at 04/15/17 0842  •  CloNIDine (CATAPRES) tablet 0.1 mg, 0.1 mg, Oral, Q4H PRN, Ilene Jimenez MD  •  docusate sodium (COLACE) capsule 100 mg, 100 mg, Oral, BID PRN, Ilene Jimenez MD, 100 mg at 04/14/17 1238  •  gabapentin (NEURONTIN) capsule 400 mg, 400 mg, Oral, TID, Ilene Jimenez MD, 400 mg at 04/15/17 0842  •  hydrOXYzine (VISTARIL) capsule 50 mg, 50 mg, Oral, Q6H PRN, Ilene Jimenez MD, 50 mg at 04/15/17 1000  •  lamoTRIgine (LaMICtal) tablet 25 mg, 25 mg, Oral, Nightly, ANDRIY Hallman  •  loperamide (IMODIUM) capsule 2 mg, 2 mg, Oral, 4x Daily PRN, Ilene Jimenez MD, 2  mg at 04/12/17 2039  •  magnesium hydroxide (MILK OF MAGNESIA) suspension 2400 mg/10mL 10 mL, 10 mL, Oral, Daily PRN, Ilene Jimenez MD  •  mirtazapine (REMERON) half tablet 7.5 mg, 7.5 mg, Oral, Nightly, Eleonora Eral, APRN, 7.5 mg at 04/14/17 2133  •  nicotine (NICODERM CQ) 21 MG/24HR patch 1 patch, 1 patch, Transdermal, Q24H, Ilene Jimenez MD, 1 patch at 04/13/17 0831  •  [COMPLETED] PARoxetine (PAXIL) tablet 20 mg, 20 mg, Oral, Daily, 20 mg at 04/13/17 1533 **FOLLOWED BY** [COMPLETED] PARoxetine (PAXIL) tablet 40 mg, 40 mg, Oral, Daily, 40 mg at 04/14/17 0842 **FOLLOWED BY** PARoxetine (PAXIL) tablet 60 mg, 60 mg, Oral, Daily, Ilene Jimenez MD, 60 mg at 04/15/17 0843  •  traZODone (DESYREL) tablet 50 mg, 50 mg, Oral, Nightly PRN, Ilene Jimenez MD, 50 mg at 04/14/17 0002    Diagnoses/Assessment:   Principal Problem:    Post traumatic stress disorder (PTSD)  Active Problems:    Suicide attempt    Amphetamine-type substance use disorder, severe, dependence    Severe episode of recurrent major depressive disorder, without psychotic features      Treatment Plan:    1) Will continue care for the patient on the behavioral health unit at [facility] to ensure patient safety.  2) Will continue to provide treatment with the unit milieu, activities, therapies and psychopharmacological management.  3) Patient to be placed on or continued on  15 min checks  and Suicidal precautions precautions.  4) Will continue medical management by Dr. Zabala.  5) Will order following labs:None  6) Will make the following medication changes: None  7) Will continue discharge planning as appropriate for patient.  8) Psychotherapy provided Supportive    Treatment plan and medication risks and benefits discussed with the patient.    Linda Chavez MD  04/15/17  11:05 AM     Electronically signed by Linda Chavez MD at 4/15/2017 11:17 AM      Linda Chavez MD at 4/16/2017 11:16 AM  Version 1 of 1          4/16/2017    Chief Complaint: Sleep problem    Subjective:  Patient is a 46 y.o. female who was hospitalized for problem with sleep.   Since yesterday the patient has been feeling fine but not sleeping well  Patient reports that level of hopefulness is fairly good.  Patient reports medications are good except for sleep.  Further history reported feeling better. Needs better sleep.    Objective     Vital Signs    Temp:  [97.7 °F (36.5 °C)-98.5 °F (36.9 °C)] 97.7 °F (36.5 °C)  Heart Rate:  [86-95] 86  Resp:  [16-18] 18  BP: (114-125)/(81-82) 125/82    Physical Exam:   General Appearance: neat and alert,  Hygiene:   neat  Gait & Station: normal  Musculoskeletal: normal    Mental Status Exam:   Cooperation:  Cooperative  Eye Contact:  fair  Psychomotor Behavior:  Normal  Mood: fair but sleep problem  Affect:  euthymic  Speech:  clear  Thought Process:  linear  Associations: normal  Thought Content:        Suicidal:  denies   Homicidal:  Denies   Hallucinations:  denies   Delusion:  Denies  Cognitive Functioning:   intact  Reliability:  fair  Insight:  fair  Judgement:  fair  Impulse Control:  fair    Lab Results (last 24 hours)     ** No results found for the last 24 hours. **        Imaging Results (last 24 hours)     ** No results found for the last 24 hours. **          Medicine:   Current Facility-Administered Medications:   •  acetaminophen (TYLENOL) tablet 650 mg, 650 mg, Oral, Q4H PRN, Ilene Jimenez MD, 650 mg at 04/12/17 2039  •  albuterol (PROVENTIL) nebulizer solution 0.5% 2.5 mg/0.5mL, 2.5 mg, Nebulization, Q6H PRN, Suresh Kohli MD, 2.5 mg at 04/15/17 0946  •  aluminum-magnesium hydroxide-simethicone (MAALOX/MYLANTA) suspension 30 mL, 30 mL, Oral, Q6H PRN, Ilene Jimenez MD  •  bisacodyl (DULCOLAX) EC tablet 10 mg, 10 mg, Oral, Daily PRN, Suresh Kohli MD, 10 mg at 04/16/17 1022  •  bisacodyl (DULCOLAX) suppository 10 mg, 10 mg, Rectal, Daily, Suresh Kohli MD, 10 mg at 04/16/17  1022  •  clindamycin (CLEOCIN) capsule 450 mg, 450 mg, Oral, Q8H, Ilene Jimenez MD, 450 mg at 04/16/17 0757  •  CloNIDine (CATAPRES) tablet 0.1 mg, 0.1 mg, Oral, Q4H PRN, Ilene Jimenez MD  •  docusate sodium (COLACE) capsule 100 mg, 100 mg, Oral, BID PRN, Ilene Jimenez MD, 100 mg at 04/14/17 1238  •  gabapentin (NEURONTIN) capsule 400 mg, 400 mg, Oral, TID, Ilene Jimenez MD, 400 mg at 04/16/17 0831  •  hydrOXYzine (VISTARIL) capsule 50 mg, 50 mg, Oral, Q6H PRN, Ilene Jimenez MD, 50 mg at 04/15/17 2101  •  lamoTRIgine (LaMICtal) tablet 25 mg, 25 mg, Oral, Nightly, ANDRIY Hallman, 25 mg at 04/15/17 2051  •  loperamide (IMODIUM) capsule 2 mg, 2 mg, Oral, 4x Daily PRN, Ilene Jimenez MD, 2 mg at 04/12/17 2039  •  magnesium hydroxide (MILK OF MAGNESIA) suspension 2400 mg/10mL 10 mL, 10 mL, Oral, Daily PRN, Ilene Jimenez MD, 10 mL at 04/15/17 1619  •  mirtazapine (REMERON) half tablet 7.5 mg, 7.5 mg, Oral, Nightly, ANDRIY Hallman, 7.5 mg at 04/15/17 2051  •  nicotine (NICODERM CQ) 21 MG/24HR patch 1 patch, 1 patch, Transdermal, Q24H, Ilene Jimenez MD, 1 patch at 04/13/17 0831  •  [COMPLETED] PARoxetine (PAXIL) tablet 20 mg, 20 mg, Oral, Daily, 20 mg at 04/13/17 1533 **FOLLOWED BY** [COMPLETED] PARoxetine (PAXIL) tablet 40 mg, 40 mg, Oral, Daily, 40 mg at 04/14/17 0842 **FOLLOWED BY** PARoxetine (PAXIL) tablet 60 mg, 60 mg, Oral, Daily, Ilene Jimenez MD, 60 mg at 04/16/17 0800  •  traZODone (DESYREL) tablet 50 mg, 50 mg, Oral, Nightly PRN, Ilene Jimenez MD, 50 mg at 04/14/17 0002    Diagnoses/Assessment:   Principal Problem:    Post traumatic stress disorder (PTSD)  Active Problems:    Suicide attempt    Amphetamine-type substance use disorder, severe, dependence    Severe episode of recurrent major depressive disorder, without psychotic features      Treatment Plan:    1) Will continue care for the patient on the behavioral health unit at [facility] to ensure patient safety.  2) Will continue to provide treatment with the unit milieu, activities, therapies and psychopharmacological management.  3) Patient to be placed on or continued on  15 min checks  and suicidal precautions precautions.  4) Will continue medical management by Dr. Kohli.  5) Will order following labs: none  6) Will make the following medication changes:increase remeron to 15mg po QHS  7) Will continue discharge planning as appropriate for patient.  8) Psychotherapy provided Supportive    Treatment plan and medication risks and benefits discussed with: the patient    Linda Chavez MD  04/16/17  11:17 AM     Electronically signed by Linda Chavez MD at 4/16/2017 11:28 AM           Consult Notes (all)      Suresh Kohli MD at 4/13/2017  9:25 AM  Version 1 of 1     Consult Orders:    1. Inpatient Consult to Hospitalist [[id number]] ordered by Ilene Jimenez MD at 04/12/17 2271                  CHIEF COMPLAINT/REASON FOR VISIT:  Suicide Attempt    HPI:  Patient presented to our ED with the above complaint on April 9 at 4:06 PM.  The patient called EMS telling them she was going to take all of her Elavil and Neurontin in hopes that she would die.  In the emergency room she was extremely sedated and was intubated and admitted in the ICU to the family medicine service.  She was extubated the next day and after initially being treated with IV antibiotics was changed to by mouth clindamycin for presumed aspiration pneumonia.  Her chest x-ray supported this diagnosis.  At the time of discharge from the family medicine service she reported that she did not remember this overdose but does admit to previous overdoses and some problems with mental health in the past.  She was seen in consultation by behavioral health and then admitted here.    PROBLEM LIST:  Patient Active Problem List    Diagnosis   • Suicidal ideation [R45.851]   • Amphetamine-type substance use disorder,  severe, dependence [F15.20]   • Obesity due to excess calories [E66.09]   • Chronic hepatitis C virus infection [B18.2]     Overview Note:     Will monitor LFTs            CURRENT MEDICATIONS:  Prescriptions Prior to Admission   Medication Sig Dispense Refill Last Dose   • amitriptyline (ELAVIL) 50 MG tablet Take 50 mg by mouth Every Night.      • gabapentin (NEURONTIN) 800 MG tablet Take 800 mg by mouth 4 (Four) Times a Day.      • PARoxetine (PAXIL) 10 MG tablet Take 40 mg by mouth Every Morning.      • temazepam (RESTORIL) 15 MG capsule Take 15 mg by mouth Daily.      • clindamycin (CLEOCIN) 150 MG capsule Take 3 capsules by mouth Every 8 (Eight) Hours for 4 days. Indications: Pneumonia 36 capsule 0        ALLERGIES:  Penicillins; Seroquel [quetiapine fumarate]; Stadol [butorphanol]; and Wellbutrin [bupropion]      PAST MEDICAL/SURGICAL HISTORY:  Past Medical History:   Diagnosis Date   • Anxiety state    • Bronchitis    • Chest pain    • Depressive disorder    • Essential hypertension    • Excessive or frequent menstruation    • Hyperlipidemia    • Intermenstrual bleeding     irregular - suspect endometrial versus endocervical polyp   • Osteoarthritis of multiple joints    • PTSD (post-traumatic stress disorder)    • Suicide attempt    • Surgery follow-up    • Tobacco dependence syndrome    • Viral hepatitis C    • Wheezing    • Withdrawal symptoms, drug or narcotic        Past Surgical History:   Procedure Laterality Date   • BREAST SURGERY      reduction   •  SECTION  2005    x2   • COSMETIC SURGERY      breast reduction   • DENTAL PROCEDURE  1992    wisdom teeth   • DILATATION AND CURETTAGE      x2   • FRACTURE SURGERY     • HYSTEROSCOPY     • INCISION AND DRAINAGE ABSCESS  2013    abscess of hand, septic arthritis, metatarsophalangeal joint, right hand   • INJECTION OF MEDICATION  2016    kenalog   • NECK SURGERY  1994 &    • SUPRACERVICAL HYSTERECTOMY SALPINGO OOPHORECTOMY  "Bilateral 12/03/2013    exam under anesthesia and supracervical hysterectomy with bilateral salpingo-ooporectomy. menometrorrhagia with stage 4 endometriosis   • TONSILLECTOMY  1978   • TONSILLECTOMY         Review of Systems   Constitutional: Negative for activity change, appetite change, fatigue and fever.   HENT: Positive for sore throat. Negative for congestion, ear discharge, ear pain, facial swelling, hearing loss, nosebleeds, postnasal drip, rhinorrhea, sinus pressure, tinnitus and trouble swallowing.    Eyes: Negative for pain, discharge and visual disturbance.   Respiratory: Positive for cough. Negative for shortness of breath and wheezing.    Cardiovascular: Negative for chest pain, palpitations and leg swelling.   Gastrointestinal: Negative for abdominal pain, blood in stool, constipation, diarrhea, nausea and vomiting.   Genitourinary: Negative for difficulty urinating, dyspareunia, dysuria, flank pain, frequency, hematuria, menstrual problem, pelvic pain, urgency, vaginal bleeding and vaginal discharge.   Musculoskeletal: Negative for arthralgias, back pain, joint swelling, myalgias and neck pain.   Skin: Negative for rash and wound.   Neurological: Negative for dizziness, seizures, syncope, weakness, light-headedness and headaches.   Hematological: Negative for adenopathy.       Social History     Social History   • Marital status:      Spouse name: N/A   • Number of children: N/A   • Years of education: N/A     Occupational History   • Not on file.     Social History Main Topics   • Smoking status: Current Every Day Smoker     Packs/day: 0.50     Years: 22.00     Types: Cigarettes   • Smokeless tobacco: Never Used   • Alcohol use No      Comment: unable to assess, patient intubated at this time.    • Drug use: Yes     Special: \"Crack\" cocaine, Methamphetamines   • Sexual activity: Yes     Partners: Male     Other Topics Concern   • Not on file     Social History Narrative       History " "reviewed. No pertinent family history.          Objective     /58 (BP Location: Right arm, Patient Position: Lying)  Pulse 73  Temp 97.6 °F (36.4 °C) (Tympanic)   Resp 18  Ht 62\" (157.5 cm)  Wt 210 lb 11.2 oz (95.6 kg)  SpO2 96%  BMI 38.54 kg/m2    Physical Exam   Constitutional: She appears well-developed and well-nourished.   HENT:   Head: Normocephalic and atraumatic.   Eyes: Conjunctivae and EOM are normal.   Neck: Normal range of motion. Neck supple. No thyromegaly present.   Cardiovascular: Normal rate, regular rhythm and normal heart sounds.  Exam reveals no gallop and no friction rub.    No murmur heard.  Pulmonary/Chest: Effort normal. No respiratory distress. She has no wheezes. She has no rales.   Coarse rhonchi throughout with good air movement and no localized rales or wheezes.   Abdominal: Soft. She exhibits no distension and no mass. There is no tenderness. There is no rebound and no guarding.   Musculoskeletal: Normal range of motion.   Lymphadenopathy:     She has no cervical adenopathy.   Neurological: She is alert. She has normal strength and normal reflexes. She displays no tremor and normal reflexes. No cranial nerve deficit or sensory deficit. She exhibits normal muscle tone. Coordination normal. She displays no Babinski's sign on the right side. She displays no Babinski's sign on the left side.   Reflex Scores:       Tricep reflexes are 2+ on the right side and 2+ on the left side.       Bicep reflexes are 2+ on the right side and 2+ on the left side.       Brachioradialis reflexes are 2+ on the right side and 2+ on the left side.       Patellar reflexes are 2+ on the right side and 2+ on the left side.       Achilles reflexes are 2+ on the right side and 2+ on the left side.  Skin: Skin is warm and dry. No rash noted. No erythema.   Nursing note and vitals reviewed.      Dystonia/Tardive Dyskinesia  Absent  Meningeal Signs  Absent    Diagnostic Studies    Blood Culture [[id number]] " (Normal) Collected: 04/09/17 2102      Specimen: Blood from Arm, Left Updated: 04/11/17 2201       Blood Culture No growth at 2 days    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

## 2017-05-03 ENCOUNTER — HOSPITAL ENCOUNTER (INPATIENT)
Facility: HOSPITAL | Age: 47
LOS: 1 days | End: 2017-05-04
Attending: EMERGENCY MEDICINE | Admitting: FAMILY MEDICINE

## 2017-05-03 DIAGNOSIS — T43.624A: Primary | ICD-10-CM

## 2017-05-03 DIAGNOSIS — R41.82 ALTERED MENTAL STATUS, UNSPECIFIED: ICD-10-CM

## 2017-05-03 DIAGNOSIS — F15.921: ICD-10-CM

## 2017-05-03 DIAGNOSIS — R45.851 SUICIDAL IDEATIONS: ICD-10-CM

## 2017-05-03 PROBLEM — T43.621A OVERDOSE BY AMPHETAMINE (HCC): Status: ACTIVE | Noted: 2017-05-03

## 2017-05-03 PROBLEM — T50.901A DRUG OVERDOSE: Status: ACTIVE | Noted: 2017-05-03

## 2017-05-03 LAB
ALBUMIN SERPL-MCNC: 4.2 G/DL (ref 3.4–4.8)
ALBUMIN/GLOB SERPL: 1.1 G/DL (ref 1.1–1.8)
ALP SERPL-CCNC: 124 U/L (ref 38–126)
ALT SERPL W P-5'-P-CCNC: 75 U/L (ref 9–52)
AMPHET+METHAMPHET UR QL: POSITIVE
ANION GAP SERPL CALCULATED.3IONS-SCNC: 14 MMOL/L (ref 5–15)
APAP SERPL-MCNC: <10 MCG/ML (ref 10–30)
ARTERIAL PATENCY WRIST A: ABNORMAL
ARTERIAL PATENCY WRIST A: ABNORMAL
AST SERPL-CCNC: 79 U/L (ref 14–36)
ATMOSPHERIC PRESS: ABNORMAL MMHG
ATMOSPHERIC PRESS: ABNORMAL MMHG
B-HCG UR QL: NEGATIVE
BACTERIA UR QL AUTO: ABNORMAL /HPF
BARBITURATES UR QL SCN: NEGATIVE
BASE EXCESS BLDA CALC-SCNC: -5.4 MMOL/L (ref -2.4–2.4)
BASE EXCESS BLDA CALC-SCNC: -5.9 MMOL/L (ref -2.4–2.4)
BASOPHILS # BLD AUTO: 0.05 10*3/MM3 (ref 0–0.2)
BASOPHILS NFR BLD AUTO: 0.3 % (ref 0–2)
BDY SITE: ABNORMAL
BDY SITE: ABNORMAL
BENZODIAZ UR QL SCN: NEGATIVE
BILIRUB SERPL-MCNC: 1.4 MG/DL (ref 0.2–1.3)
BILIRUB UR QL STRIP: ABNORMAL
BUN BLD-MCNC: 19 MG/DL (ref 7–21)
BUN/CREAT SERPL: 20.7 (ref 7–25)
CA-I BLD-MCNC: 4.7 MG/DL (ref 4.5–4.9)
CA-I BLD-MCNC: 4.8 MG/DL (ref 4.5–4.9)
CALCIUM SPEC-SCNC: 9.4 MG/DL (ref 8.4–10.2)
CANNABINOIDS SERPL QL: NEGATIVE
CHLORIDE SERPL-SCNC: 105 MMOL/L (ref 95–110)
CK MB SERPL-CCNC: 8.36 NG/ML (ref 0–5)
CK SERPL-CCNC: 560 U/L (ref 30–135)
CLARITY UR: CLEAR
CO2 BLDA-SCNC: 19.5 MMOL/L (ref 23–27)
CO2 BLDA-SCNC: 22.1 MMOL/L (ref 23–27)
CO2 SERPL-SCNC: 21 MMOL/L (ref 22–31)
COCAINE UR QL: NEGATIVE
COLOR UR: ABNORMAL
CREAT BLD-MCNC: 0.92 MG/DL (ref 0.5–1)
DEPRECATED RDW RBC AUTO: 46.7 FL (ref 36.4–46.3)
EOSINOPHIL # BLD AUTO: 0.07 10*3/MM3 (ref 0–0.7)
EOSINOPHIL NFR BLD AUTO: 0.4 % (ref 0–7)
ERYTHROCYTE [DISTWIDTH] IN BLOOD BY AUTOMATED COUNT: 14.2 % (ref 11.5–14.5)
ETHANOL BLD-MCNC: <10 MG/DL (ref 0–10)
ETHANOL UR QL: <0.01 %
GFR SERPL CREATININE-BSD FRML MDRD: 66 ML/MIN/1.73 (ref 58–135)
GLOBULIN UR ELPH-MCNC: 3.7 GM/DL (ref 2.3–3.5)
GLUCOSE BLD-MCNC: 90 MG/DL (ref 60–100)
GLUCOSE BLDA-MCNC: 77 MMOL/L
GLUCOSE BLDA-MCNC: 83 MMOL/L
GLUCOSE BLDC GLUCOMTR-MCNC: 80 MG/DL (ref 70–130)
GLUCOSE BLDC GLUCOMTR-MCNC: 87 MG/DL (ref 70–130)
GLUCOSE BLDC GLUCOMTR-MCNC: 88 MG/DL (ref 70–130)
GLUCOSE UR STRIP-MCNC: NEGATIVE MG/DL
HCO3 BLDA-SCNC: 18.5 MMOL/L (ref 22–26)
HCO3 BLDA-SCNC: 20.8 MMOL/L (ref 22–26)
HCT VFR BLD AUTO: 37.1 % (ref 35–45)
HCT VFR BLD CALC: 38 % (ref 38–47)
HCT VFR BLD CALC: 39 % (ref 38–47)
HGB BLD-MCNC: 12.5 G/DL (ref 12–15.5)
HGB BLDA-MCNC: 12.9 G/DL (ref 12–16)
HGB BLDA-MCNC: 13.4 G/DL (ref 12–16)
HGB UR QL STRIP.AUTO: NEGATIVE
HOLD SPECIMEN: NORMAL
HOLD SPECIMEN: NORMAL
HYALINE CASTS UR QL AUTO: ABNORMAL /LPF
IMM GRANULOCYTES # BLD: 0.07 10*3/MM3 (ref 0–0.02)
IMM GRANULOCYTES NFR BLD: 0.4 % (ref 0–0.5)
KETONES UR QL STRIP: ABNORMAL
LEUKOCYTE ESTERASE UR QL STRIP.AUTO: NEGATIVE
LYMPHOCYTES # BLD AUTO: 1.78 10*3/MM3 (ref 0.6–4.2)
LYMPHOCYTES NFR BLD AUTO: 10.3 % (ref 10–50)
MCH RBC QN AUTO: 30.5 PG (ref 26.5–34)
MCHC RBC AUTO-ENTMCNC: 33.7 G/DL (ref 31.4–36)
MCV RBC AUTO: 90.5 FL (ref 80–98)
METHADONE UR QL SCN: NEGATIVE
MODALITY: ABNORMAL
MODALITY: ABNORMAL
MONOCYTES # BLD AUTO: 1.54 10*3/MM3 (ref 0–0.9)
MONOCYTES NFR BLD AUTO: 8.9 % (ref 0–12)
NEUTROPHILS # BLD AUTO: 13.83 10*3/MM3 (ref 2–8.6)
NEUTROPHILS NFR BLD AUTO: 79.7 % (ref 37–80)
NITRITE UR QL STRIP: NEGATIVE
OPIATES UR QL: NEGATIVE
OXYCODONE UR QL SCN: NEGATIVE
PCO2 BLDA: 33 MM HG (ref 35–45)
PCO2 BLDA: 43 MM HG (ref 35–45)
PH BLDA: 7.3 PH UNITS (ref 7.35–7.45)
PH BLDA: 7.37 PH UNITS (ref 7.35–7.45)
PH UR STRIP.AUTO: 5.5 [PH] (ref 5–9)
PLATELET # BLD AUTO: 256 10*3/MM3 (ref 150–450)
PMV BLD AUTO: 9.8 FL (ref 8–12)
PO2 BLDA: 49.4 MM HG (ref 80–105)
PO2 BLDA: 62.7 MM HG (ref 80–105)
POTASSIUM BLD-SCNC: 3.8 MMOL/L (ref 3.5–5.1)
POTASSIUM BLDA-SCNC: 3.69 MMOL/L (ref 3.6–4.9)
POTASSIUM BLDA-SCNC: 3.84 MMOL/L (ref 3.6–4.9)
PROT SERPL-MCNC: 7.9 G/DL (ref 6.3–8.6)
PROT UR QL STRIP: ABNORMAL
RBC # BLD AUTO: 4.1 10*6/MM3 (ref 3.77–5.16)
RBC # UR: ABNORMAL /HPF
REF LAB TEST METHOD: ABNORMAL
SALICYLATES SERPL-MCNC: <1 MG/DL (ref 10–20)
SAO2 % BLDCOA: 82.5 %
SAO2 % BLDCOA: 91.2 %
SODIUM BLD-SCNC: 140 MMOL/L (ref 137–145)
SODIUM BLDA-SCNC: 143.3 MMOL/L (ref 138–146)
SODIUM BLDA-SCNC: 145 MMOL/L (ref 138–146)
SP GR UR STRIP: 1.03 (ref 1–1.03)
SQUAMOUS #/AREA URNS HPF: ABNORMAL /HPF
TROPONIN I SERPL-MCNC: <0.012 NG/ML
UROBILINOGEN UR QL STRIP: ABNORMAL
WBC NRBC COR # BLD: 17.34 10*3/MM3 (ref 3.2–9.8)
WBC UR QL AUTO: ABNORMAL /HPF
WHOLE BLOOD HOLD SPECIMEN: NORMAL
WHOLE BLOOD HOLD SPECIMEN: NORMAL

## 2017-05-03 PROCEDURE — 99285 EMERGENCY DEPT VISIT HI MDM: CPT

## 2017-05-03 PROCEDURE — 80307 DRUG TEST PRSMV CHEM ANLYZR: CPT | Performed by: EMERGENCY MEDICINE

## 2017-05-03 PROCEDURE — 81001 URINALYSIS AUTO W/SCOPE: CPT | Performed by: EMERGENCY MEDICINE

## 2017-05-03 PROCEDURE — 93010 ELECTROCARDIOGRAM REPORT: CPT | Performed by: INTERNAL MEDICINE

## 2017-05-03 PROCEDURE — 82550 ASSAY OF CK (CPK): CPT | Performed by: EMERGENCY MEDICINE

## 2017-05-03 PROCEDURE — 93005 ELECTROCARDIOGRAM TRACING: CPT | Performed by: EMERGENCY MEDICINE

## 2017-05-03 PROCEDURE — 80053 COMPREHEN METABOLIC PANEL: CPT | Performed by: EMERGENCY MEDICINE

## 2017-05-03 PROCEDURE — 82803 BLOOD GASES ANY COMBINATION: CPT | Performed by: EMERGENCY MEDICINE

## 2017-05-03 PROCEDURE — 25010000002 DIAZEPAM PER 5 MG: Performed by: EMERGENCY MEDICINE

## 2017-05-03 PROCEDURE — 82962 GLUCOSE BLOOD TEST: CPT

## 2017-05-03 PROCEDURE — 81025 URINE PREGNANCY TEST: CPT | Performed by: EMERGENCY MEDICINE

## 2017-05-03 PROCEDURE — 82553 CREATINE MB FRACTION: CPT | Performed by: EMERGENCY MEDICINE

## 2017-05-03 PROCEDURE — 84484 ASSAY OF TROPONIN QUANT: CPT | Performed by: EMERGENCY MEDICINE

## 2017-05-03 PROCEDURE — 25010000002 DIAZEPAM PER 5 MG: Performed by: INTERNAL MEDICINE

## 2017-05-03 PROCEDURE — 85025 COMPLETE CBC W/AUTO DIFF WBC: CPT | Performed by: EMERGENCY MEDICINE

## 2017-05-03 RX ORDER — SODIUM CHLORIDE 0.9 % (FLUSH) 0.9 %
10 SYRINGE (ML) INJECTION AS NEEDED
Status: DISCONTINUED | OUTPATIENT
Start: 2017-05-03 | End: 2017-05-04 | Stop reason: HOSPADM

## 2017-05-03 RX ORDER — DIAZEPAM 5 MG/ML
5 INJECTION, SOLUTION INTRAMUSCULAR; INTRAVENOUS ONCE
Status: COMPLETED | OUTPATIENT
Start: 2017-05-03 | End: 2017-05-03

## 2017-05-03 RX ORDER — ONDANSETRON 4 MG/1
4 TABLET, FILM COATED ORAL EVERY 6 HOURS PRN
Status: DISCONTINUED | OUTPATIENT
Start: 2017-05-03 | End: 2017-05-04 | Stop reason: HOSPADM

## 2017-05-03 RX ORDER — ALUMINA, MAGNESIA, AND SIMETHICONE 2400; 2400; 240 MG/30ML; MG/30ML; MG/30ML
15 SUSPENSION ORAL EVERY 6 HOURS PRN
Status: DISCONTINUED | OUTPATIENT
Start: 2017-05-03 | End: 2017-05-04 | Stop reason: HOSPADM

## 2017-05-03 RX ORDER — DIAZEPAM 5 MG/ML
5 INJECTION, SOLUTION INTRAMUSCULAR; INTRAVENOUS
Status: DISCONTINUED | OUTPATIENT
Start: 2017-05-03 | End: 2017-05-03

## 2017-05-03 RX ORDER — SODIUM CHLORIDE 9 MG/ML
125 INJECTION, SOLUTION INTRAVENOUS CONTINUOUS
Status: DISCONTINUED | OUTPATIENT
Start: 2017-05-03 | End: 2017-05-04 | Stop reason: HOSPADM

## 2017-05-03 RX ORDER — DIAZEPAM 5 MG/ML
2.5 INJECTION, SOLUTION INTRAMUSCULAR; INTRAVENOUS
Status: DISCONTINUED | OUTPATIENT
Start: 2017-05-03 | End: 2017-05-04 | Stop reason: HOSPADM

## 2017-05-03 RX ORDER — NICOTINE 21 MG/24HR
1 PATCH, TRANSDERMAL 24 HOURS TRANSDERMAL EVERY 24 HOURS
Status: DISCONTINUED | OUTPATIENT
Start: 2017-05-03 | End: 2017-05-04 | Stop reason: HOSPADM

## 2017-05-03 RX ORDER — ACETAMINOPHEN 325 MG/1
650 TABLET ORAL EVERY 4 HOURS PRN
Status: DISCONTINUED | OUTPATIENT
Start: 2017-05-03 | End: 2017-05-04 | Stop reason: HOSPADM

## 2017-05-03 RX ORDER — SODIUM CHLORIDE 0.9 % (FLUSH) 0.9 %
1-10 SYRINGE (ML) INJECTION AS NEEDED
Status: DISCONTINUED | OUTPATIENT
Start: 2017-05-03 | End: 2017-05-04 | Stop reason: HOSPADM

## 2017-05-03 RX ADMIN — DIAZEPAM 5 MG: 5 INJECTION, SOLUTION INTRAMUSCULAR; INTRAVENOUS at 17:48

## 2017-05-03 RX ADMIN — DIAZEPAM 2.5 MG: 5 INJECTION, SOLUTION INTRAMUSCULAR; INTRAVENOUS at 20:59

## 2017-05-03 RX ADMIN — SODIUM CHLORIDE 125 ML/HR: 9 INJECTION, SOLUTION INTRAVENOUS at 22:58

## 2017-05-03 RX ADMIN — SODIUM CHLORIDE 1000 ML: 9 INJECTION, SOLUTION INTRAVENOUS at 17:09

## 2017-05-03 RX ADMIN — Medication 10 ML: at 20:00

## 2017-05-03 RX ADMIN — DIAZEPAM 2.5 MG: 5 INJECTION, SOLUTION INTRAMUSCULAR; INTRAVENOUS at 19:58

## 2017-05-03 RX ADMIN — DIAZEPAM 2.5 MG: 5 INJECTION, SOLUTION INTRAMUSCULAR; INTRAVENOUS at 22:58

## 2017-05-03 RX ADMIN — DIAZEPAM 2.5 MG: 5 INJECTION, SOLUTION INTRAMUSCULAR; INTRAVENOUS at 22:02

## 2017-05-03 RX ADMIN — DIAZEPAM 5 MG: 5 INJECTION, SOLUTION INTRAMUSCULAR; INTRAVENOUS at 18:35

## 2017-05-03 RX ADMIN — DIAZEPAM 5 MG: 5 INJECTION, SOLUTION INTRAMUSCULAR; INTRAVENOUS at 17:09

## 2017-05-03 RX ADMIN — DIAZEPAM 5 MG: 5 INJECTION, SOLUTION INTRAMUSCULAR; INTRAVENOUS at 17:22

## 2017-05-04 ENCOUNTER — HOSPITAL ENCOUNTER (INPATIENT)
Facility: HOSPITAL | Age: 47
LOS: 5 days | Discharge: HOME | End: 2017-05-09
Attending: PSYCHIATRY & NEUROLOGY | Admitting: PSYCHIATRY & NEUROLOGY

## 2017-05-04 VITALS
RESPIRATION RATE: 20 BRPM | WEIGHT: 226.4 LBS | SYSTOLIC BLOOD PRESSURE: 104 MMHG | HEIGHT: 66 IN | OXYGEN SATURATION: 92 % | DIASTOLIC BLOOD PRESSURE: 74 MMHG | TEMPERATURE: 98 F | BODY MASS INDEX: 36.38 KG/M2 | HEART RATE: 102 BPM

## 2017-05-04 PROBLEM — F33.2 SEVERE EPISODE OF RECURRENT MAJOR DEPRESSIVE DISORDER, WITHOUT PSYCHOTIC FEATURES (HCC): Status: RESOLVED | Noted: 2017-04-13 | Resolved: 2017-05-04

## 2017-05-04 PROBLEM — F33.2 MAJOR DEPRESSIVE DISORDER, RECURRENT EPISODE, SEVERE (HCC): Status: ACTIVE | Noted: 2017-05-04

## 2017-05-04 LAB
ANION GAP SERPL CALCULATED.3IONS-SCNC: 12 MMOL/L (ref 5–15)
BASOPHILS # BLD AUTO: 0.02 10*3/MM3 (ref 0–0.2)
BASOPHILS NFR BLD AUTO: 0.2 % (ref 0–2)
BUN BLD-MCNC: 13 MG/DL (ref 7–21)
BUN/CREAT SERPL: 19.1 (ref 7–25)
CALCIUM SPEC-SCNC: 8.5 MG/DL (ref 8.4–10.2)
CHLORIDE SERPL-SCNC: 108 MMOL/L (ref 95–110)
CO2 SERPL-SCNC: 22 MMOL/L (ref 22–31)
CREAT BLD-MCNC: 0.68 MG/DL (ref 0.5–1)
DEPRECATED RDW RBC AUTO: 49 FL (ref 36.4–46.3)
EOSINOPHIL # BLD AUTO: 0.41 10*3/MM3 (ref 0–0.7)
EOSINOPHIL NFR BLD AUTO: 3.2 % (ref 0–7)
ERYTHROCYTE [DISTWIDTH] IN BLOOD BY AUTOMATED COUNT: 14.5 % (ref 11.5–14.5)
GFR SERPL CREATININE-BSD FRML MDRD: 94 ML/MIN/1.73 (ref 58–135)
GLUCOSE BLD-MCNC: 73 MG/DL (ref 60–100)
GLUCOSE BLDC GLUCOMTR-MCNC: 73 MG/DL (ref 70–130)
GLUCOSE BLDC GLUCOMTR-MCNC: 80 MG/DL (ref 70–130)
GLUCOSE BLDC GLUCOMTR-MCNC: 83 MG/DL (ref 70–130)
HCT VFR BLD AUTO: 37.5 % (ref 35–45)
HGB BLD-MCNC: 12.2 G/DL (ref 12–15.5)
HOLD SPECIMEN: NORMAL
IMM GRANULOCYTES # BLD: 0.05 10*3/MM3 (ref 0–0.02)
IMM GRANULOCYTES NFR BLD: 0.4 % (ref 0–0.5)
LYMPHOCYTES # BLD AUTO: 2.49 10*3/MM3 (ref 0.6–4.2)
LYMPHOCYTES NFR BLD AUTO: 19.5 % (ref 10–50)
MCH RBC QN AUTO: 30.1 PG (ref 26.5–34)
MCHC RBC AUTO-ENTMCNC: 32.5 G/DL (ref 31.4–36)
MCV RBC AUTO: 92.6 FL (ref 80–98)
MONOCYTES # BLD AUTO: 0.99 10*3/MM3 (ref 0–0.9)
MONOCYTES NFR BLD AUTO: 7.8 % (ref 0–12)
NEUTROPHILS # BLD AUTO: 8.78 10*3/MM3 (ref 2–8.6)
NEUTROPHILS NFR BLD AUTO: 68.9 % (ref 37–80)
PLATELET # BLD AUTO: 205 10*3/MM3 (ref 150–450)
PMV BLD AUTO: 9.9 FL (ref 8–12)
POTASSIUM BLD-SCNC: 3.4 MMOL/L (ref 3.5–5.1)
RBC # BLD AUTO: 4.05 10*6/MM3 (ref 3.77–5.16)
SODIUM BLD-SCNC: 142 MMOL/L (ref 137–145)
WBC NRBC COR # BLD: 12.74 10*3/MM3 (ref 3.2–9.8)

## 2017-05-04 PROCEDURE — 90791 PSYCH DIAGNOSTIC EVALUATION: CPT | Performed by: NURSE PRACTITIONER

## 2017-05-04 PROCEDURE — 25010000002 DIAZEPAM PER 5 MG: Performed by: INTERNAL MEDICINE

## 2017-05-04 PROCEDURE — 82962 GLUCOSE BLOOD TEST: CPT

## 2017-05-04 PROCEDURE — 85025 COMPLETE CBC W/AUTO DIFF WBC: CPT | Performed by: INTERNAL MEDICINE

## 2017-05-04 PROCEDURE — 80048 BASIC METABOLIC PNL TOTAL CA: CPT | Performed by: INTERNAL MEDICINE

## 2017-05-04 RX ORDER — ONDANSETRON 4 MG/1
4 TABLET, ORALLY DISINTEGRATING ORAL EVERY 6 HOURS PRN
Status: DISCONTINUED | OUTPATIENT
Start: 2017-05-04 | End: 2017-05-09 | Stop reason: HOSPADM

## 2017-05-04 RX ORDER — ACETAMINOPHEN 325 MG/1
650 TABLET ORAL EVERY 4 HOURS PRN
Status: DISCONTINUED | OUTPATIENT
Start: 2017-05-04 | End: 2017-05-09 | Stop reason: HOSPADM

## 2017-05-04 RX ORDER — TRAZODONE HYDROCHLORIDE 50 MG/1
50 TABLET ORAL NIGHTLY PRN
Status: DISCONTINUED | OUTPATIENT
Start: 2017-05-04 | End: 2017-05-04

## 2017-05-04 RX ORDER — GABAPENTIN 400 MG/1
800 CAPSULE ORAL EVERY 8 HOURS SCHEDULED
Status: DISCONTINUED | OUTPATIENT
Start: 2017-05-04 | End: 2017-05-09 | Stop reason: HOSPADM

## 2017-05-04 RX ORDER — MAGNESIUM HYDROXIDE/ALUMINUM HYDROXICE/SIMETHICONE 120; 1200; 1200 MG/30ML; MG/30ML; MG/30ML
30 SUSPENSION ORAL EVERY 6 HOURS PRN
Status: DISCONTINUED | OUTPATIENT
Start: 2017-05-04 | End: 2017-05-09 | Stop reason: HOSPADM

## 2017-05-04 RX ORDER — MIRTAZAPINE 15 MG/1
30 TABLET, FILM COATED ORAL NIGHTLY
Status: DISCONTINUED | OUTPATIENT
Start: 2017-05-04 | End: 2017-05-09 | Stop reason: HOSPADM

## 2017-05-04 RX ORDER — LOPERAMIDE HYDROCHLORIDE 2 MG/1
2 CAPSULE ORAL 4 TIMES DAILY PRN
Status: DISCONTINUED | OUTPATIENT
Start: 2017-05-04 | End: 2017-05-09 | Stop reason: HOSPADM

## 2017-05-04 RX ORDER — PAROXETINE HYDROCHLORIDE 20 MG/1
60 TABLET, FILM COATED ORAL DAILY
Status: DISCONTINUED | OUTPATIENT
Start: 2017-05-04 | End: 2017-05-09 | Stop reason: HOSPADM

## 2017-05-04 RX ORDER — NICOTINE 21 MG/24HR
1 PATCH, TRANSDERMAL 24 HOURS TRANSDERMAL EVERY 24 HOURS
Status: DISCONTINUED | OUTPATIENT
Start: 2017-05-04 | End: 2017-05-09 | Stop reason: HOSPADM

## 2017-05-04 RX ORDER — POTASSIUM CHLORIDE 750 MG/1
40 CAPSULE, EXTENDED RELEASE ORAL ONCE
Status: COMPLETED | OUTPATIENT
Start: 2017-05-04 | End: 2017-05-04

## 2017-05-04 RX ORDER — LAMOTRIGINE 25 MG/1
25 TABLET ORAL DAILY
Status: DISCONTINUED | OUTPATIENT
Start: 2017-05-04 | End: 2017-05-04

## 2017-05-04 RX ORDER — HYDROXYZINE PAMOATE 50 MG/1
50 CAPSULE ORAL EVERY 6 HOURS PRN
Status: DISCONTINUED | OUTPATIENT
Start: 2017-05-04 | End: 2017-05-09 | Stop reason: HOSPADM

## 2017-05-04 RX ORDER — LAMOTRIGINE 25 MG/1
50 TABLET ORAL DAILY
Status: DISCONTINUED | OUTPATIENT
Start: 2017-05-05 | End: 2017-05-06

## 2017-05-04 RX ADMIN — GABAPENTIN 800 MG: 400 CAPSULE ORAL at 21:13

## 2017-05-04 RX ADMIN — LOPERAMIDE HYDROCHLORIDE 2 MG: 2 CAPSULE ORAL at 16:58

## 2017-05-04 RX ADMIN — DIAZEPAM 2.5 MG: 5 INJECTION, SOLUTION INTRAMUSCULAR; INTRAVENOUS at 02:46

## 2017-05-04 RX ADMIN — DIAZEPAM 2.5 MG: 5 INJECTION, SOLUTION INTRAMUSCULAR; INTRAVENOUS at 06:40

## 2017-05-04 RX ADMIN — GABAPENTIN 800 MG: 400 CAPSULE ORAL at 14:11

## 2017-05-04 RX ADMIN — POTASSIUM CHLORIDE 40 MEQ: 750 CAPSULE, EXTENDED RELEASE ORAL at 11:33

## 2017-05-04 RX ADMIN — SODIUM CHLORIDE 125 ML/HR: 9 INJECTION, SOLUTION INTRAVENOUS at 06:59

## 2017-05-04 RX ADMIN — MIRTAZAPINE 30 MG: 15 TABLET, FILM COATED ORAL at 21:14

## 2017-05-04 RX ADMIN — LAMOTRIGINE 25 MG: 25 TABLET ORAL at 14:11

## 2017-05-04 RX ADMIN — PAROXETINE HYDROCHLORIDE 60 MG: 20 TABLET, FILM COATED ORAL at 14:11

## 2017-05-05 PROCEDURE — 99222 1ST HOSP IP/OBS MODERATE 55: CPT | Performed by: FAMILY MEDICINE

## 2017-05-05 PROCEDURE — 71020 HC CHEST PA AND LATERAL: CPT

## 2017-05-05 PROCEDURE — 99232 SBSQ HOSP IP/OBS MODERATE 35: CPT | Performed by: NURSE PRACTITIONER

## 2017-05-05 RX ORDER — ZIPRASIDONE HYDROCHLORIDE 40 MG/1
40 CAPSULE ORAL
Status: DISCONTINUED | OUTPATIENT
Start: 2017-05-05 | End: 2017-05-06

## 2017-05-05 RX ORDER — DEXTROMETHORPHAN POLISTIREX 30 MG/5ML
30 SUSPENSION ORAL 2 TIMES DAILY
Status: DISCONTINUED | OUTPATIENT
Start: 2017-05-05 | End: 2017-05-09 | Stop reason: HOSPADM

## 2017-05-05 RX ADMIN — GABAPENTIN 800 MG: 400 CAPSULE ORAL at 05:50

## 2017-05-05 RX ADMIN — PAROXETINE HYDROCHLORIDE 60 MG: 20 TABLET, FILM COATED ORAL at 08:39

## 2017-05-05 RX ADMIN — DEXTROMETHORPHAN 30 MG: 30 SUSPENSION, EXTENDED RELEASE ORAL at 18:00

## 2017-05-05 RX ADMIN — DEXTROMETHORPHAN 30 MG: 30 SUSPENSION, EXTENDED RELEASE ORAL at 11:06

## 2017-05-05 RX ADMIN — LOPERAMIDE HYDROCHLORIDE 2 MG: 2 CAPSULE ORAL at 08:39

## 2017-05-05 RX ADMIN — GABAPENTIN 800 MG: 400 CAPSULE ORAL at 14:56

## 2017-05-05 RX ADMIN — GABAPENTIN 800 MG: 400 CAPSULE ORAL at 21:01

## 2017-05-05 RX ADMIN — LAMOTRIGINE 50 MG: 25 TABLET ORAL at 08:30

## 2017-05-05 RX ADMIN — LOPERAMIDE HYDROCHLORIDE 2 MG: 2 CAPSULE ORAL at 17:51

## 2017-05-05 RX ADMIN — ZIPRASIDONE HCL 40 MG: 40 CAPSULE ORAL at 17:48

## 2017-05-05 RX ADMIN — MIRTAZAPINE 30 MG: 15 TABLET, FILM COATED ORAL at 20:43

## 2017-05-06 PROBLEM — T50.901A DRUG OVERDOSE: Status: RESOLVED | Noted: 2017-05-03 | Resolved: 2017-05-06

## 2017-05-06 PROBLEM — F15.959: Status: ACTIVE | Noted: 2017-05-06

## 2017-05-06 PROBLEM — T43.621A OVERDOSE BY AMPHETAMINE (HCC): Status: RESOLVED | Noted: 2017-05-03 | Resolved: 2017-05-06

## 2017-05-06 PROCEDURE — 99232 SBSQ HOSP IP/OBS MODERATE 35: CPT | Performed by: PSYCHIATRY & NEUROLOGY

## 2017-05-06 RX ORDER — LAMOTRIGINE 100 MG/1
100 TABLET ORAL DAILY
Status: DISCONTINUED | OUTPATIENT
Start: 2017-05-07 | End: 2017-05-09 | Stop reason: HOSPADM

## 2017-05-06 RX ADMIN — ZIPRASIDONE HCL 60 MG: 20 CAPSULE ORAL at 17:29

## 2017-05-06 RX ADMIN — MIRTAZAPINE 30 MG: 15 TABLET, FILM COATED ORAL at 20:35

## 2017-05-06 RX ADMIN — GABAPENTIN 800 MG: 400 CAPSULE ORAL at 14:00

## 2017-05-06 RX ADMIN — LAMOTRIGINE 50 MG: 25 TABLET ORAL at 08:04

## 2017-05-06 RX ADMIN — DEXTROMETHORPHAN 30 MG: 30 SUSPENSION, EXTENDED RELEASE ORAL at 19:37

## 2017-05-06 RX ADMIN — PAROXETINE HYDROCHLORIDE 60 MG: 20 TABLET, FILM COATED ORAL at 08:04

## 2017-05-06 RX ADMIN — GABAPENTIN 800 MG: 400 CAPSULE ORAL at 05:40

## 2017-05-06 RX ADMIN — DEXTROMETHORPHAN 30 MG: 30 SUSPENSION, EXTENDED RELEASE ORAL at 08:04

## 2017-05-06 RX ADMIN — GABAPENTIN 800 MG: 400 CAPSULE ORAL at 22:30

## 2017-05-06 RX ADMIN — LOPERAMIDE HYDROCHLORIDE 2 MG: 2 CAPSULE ORAL at 08:05

## 2017-05-07 PROCEDURE — 99232 SBSQ HOSP IP/OBS MODERATE 35: CPT | Performed by: PSYCHIATRY & NEUROLOGY

## 2017-05-07 RX ORDER — GUAIFENESIN/DEXTROMETHORPHAN 100-10MG/5
5 SYRUP ORAL EVERY 24 HOURS
Status: DISCONTINUED | OUTPATIENT
Start: 2017-05-07 | End: 2017-05-09 | Stop reason: HOSPADM

## 2017-05-07 RX ADMIN — GUAIFENESIN AND DEXTROMETHORPHAN 5 ML: 100; 10 SYRUP ORAL at 21:20

## 2017-05-07 RX ADMIN — ZIPRASIDONE HCL 60 MG: 20 CAPSULE ORAL at 18:00

## 2017-05-07 RX ADMIN — DEXTROMETHORPHAN 30 MG: 30 SUSPENSION, EXTENDED RELEASE ORAL at 08:11

## 2017-05-07 RX ADMIN — DEXTROMETHORPHAN 30 MG: 30 SUSPENSION, EXTENDED RELEASE ORAL at 19:44

## 2017-05-07 RX ADMIN — MIRTAZAPINE 30 MG: 15 TABLET, FILM COATED ORAL at 21:17

## 2017-05-07 RX ADMIN — GABAPENTIN 800 MG: 400 CAPSULE ORAL at 13:54

## 2017-05-07 RX ADMIN — GABAPENTIN 800 MG: 400 CAPSULE ORAL at 06:29

## 2017-05-07 RX ADMIN — PAROXETINE HYDROCHLORIDE 60 MG: 20 TABLET, FILM COATED ORAL at 08:11

## 2017-05-07 RX ADMIN — LAMOTRIGINE 100 MG: 100 TABLET ORAL at 08:11

## 2017-05-07 RX ADMIN — GABAPENTIN 800 MG: 400 CAPSULE ORAL at 21:17

## 2017-05-08 PROCEDURE — 99232 SBSQ HOSP IP/OBS MODERATE 35: CPT | Performed by: PSYCHIATRY & NEUROLOGY

## 2017-05-08 RX ORDER — BISACODYL 10 MG
10 SUPPOSITORY, RECTAL RECTAL DAILY PRN
Status: DISCONTINUED | OUTPATIENT
Start: 2017-05-08 | End: 2017-05-09 | Stop reason: HOSPADM

## 2017-05-08 RX ADMIN — MIRTAZAPINE 30 MG: 15 TABLET, FILM COATED ORAL at 20:17

## 2017-05-08 RX ADMIN — GABAPENTIN 800 MG: 400 CAPSULE ORAL at 15:10

## 2017-05-08 RX ADMIN — GABAPENTIN 800 MG: 400 CAPSULE ORAL at 05:58

## 2017-05-08 RX ADMIN — GABAPENTIN 800 MG: 400 CAPSULE ORAL at 23:41

## 2017-05-08 RX ADMIN — NICOTINE 1 PATCH: 21 PATCH TRANSDERMAL at 11:49

## 2017-05-08 RX ADMIN — ZIPRASIDONE HCL 60 MG: 20 CAPSULE ORAL at 17:39

## 2017-05-08 RX ADMIN — LAMOTRIGINE 100 MG: 100 TABLET ORAL at 08:06

## 2017-05-08 RX ADMIN — GUAIFENESIN AND DEXTROMETHORPHAN 5 ML: 100; 10 SYRUP ORAL at 20:17

## 2017-05-08 RX ADMIN — PAROXETINE HYDROCHLORIDE 60 MG: 20 TABLET, FILM COATED ORAL at 08:06

## 2017-05-09 VITALS
BODY MASS INDEX: 39.14 KG/M2 | OXYGEN SATURATION: 97 % | WEIGHT: 212.7 LBS | RESPIRATION RATE: 20 BRPM | HEIGHT: 62 IN | DIASTOLIC BLOOD PRESSURE: 89 MMHG | TEMPERATURE: 96.9 F | HEART RATE: 96 BPM | SYSTOLIC BLOOD PRESSURE: 150 MMHG

## 2017-05-09 PROCEDURE — 99238 HOSP IP/OBS DSCHRG MGMT 30/<: CPT | Performed by: PSYCHIATRY & NEUROLOGY

## 2017-05-09 RX ORDER — LAMOTRIGINE 100 MG/1
100 TABLET ORAL DAILY
Qty: 30 TABLET | Refills: 0 | Status: SHIPPED | OUTPATIENT
Start: 2017-05-09

## 2017-05-09 RX ORDER — ZIPRASIDONE HYDROCHLORIDE 60 MG/1
60 CAPSULE ORAL
Qty: 30 CAPSULE | Refills: 0 | Status: SHIPPED | OUTPATIENT
Start: 2017-05-09

## 2017-05-09 RX ORDER — GABAPENTIN 600 MG/1
900 TABLET ORAL 3 TIMES DAILY
Qty: 60 TABLET | Refills: 0 | Status: SHIPPED | OUTPATIENT
Start: 2017-05-09

## 2017-05-09 RX ORDER — MIRTAZAPINE 30 MG/1
30 TABLET, FILM COATED ORAL NIGHTLY
Qty: 30 TABLET | Refills: 0 | Status: SHIPPED | OUTPATIENT
Start: 2017-05-09

## 2017-05-09 RX ORDER — PAROXETINE 30 MG/1
60 TABLET, FILM COATED ORAL DAILY
Qty: 60 TABLET | Refills: 0 | Status: SHIPPED | OUTPATIENT
Start: 2017-05-09

## 2017-05-09 RX ORDER — GABAPENTIN 800 MG/1
800 TABLET ORAL 4 TIMES DAILY
Qty: 120 TABLET | Refills: 0 | Status: SHIPPED | OUTPATIENT
Start: 2017-05-09 | End: 2017-05-09

## 2017-05-09 RX ADMIN — LAMOTRIGINE 100 MG: 100 TABLET ORAL at 08:06

## 2017-05-09 RX ADMIN — PAROXETINE HYDROCHLORIDE 60 MG: 20 TABLET, FILM COATED ORAL at 08:06

## 2017-05-09 RX ADMIN — GABAPENTIN 800 MG: 400 CAPSULE ORAL at 06:25

## 2022-09-16 NOTE — PLAN OF CARE
Problem: [facility] Patient Care Overview (Adult)  Goal: Plan of Care Review  Outcome: Ongoing (interventions implemented as appropriate)    04/13/17 0939   Coping/Psychosocial Response Interventions   Plan Of Care Reviewed With patient   Coping/Psychosocial   Patient Agreement with Plan of Care agrees   Patient Care Overview   Progress no change       Goal: Interdisciplinary Rounds/Family Conference  Outcome: Ongoing (interventions implemented as appropriate)    04/13/17 0939   Interdisciplinary Rounds/Family Conf   Participants advanced practice nurse;nursing;patient;psychiatrist;social work;therapeutic recreation       Goal: Individualization and Mutuality  Outcome: Ongoing (interventions implemented as appropriate)  Goal: Discharge Needs Assessment  Outcome: Ongoing (interventions implemented as appropriate)       5